# Patient Record
Sex: FEMALE | Race: WHITE | NOT HISPANIC OR LATINO | Employment: FULL TIME | URBAN - METROPOLITAN AREA
[De-identification: names, ages, dates, MRNs, and addresses within clinical notes are randomized per-mention and may not be internally consistent; named-entity substitution may affect disease eponyms.]

---

## 2017-02-15 ENCOUNTER — OFFICE VISIT (OUTPATIENT)
Dept: FAMILY MEDICINE CLINIC | Facility: CLINIC | Age: 57
End: 2017-02-15

## 2017-02-15 VITALS
OXYGEN SATURATION: 97 % | HEIGHT: 63 IN | BODY MASS INDEX: 31.54 KG/M2 | DIASTOLIC BLOOD PRESSURE: 78 MMHG | TEMPERATURE: 97.9 F | SYSTOLIC BLOOD PRESSURE: 126 MMHG | RESPIRATION RATE: 16 BRPM | HEART RATE: 97 BPM | WEIGHT: 178 LBS

## 2017-02-15 DIAGNOSIS — R21 RASH: Primary | ICD-10-CM

## 2017-02-15 PROCEDURE — 99213 OFFICE O/P EST LOW 20 MIN: CPT | Performed by: FAMILY MEDICINE

## 2017-02-15 RX ORDER — FLUCONAZOLE 150 MG/1
TABLET ORAL
COMMUNITY
Start: 2017-01-18 | End: 2020-01-03

## 2017-02-15 RX ORDER — CHLORHEXIDINE GLUCONATE 0.12 MG/ML
RINSE ORAL
COMMUNITY
Start: 2017-01-24 | End: 2019-01-04

## 2017-02-15 RX ORDER — ACYCLOVIR 50 MG/G
CREAM TOPICAL
COMMUNITY
Start: 2017-02-12 | End: 2018-03-23

## 2017-02-15 RX ORDER — VALACYCLOVIR HYDROCHLORIDE 1 G/1
1000 TABLET, FILM COATED ORAL 3 TIMES DAILY
Qty: 21 TABLET | Refills: 0 | Status: SHIPPED | OUTPATIENT
Start: 2017-02-15 | End: 2017-02-22

## 2017-02-15 RX ORDER — LIFITEGRAST 50 MG/ML
SOLUTION/ DROPS OPHTHALMIC
COMMUNITY
Start: 2017-01-24

## 2017-02-15 RX ORDER — FLUOXETINE HYDROCHLORIDE 20 MG/1
CAPSULE ORAL
COMMUNITY
Start: 2017-01-31 | End: 2020-07-07

## 2017-02-15 RX ORDER — ESTRADIOL 0.75 MG/1.25G
GEL, METERED TOPICAL
COMMUNITY
Start: 2017-01-23 | End: 2018-03-23

## 2017-02-15 NOTE — PROGRESS NOTES
Subjective   Arlene Quesada is a 56 y.o. female.     History of Present Illness   Chief Complaint:   Chief Complaint   Patient presents with   • Rash     left thigh       Arlene Quesada 56 y.o. female who presents today for a rash on her upper left thigh that has been present for a week. She stated tit is not painful but does itch. She is concerned it is spreading. She has been taking Valtrex. She already takes one gram a day for Herpes Simplex 1..  she has a history of   Patient Active Problem List   Diagnosis   • Depression   • Gastroesophageal reflux   • Glaucoma   • Hyperlipemia   • Hypertension, essential, benign   • Low back pain   • Nausea   .  Since the last visit, she has overall felt well.  she has been compliant with   Current Outpatient Prescriptions:   •  amLODIPine-benazepril (LOTREL 5-10) 5-10 MG per capsule, Take 1 capsule by mouth daily. For BP, Disp: 30 capsule, Rfl: 5  •  bimatoprost (LUMIGAN) 0.01 % ophthalmic drops, 1 drop nightly. Instill 1 in affected eye once a day (at bedtime), Disp: , Rfl:   •  Calcium Carbonate-Vit D-Min (CALCIUM 1200) 1880-2561 MG-UNIT chewable tablet, Chew., Disp: , Rfl:   •  chlorhexidine (PERIDEX) 0.12 % solution, , Disp: , Rfl:   •  Cholecalciferol (VITAMIN D3) 2000 UNITS tablet, Take by mouth. Take 1 capsule by oral route daily for 90 days, Disp: , Rfl:   •  desvenlafaxine (PRISTIQ) 100 MG 24 hr tablet, Take 1 tablet by mouth daily., Disp: 30 tablet, Rfl: 5  •  esomeprazole (NEXIUM) 20 MG capsule, Take 20 mg by mouth. Take 1 capsule (20 mg) by oral route once daily at least 1 hour before a meal for 4 weeks swallowing whole.  Do not crush or chew granules., Disp: , Rfl:   •  ESTROGEL 0.75 MG/1.25 GM (0.06%) topical gel, , Disp: , Rfl:   •  fluconazole (DIFLUCAN) 150 MG tablet, , Disp: , Rfl:   •  FLUoxetine (PROzac) 20 MG capsule, , Disp: , Rfl:   •  fluticasone (FLONASE) 50 MCG/ACT nasal spray, 2 sprays into each nostril daily. Administer 2 sprays in each nostril for  "each dose., Disp: 1 each, Rfl: 11  •  valACYclovir (VALTREX) 1000 MG tablet, Take 1 tablet by mouth daily. Take 1 tablet by mouth once a day, Disp: 30 tablet, Rfl: 11  •  XIIDRA 5 % solution, , Disp: , Rfl:   •  ZOVIRAX 5 % cream, , Disp: , Rfl:   •  benzonatate (TESSALON) 200 MG capsule, Take 1 capsule by mouth 3 (Three) Times a Day As Needed for cough., Disp: 30 capsule, Rfl: 0  •  levoFLOXacin (LEVAQUIN) 500 MG tablet, Take 1 tablet by mouth Daily. One PO daily for infection, Disp: 14 tablet, Rfl: 1  •  MethylPREDNISolone (MEDROL, CHULA,) 4 MG tablet, follow package directions, Disp: 21 tablet, Rfl: 0  •  terconazole (TERAZOL 3) 0.8 % vaginal cream, Insert 1 applicator into the vagina Every Night. (put on file), Disp: 20 each, Rfl: 5.  she denies medication side effects.    All of the chronic condition(s) listed above are stable w/o issues.    Visit Vitals   • /78   • Pulse 97   • Temp 97.9 °F (36.6 °C) (Oral)   • Resp 16   • Ht 63\" (160 cm)   • Wt 178 lb (80.7 kg)   • SpO2 97%   • BMI 31.53 kg/m2       The following portions of the patient's history were reviewed and updated as appropriate: allergies, current medications, past family history, past medical history, past social history, past surgical history and problem list.    Review of Systems   Constitutional: Negative for activity change, appetite change and unexpected weight change.   Eyes: Negative for visual disturbance.   Respiratory: Negative for chest tightness and shortness of breath.    Cardiovascular: Negative for chest pain and palpitations.   Skin: Positive for rash.   Neurological: Negative for syncope and speech difficulty.   Psychiatric/Behavioral: Negative for confusion and decreased concentration.       Objective   Physical Exam   Constitutional: She appears well-developed and well-nourished.   Skin:   ? Herpes zoster type rash left anterior thigh    4 days old   Taking valtrex for this  Continue same     4 lesions  Left anterior thigh "   Nursing note and vitals reviewed.      Assessment/Plan   Arlene was seen today for rash.    Diagnoses and all orders for this visit:    Rash  Comments:  shingles  left thigh    Other orders  -     valACYclovir (VALTREX) 1000 MG tablet; Take 1 tablet by mouth 3 (Three) Times a Day for 7 days.

## 2017-02-15 NOTE — PATIENT INSTRUCTIONS
Exercise 30 minutes most days of the week  Sleep 6-8 hours each night if possible  Low fat, low cholesterol diet   we discussed prescribed medications and how to take them   make sure you get results of any labs/studies ordered today  Low glycemic index diet  Zyrtec 1 po q day

## 2017-03-02 ENCOUNTER — RESULTS ENCOUNTER (OUTPATIENT)
Dept: FAMILY MEDICINE CLINIC | Facility: CLINIC | Age: 57
End: 2017-03-02

## 2017-03-02 DIAGNOSIS — E78.5 HYPERLIPEMIA: ICD-10-CM

## 2017-03-02 DIAGNOSIS — I10 HYPERTENSION, ESSENTIAL, BENIGN: ICD-10-CM

## 2017-03-02 DIAGNOSIS — F32.A DEPRESSION: ICD-10-CM

## 2017-04-25 RX ORDER — AMLODIPINE BESYLATE AND BENAZEPRIL HYDROCHLORIDE 5; 10 MG/1; MG/1
CAPSULE ORAL
Qty: 30 CAPSULE | Refills: 0 | Status: SHIPPED | OUTPATIENT
Start: 2017-04-25 | End: 2017-05-22 | Stop reason: SDUPTHER

## 2017-05-22 RX ORDER — AMLODIPINE BESYLATE AND BENAZEPRIL HYDROCHLORIDE 5; 10 MG/1; MG/1
CAPSULE ORAL
Qty: 30 CAPSULE | Refills: 0 | Status: SHIPPED | OUTPATIENT
Start: 2017-05-22 | End: 2017-07-05 | Stop reason: SDUPTHER

## 2017-07-05 RX ORDER — AMLODIPINE BESYLATE AND BENAZEPRIL HYDROCHLORIDE 5; 10 MG/1; MG/1
CAPSULE ORAL
Qty: 30 CAPSULE | Refills: 0 | Status: SHIPPED | OUTPATIENT
Start: 2017-07-05 | End: 2017-07-11 | Stop reason: SDUPTHER

## 2017-07-06 RX ORDER — VALACYCLOVIR HYDROCHLORIDE 1 G/1
TABLET, FILM COATED ORAL
Qty: 30 TABLET | Refills: 10 | Status: SHIPPED | OUTPATIENT
Start: 2017-07-06 | End: 2017-07-11 | Stop reason: SDUPTHER

## 2017-07-11 ENCOUNTER — OFFICE VISIT (OUTPATIENT)
Dept: FAMILY MEDICINE CLINIC | Facility: CLINIC | Age: 57
End: 2017-07-11

## 2017-07-11 VITALS
WEIGHT: 177 LBS | RESPIRATION RATE: 16 BRPM | HEIGHT: 63 IN | TEMPERATURE: 98.5 F | SYSTOLIC BLOOD PRESSURE: 110 MMHG | DIASTOLIC BLOOD PRESSURE: 70 MMHG | BODY MASS INDEX: 31.36 KG/M2 | HEART RATE: 92 BPM | OXYGEN SATURATION: 97 %

## 2017-07-11 DIAGNOSIS — B00.9 RECURRENT HSV (HERPES SIMPLEX VIRUS): ICD-10-CM

## 2017-07-11 DIAGNOSIS — J30.2 SEASONAL ALLERGIC RHINITIS, UNSPECIFIED ALLERGIC RHINITIS TRIGGER: ICD-10-CM

## 2017-07-11 DIAGNOSIS — I10 HYPERTENSION, ESSENTIAL, BENIGN: Primary | ICD-10-CM

## 2017-07-11 DIAGNOSIS — E78.2 MIXED HYPERLIPIDEMIA: ICD-10-CM

## 2017-07-11 DIAGNOSIS — K21.9 GASTROESOPHAGEAL REFLUX DISEASE WITHOUT ESOPHAGITIS: ICD-10-CM

## 2017-07-11 PROCEDURE — 99213 OFFICE O/P EST LOW 20 MIN: CPT | Performed by: PHYSICIAN ASSISTANT

## 2017-07-11 RX ORDER — AMLODIPINE BESYLATE AND BENAZEPRIL HYDROCHLORIDE 5; 10 MG/1; MG/1
1 CAPSULE ORAL DAILY
Qty: 30 CAPSULE | Refills: 5 | Status: SHIPPED | OUTPATIENT
Start: 2017-07-11 | End: 2018-01-17 | Stop reason: SDUPTHER

## 2017-07-11 RX ORDER — FLUTICASONE PROPIONATE 50 MCG
2 SPRAY, SUSPENSION (ML) NASAL DAILY
Qty: 1 EACH | Refills: 11 | Status: SHIPPED | OUTPATIENT
Start: 2017-07-11

## 2017-07-11 RX ORDER — VALACYCLOVIR HYDROCHLORIDE 1 G/1
1000 TABLET, FILM COATED ORAL DAILY
Qty: 30 TABLET | Refills: 11 | Status: SHIPPED | OUTPATIENT
Start: 2017-07-11 | End: 2018-08-22 | Stop reason: SDUPTHER

## 2017-07-11 NOTE — PATIENT INSTRUCTIONS
Can try Zantac 150mg twice daily in place of Nexium to see if controls heartburn  I will put Nexium on file  See ortho

## 2017-07-11 NOTE — PROGRESS NOTES
Subjective   Arlene Quesada is a 57 y.o. female.     History of Present Illness   Arlene Quesada 57 y.o. female who presents today for routine follow up check and medication refills.  she has a history of   Patient Active Problem List   Diagnosis   • Depression   • Gastroesophageal reflux   • Glaucoma   • Hyperlipemia   • Hypertension, essential, benign   • Low back pain   • Nausea   • Recurrent HSV (herpes simplex virus)   .  Since the last visit, she has overall felt tired.  She has Hypertenision and is well controlled on medication and GERD and is well controlled on PPI medication.  she has been compliant with current medications have reviewed them.  The patient denies medication side effects.      On allergy meds and working well  I must update all labs  She is on allergy meds and helps  Seeing psych for depression    I will update all labs  Knee pain Right and will refer ortho  On Valtrex for HSV suppression    The following portions of the patient's history were reviewed and updated as appropriate: allergies, current medications, past family history, past medical history, past social history, past surgical history and problem list.    Review of Systems   Constitutional: Negative for activity change, appetite change and unexpected weight change.   HENT: Negative for nosebleeds and trouble swallowing.    Eyes: Negative for pain and visual disturbance.   Respiratory: Negative for chest tightness, shortness of breath and wheezing.    Cardiovascular: Negative for chest pain and palpitations.   Gastrointestinal: Negative for abdominal pain and blood in stool.   Endocrine: Negative.    Genitourinary: Negative for difficulty urinating and hematuria.   Musculoskeletal: Positive for arthralgias. Negative for joint swelling.   Skin: Negative for color change and rash.   Allergic/Immunologic: Negative.    Neurological: Negative for syncope and speech difficulty.   Hematological: Negative for adenopathy.    Psychiatric/Behavioral: Positive for dysphoric mood. Negative for agitation and confusion.   All other systems reviewed and are negative.      Objective   Physical Exam   Constitutional: She is oriented to person, place, and time. She appears well-developed and well-nourished. No distress.   HENT:   Head: Normocephalic and atraumatic.   Eyes: Conjunctivae and EOM are normal. Pupils are equal, round, and reactive to light. Right eye exhibits no discharge. Left eye exhibits no discharge. No scleral icterus.   Neck: Normal range of motion. Neck supple. No tracheal deviation present. No thyromegaly present.   Cardiovascular: Normal rate, regular rhythm, normal heart sounds, intact distal pulses and normal pulses.  Exam reveals no gallop.    No murmur heard.  Pulmonary/Chest: Effort normal and breath sounds normal. No respiratory distress. She has no wheezes. She has no rales.   Musculoskeletal: Normal range of motion.   Neurological: She is alert and oriented to person, place, and time. She exhibits normal muscle tone. Coordination normal.   Skin: Skin is warm. No rash noted. No erythema. No pallor.   Psychiatric: She has a normal mood and affect. Her behavior is normal. Judgment and thought content normal.   Nursing note and vitals reviewed.      Assessment/Plan   Arlene was seen today for hypertension and knee pain.    Diagnoses and all orders for this visit:    Hypertension, essential, benign  -     Comprehensive metabolic panel  -     Lipid panel  -     CBC and Differential  -     TSH  -     T4, Free  -     Vitamin D 25 Hydroxy    Gastroesophageal reflux disease without esophagitis  -     Comprehensive metabolic panel  -     Lipid panel  -     CBC and Differential  -     TSH  -     T4, Free  -     Vitamin D 25 Hydroxy    Mixed hyperlipidemia  -     Comprehensive metabolic panel  -     Lipid panel  -     CBC and Differential  -     TSH  -     T4, Free  -     Vitamin D 25 Hydroxy    Seasonal allergic rhinitis,  unspecified allergic rhinitis trigger  -     Comprehensive metabolic panel  -     Lipid panel  -     CBC and Differential  -     TSH  -     T4, Free  -     Vitamin D 25 Hydroxy    Recurrent HSV (herpes simplex virus)  -     Comprehensive metabolic panel  -     Lipid panel  -     CBC and Differential  -     TSH  -     T4, Free  -     Vitamin D 25 Hydroxy    Other orders  -     valACYclovir (VALTREX) 1000 MG tablet; Take 1 tablet by mouth Daily.  -     terconazole (TERAZOL 3) 0.8 % vaginal cream; Insert 1 applicator into the vagina Every Night. (put on file)  -     fluticasone (FLONASE) 50 MCG/ACT nasal spray; 2 sprays into each nostril Daily. Administer 2 sprays in each nostril for each dose. For allergies  -     amLODIPine-benazepril (LOTREL 5-10) 5-10 MG per capsule; Take 1 capsule by mouth Daily. For BP

## 2018-01-17 ENCOUNTER — OFFICE VISIT (OUTPATIENT)
Dept: FAMILY MEDICINE CLINIC | Facility: CLINIC | Age: 58
End: 2018-01-17

## 2018-01-17 VITALS
WEIGHT: 177 LBS | TEMPERATURE: 98.6 F | SYSTOLIC BLOOD PRESSURE: 120 MMHG | BODY MASS INDEX: 31.36 KG/M2 | DIASTOLIC BLOOD PRESSURE: 70 MMHG | HEART RATE: 88 BPM | RESPIRATION RATE: 16 BRPM | OXYGEN SATURATION: 99 % | HEIGHT: 63 IN

## 2018-01-17 DIAGNOSIS — E78.2 MIXED HYPERLIPIDEMIA: ICD-10-CM

## 2018-01-17 DIAGNOSIS — I10 HYPERTENSION, ESSENTIAL, BENIGN: ICD-10-CM

## 2018-01-17 DIAGNOSIS — J06.9 ACUTE URI: Primary | ICD-10-CM

## 2018-01-17 DIAGNOSIS — Z00.00 LABORATORY EXAMINATION ORDERED AS PART OF A ROUTINE GENERAL MEDICAL EXAMINATION: ICD-10-CM

## 2018-01-17 PROCEDURE — 99214 OFFICE O/P EST MOD 30 MIN: CPT | Performed by: PHYSICIAN ASSISTANT

## 2018-01-17 RX ORDER — AMLODIPINE BESYLATE AND BENAZEPRIL HYDROCHLORIDE 5; 10 MG/1; MG/1
1 CAPSULE ORAL DAILY
Qty: 30 CAPSULE | Refills: 5 | Status: SHIPPED | OUTPATIENT
Start: 2018-01-17 | End: 2018-10-02 | Stop reason: SDUPTHER

## 2018-01-17 RX ORDER — FLUCONAZOLE 150 MG/1
150 TABLET ORAL ONCE
Qty: 1 TABLET | Refills: 2 | Status: SHIPPED | OUTPATIENT
Start: 2018-01-17 | End: 2018-01-17

## 2018-01-17 RX ORDER — LEVOFLOXACIN 500 MG/1
500 TABLET, FILM COATED ORAL DAILY
Qty: 10 TABLET | Refills: 1 | Status: SHIPPED | OUTPATIENT
Start: 2018-01-17 | End: 2018-03-23

## 2018-01-17 NOTE — PATIENT INSTRUCTIONS
Low glycemic index diet  Exercise 30 minutes most days of the week  Make sure you get results on any labs or tests we ordered today  We discussed medications and how to take them as prescribed  Sleep 6-8 hours each night if possible  If you have not signed up for [employer], please activate your code ASAP from your After Visit Summary today    LDL goal <100  LDL goal if heart disease <70  HDL goal >60  Triglyceride goal <150  BP goal =<130/80  Fasting glucose <100    Labs  For throat pain:  Can gargle with 1/2 Maalox and 1/2 Benadryl liquid ---mix, gargle and spit Take Tylenol for fever or aches  Rest as needed  Call not better in 7 days  Increase fluids  Call if worse  Can use generic Afrin nasal spray up to 5 days for nasal congestion  Finish antibiotic course of therapy

## 2018-02-16 LAB
25(OH)D3+25(OH)D2 SERPL-MCNC: 49.7 NG/ML (ref 30–100)
ALBUMIN SERPL-MCNC: 4.3 G/DL (ref 3.5–5.2)
ALBUMIN/GLOB SERPL: 1.3 G/DL
ALP SERPL-CCNC: 90 U/L (ref 39–117)
ALT SERPL-CCNC: 22 U/L (ref 1–33)
AST SERPL-CCNC: 21 U/L (ref 1–32)
BASOPHILS # BLD AUTO: 0.03 10*3/MM3 (ref 0–0.2)
BASOPHILS NFR BLD AUTO: 0.4 % (ref 0–1.5)
BILIRUB SERPL-MCNC: 0.2 MG/DL (ref 0.1–1.2)
BUN SERPL-MCNC: 11 MG/DL (ref 6–20)
BUN/CREAT SERPL: 12.8 (ref 7–25)
CALCIUM SERPL-MCNC: 10 MG/DL (ref 8.6–10.5)
CHLORIDE SERPL-SCNC: 103 MMOL/L (ref 98–107)
CHOLEST SERPL-MCNC: 190 MG/DL (ref 0–200)
CO2 SERPL-SCNC: 27.2 MMOL/L (ref 22–29)
CREAT SERPL-MCNC: 0.86 MG/DL (ref 0.57–1)
EOSINOPHIL # BLD AUTO: 0.23 10*3/MM3 (ref 0–0.7)
EOSINOPHIL NFR BLD AUTO: 2.9 % (ref 0.3–6.2)
ERYTHROCYTE [DISTWIDTH] IN BLOOD BY AUTOMATED COUNT: 14.6 % (ref 11.7–13)
GFR SERPLBLD CREATININE-BSD FMLA CKD-EPI: 68 ML/MIN/1.73
GFR SERPLBLD CREATININE-BSD FMLA CKD-EPI: 83 ML/MIN/1.73
GLOBULIN SER CALC-MCNC: 3.2 GM/DL
GLUCOSE SERPL-MCNC: 104 MG/DL (ref 65–99)
HCT VFR BLD AUTO: 39.5 % (ref 35.6–45.5)
HDLC SERPL-MCNC: 43 MG/DL (ref 40–60)
HGB BLD-MCNC: 12.5 G/DL (ref 11.9–15.5)
IMM GRANULOCYTES # BLD: 0 10*3/MM3 (ref 0–0.03)
IMM GRANULOCYTES NFR BLD: 0 % (ref 0–0.5)
LDLC SERPL CALC-MCNC: 109 MG/DL (ref 0–100)
LYMPHOCYTES # BLD AUTO: 2.74 10*3/MM3 (ref 0.9–4.8)
LYMPHOCYTES NFR BLD AUTO: 35 % (ref 19.6–45.3)
MCH RBC QN AUTO: 29.8 PG (ref 26.9–32)
MCHC RBC AUTO-ENTMCNC: 31.6 G/DL (ref 32.4–36.3)
MCV RBC AUTO: 94.3 FL (ref 80.5–98.2)
MONOCYTES # BLD AUTO: 0.52 10*3/MM3 (ref 0.2–1.2)
MONOCYTES NFR BLD AUTO: 6.6 % (ref 5–12)
NEUTROPHILS # BLD AUTO: 4.31 10*3/MM3 (ref 1.9–8.1)
NEUTROPHILS NFR BLD AUTO: 55.1 % (ref 42.7–76)
PLATELET # BLD AUTO: 490 10*3/MM3 (ref 140–500)
POTASSIUM SERPL-SCNC: 5.2 MMOL/L (ref 3.5–5.2)
PROT SERPL-MCNC: 7.5 G/DL (ref 6–8.5)
RBC # BLD AUTO: 4.19 10*6/MM3 (ref 3.9–5.2)
SODIUM SERPL-SCNC: 143 MMOL/L (ref 136–145)
T4 FREE SERPL-MCNC: 1.01 NG/DL (ref 0.93–1.7)
TRIGL SERPL-MCNC: 191 MG/DL (ref 0–150)
TSH SERPL DL<=0.005 MIU/L-ACNC: 1.78 MIU/ML (ref 0.27–4.2)
VLDLC SERPL CALC-MCNC: 38.2 MG/DL (ref 5–40)
WBC # BLD AUTO: 7.83 10*3/MM3 (ref 4.5–10.7)

## 2018-02-19 ENCOUNTER — TRANSCRIBE ORDERS (OUTPATIENT)
Dept: ADMINISTRATIVE | Facility: HOSPITAL | Age: 58
End: 2018-02-19

## 2018-02-19 DIAGNOSIS — M54.2 CERVICAL PAIN: Primary | ICD-10-CM

## 2018-02-19 LAB
HBA1C MFR BLD: 5.6 % (ref 4.8–5.6)
Lab: NORMAL
WRITTEN AUTHORIZATION: NORMAL

## 2018-02-23 ENCOUNTER — HOSPITAL ENCOUNTER (OUTPATIENT)
Dept: MRI IMAGING | Facility: HOSPITAL | Age: 58
Discharge: HOME | End: 2018-02-23
Attending: SPECIALIST | Admitting: SPECIALIST

## 2018-02-23 DIAGNOSIS — M54.2 CERVICAL PAIN: ICD-10-CM

## 2018-02-23 PROCEDURE — 72141 MRI NECK SPINE W/O DYE: CPT

## 2018-03-23 ENCOUNTER — OFFICE VISIT (OUTPATIENT)
Dept: PAIN MEDICINE | Facility: CLINIC | Age: 58
End: 2018-03-23

## 2018-03-23 VITALS
HEART RATE: 93 BPM | SYSTOLIC BLOOD PRESSURE: 128 MMHG | DIASTOLIC BLOOD PRESSURE: 74 MMHG | RESPIRATION RATE: 18 BRPM | OXYGEN SATURATION: 96 % | TEMPERATURE: 98.7 F | HEIGHT: 63 IN

## 2018-03-23 DIAGNOSIS — M54.2 NECK PAIN: Primary | ICD-10-CM

## 2018-03-23 DIAGNOSIS — M48.02 CERVICAL SPINAL STENOSIS: ICD-10-CM

## 2018-03-23 LAB
POC AMPHETAMINES: NEGATIVE
POC BARBITURATES: NEGATIVE
POC BENZODIAZEPHINES: POSITIVE
POC COCAINE: NEGATIVE
POC METHADONE: NEGATIVE
POC METHAMPHETAMINE SCREEN URINE: NEGATIVE
POC OPIATES: NEGATIVE
POC OXYCODONE: NEGATIVE
POC PHENCYCLIDINE: NEGATIVE
POC PROPOXYPHENE: NEGATIVE
POC THC: NEGATIVE
POC TRICYCLIC ANTIDEPRESSANTS: POSITIVE

## 2018-03-23 PROCEDURE — 99204 OFFICE O/P NEW MOD 45 MIN: CPT | Performed by: PAIN MEDICINE

## 2018-03-23 PROCEDURE — 80305 DRUG TEST PRSMV DIR OPT OBS: CPT | Performed by: PAIN MEDICINE

## 2018-03-23 RX ORDER — LIDOCAINE 50 MG/G
2 PATCH TOPICAL EVERY 24 HOURS
Qty: 30 PATCH | Refills: 3 | Status: SHIPPED | OUTPATIENT
Start: 2018-03-23

## 2018-03-23 RX ORDER — LORAZEPAM 0.5 MG/1
TABLET ORAL
COMMUNITY
Start: 2018-03-06

## 2018-03-23 RX ORDER — NAPROXEN AND ESOMEPRAZOLE MAGNESIUM 500; 20 MG/1; MG/1
TABLET, DELAYED RELEASE ORAL
Refills: 1 | COMMUNITY
Start: 2018-02-19 | End: 2018-07-24 | Stop reason: SDUPTHER

## 2018-03-23 NOTE — PROGRESS NOTES
CHIEF COMPLAINT: Neck Pain    Arlene Quesada is a 57 y.o. female.   He was referred here by Sofie Salter MD. He presents to the office for evaluation and treatment of Neck Pain    HPI  Neck Pain  Started around 2008 or 2009 and has progressively gotten worse. Ms. Quesada states that she has seen orthopedic surgeon Dr. Sofie Salter for neck pain. Recently started on vimovo with good results. Neck pain is currenlty well controlled. History of bilateral carpal tunnel, moderate on right, mild of left. Every 5 months receives bilateral hand injections. When due to injections, has bilateral hand numbness/tingling- currently resolved at this time.     The patient states their pain is a 2 on a scale of 1-10.  The patient describes this pain as episodic sharp, stabbing and burning.  The pain is located in bilateral neck and does radiate posterior head. This painful problem is aggravated by physical activity, work and turning head and is alleviated by pain medication and massage.    Past pain medications: ibuprofen    Current pain medications:   vimovo - helping    Past therapies:  Physical Therapy: yes(helped some)  Chiropractor: yes(didn't help)  Massage Therapy: yes(helped some)  TENS: yes  Neck or back surgery: no  Past pain management: yes([facility] for low back pain in early 2000's)    Previous Injections: low back injections and bilateral hip injections  Effect of Injection (%): didn't help much  Length of Relief:      PEG Assessment   What number best describes your pain on average in the past week? 3  What number best describes how, during the past week, pain has interfered with your enjoyment of life? 1  What number best describes how, during the past week, pain has interfered with your general activity? 1      Current Outpatient Prescriptions:   •  amLODIPine-benazepril (LOTREL 5-10) 5-10 MG per capsule, Take 1 capsule by mouth Daily. For BP, Disp: 30 capsule, Rfl: 5  •  bimatoprost (LUMIGAN) 0.01 %  ophthalmic drops, 1 drop nightly. Instill 1 in affected eye once a day (at bedtime), Disp: , Rfl:   •  Calcium Carbonate-Vit D-Min (CALCIUM 1200) 1851-1284 MG-UNIT chewable tablet, Chew., Disp: , Rfl:   •  chlorhexidine (PERIDEX) 0.12 % solution, , Disp: , Rfl:   •  Cholecalciferol (VITAMIN D3) 2000 UNITS tablet, Take by mouth. Take 1 capsule by oral route daily for 90 days, Disp: , Rfl:   •  desvenlafaxine (PRISTIQ) 100 MG 24 hr tablet, Take 1 tablet by mouth daily., Disp: 30 tablet, Rfl: 5  •  esomeprazole (NEXIUM) 20 MG capsule, Take 20 mg by mouth. Take 1 capsule (20 mg) by oral route once daily at least 1 hour before a meal for 4 weeks swallowing whole.  Do not crush or chew granules., Disp: , Rfl:   •  FLUoxetine (PROzac) 20 MG capsule, , Disp: , Rfl:   •  fluticasone (FLONASE) 50 MCG/ACT nasal spray, 2 sprays into each nostril Daily. Administer 2 sprays in each nostril for each dose. For allergies, Disp: 1 each, Rfl: 11  •  mupirocin (BACTROBAN) 2 % ointment, Apply  topically 3 (Three) Times a Day. To lesion until healed, Disp: 30 g, Rfl: 0  •  terconazole (TERAZOL 3) 0.8 % vaginal cream, Insert 1 applicator into the vagina Every Night. (put on file), Disp: 20 each, Rfl: 5  •  valACYclovir (VALTREX) 1000 MG tablet, Take 1 tablet by mouth Daily., Disp: 30 tablet, Rfl: 11  •  XIIDRA 5 % solution, , Disp: , Rfl:   •  diclofenac (FLECTOR) 1.3 % patch patch, Apply 1 patch topically 2 (Two) Times a Day., Disp: 60 patch, Rfl: 3  •  fluconazole (DIFLUCAN) 150 MG tablet, , Disp: , Rfl:   •  lidocaine (LIDODERM) 5 %, Place 2 patches on the skin Daily. Remove & Discard patch within 12 hours or as directed by MD, Disp: 30 patch, Rfl: 3  •  LORazepam (ATIVAN) 0.5 MG tablet, , Disp: , Rfl:   •  VIMOVO 500-20 MG tablet delayed-release, , Disp: , Rfl: 1    REVIEW OF PERTINENT MEDICAL DATA  Chart reviewed and summarization of all medical records up to new patient visit performed.  Both imaging from 2011 and 2018 reviewed. Up  "to date on wellness exams. Not on narcotics.     IMAGING  Cervical mri - 2/2018    Patient had disc with her today    PFSH:  The following portions of the patient's history were reviewed and updated as appropriate: problem list, past medical history, past surgery history, social history, family history, medications, and allergies    Review of Systems   Constitutional: Negative for fatigue.   HENT: Negative for congestion.    Eyes: Negative for visual disturbance.   Respiratory: Negative for cough, shortness of breath and wheezing.    Cardiovascular: Negative.    Gastrointestinal: Negative for constipation and diarrhea.   Genitourinary: Negative for difficulty urinating.   Musculoskeletal: Positive for neck pain and neck stiffness.   Skin: Negative for rash and wound.   Allergic/Immunologic: Negative for immunocompromised state.   Neurological: Positive for weakness and numbness (fingers on both hands).   Hematological: Does not bruise/bleed easily.   Psychiatric/Behavioral: Positive for sleep disturbance. Negative for suicidal ideas. The patient is nervous/anxious.    All other systems reviewed and are negative.      Vitals:    03/23/18 1504   BP: 128/74   Pulse: 93   Resp: 18   Temp: 98.7 °F (37.1 °C)   SpO2: 96%   Height: 160 cm (63\")   PainSc:   2   PainLoc: Neck       Physical Exam   Constitutional: She appears well-developed and well-nourished. No distress.   HENT:   Head: Normocephalic and atraumatic.   Nose: Nose normal.   Mouth/Throat: Oropharynx is clear and moist.   Eyes: Conjunctivae and EOM are normal.   Neck: Normal range of motion. Neck supple.   Cardiovascular: Normal rate, regular rhythm and normal heart sounds.    Pulmonary/Chest: Effort normal and breath sounds normal. No stridor. No respiratory distress.   Abdominal: Soft. Bowel sounds are normal. She exhibits no distension. There is no tenderness.   Neurological: She is alert. She has normal strength. No cranial nerve deficit or sensory deficit. "   Skin: Skin is warm and dry. No rash noted. She is not diaphoretic.   Psychiatric: She has a normal mood and affect. Her speech is normal and behavior is normal.   Nursing note and vitals reviewed.    Ortho Exam  Neurologic Exam     Mental Status   Speech: speech is normal     Cranial Nerves     CN III, IV, VI   Extraocular motions are normal.     Motor Exam     Strength   Strength 5/5 throughout.       No results found for: POCMETH, POCAMPHET, POCBARBITUR, POCBENZO, POCCOCAINE, POCMETHADO, POCOPIATES, POCOXYCODO, POCPHENCYC, POCPROPOXY, POCTHC, POCTRICYC    Comments: Reviewed POC today      Date of last ZIYAD reviewed : 03/23/18   Comments: Godwin Jacobs was seen today for neck pain.    Diagnoses and all orders for this visit:    Neck pain  -     Case Request  -     lidocaine (LIDODERM) 5 %; Place 2 patches on the skin Daily. Remove & Discard patch within 12 hours or as directed by MD  -     diclofenac (FLECTOR) 1.3 % patch patch; Apply 1 patch topically 2 (Two) Times a Day.    Cervical spinal stenosis  -     Case Request  -     lidocaine (LIDODERM) 5 %; Place 2 patches on the skin Daily. Remove & Discard patch within 12 hours or as directed by MD  -     diclofenac (FLECTOR) 1.3 % patch patch; Apply 1 patch topically 2 (Two) Times a Day.      Requested Prescriptions     Signed Prescriptions Disp Refills   • lidocaine (LIDODERM) 5 % 30 patch 3     Sig: Place 2 patches on the skin Daily. Remove & Discard patch within 12 hours or as directed by MD   • diclofenac (FLECTOR) 1.3 % patch patch 60 patch 3     Sig: Apply 1 patch topically 2 (Two) Times a Day.     - Imaging reviewed with patient.  Has both cervical stenosis and facet disease.   - Discussed with the patient regarding the etiology of their pain. Informed them that they would likely benefit from a C5/C6 cervical epidural steroid injection.  The procedure was described in detail and the risks, benefits and alternatives were discussed with the  patient (including but not limited to: bleeding, infection, nerve damage, worsening of pain, inability to perform injection, paralysis, seizures, and death) who agreed to proceed.   - will perform one injection then reassess. She is unsure if she wants to precede with the injection now that she is feeling better with vomovo. She has been on for several weeks. Unsure if the pain will return when she stops. She will stop medication and see if her pain worsens.   - also has facet disease seen on imaging and has facet loading on exam. Would likely benefit from bilateral facet injections. Will consider after evaluating her benefit from LEONA.    - try both patches lidoderm and diclofenac. Going to try temporarily to see if she can get any relief. Will stop vimovo if staying on diclofenac patch.     Wt Readings from Last 3 Encounters:   01/17/18 80.3 kg (177 lb)   07/11/17 80.3 kg (177 lb)   02/15/17 80.7 kg (178 lb)     There is no height or weight on file to calculate BMI. Patient counseled on the importance of weight loss to help with overall health and pain control. Patient instructed to attempt weight loss.   Plan: Calorie counting reduce portion size and cut out extra servings    Follow-up as needed for pain        Lorraine Zamora MD  Pain Management

## 2018-03-26 ENCOUNTER — PRIOR AUTHORIZATION (OUTPATIENT)
Dept: PAIN MEDICINE | Facility: CLINIC | Age: 58
End: 2018-03-26

## 2018-03-26 NOTE — TELEPHONE ENCOUNTER
Sent PA for Flector Patch to [facility] through [employer] (Key # [id number]) and waiting for resposne

## 2018-03-26 NOTE — TELEPHONE ENCOUNTER
Angy @ [facility] called and states that the Flector Patch has been denied and that she will send us a fax with the reasoning's

## 2018-03-27 ENCOUNTER — TELEPHONE (OUTPATIENT)
Dept: PAIN MEDICINE | Facility: CLINIC | Age: 58
End: 2018-03-27

## 2018-03-28 ENCOUNTER — RESULTS ENCOUNTER (OUTPATIENT)
Dept: PAIN MEDICINE | Facility: CLINIC | Age: 58
End: 2018-03-28

## 2018-03-28 DIAGNOSIS — M54.2 NECK PAIN: ICD-10-CM

## 2018-03-28 DIAGNOSIS — M48.02 CERVICAL SPINAL STENOSIS: ICD-10-CM

## 2018-04-17 ENCOUNTER — TELEPHONE (OUTPATIENT)
Dept: PAIN MEDICINE | Facility: CLINIC | Age: 58
End: 2018-04-17

## 2018-04-18 RX ORDER — NITROFURANTOIN 25; 75 MG/1; MG/1
CAPSULE ORAL
Qty: 14 CAPSULE | Refills: 2 | Status: SHIPPED | OUTPATIENT
Start: 2018-04-18 | End: 2018-04-27

## 2018-04-27 ENCOUNTER — OFFICE VISIT (OUTPATIENT)
Dept: FAMILY MEDICINE CLINIC | Facility: CLINIC | Age: 58
End: 2018-04-27

## 2018-04-27 VITALS
HEIGHT: 63 IN | DIASTOLIC BLOOD PRESSURE: 72 MMHG | TEMPERATURE: 98.1 F | SYSTOLIC BLOOD PRESSURE: 120 MMHG | OXYGEN SATURATION: 98 % | RESPIRATION RATE: 16 BRPM | HEART RATE: 84 BPM | BODY MASS INDEX: 31.01 KG/M2 | WEIGHT: 175 LBS

## 2018-04-27 DIAGNOSIS — I10 HYPERTENSION, ESSENTIAL, BENIGN: ICD-10-CM

## 2018-04-27 DIAGNOSIS — E04.1 RIGHT THYROID NODULE: Primary | ICD-10-CM

## 2018-04-27 PROCEDURE — 99213 OFFICE O/P EST LOW 20 MIN: CPT | Performed by: PHYSICIAN ASSISTANT

## 2018-04-27 NOTE — PROGRESS NOTES
Subjective   Arlene Quesada is a 58 y.o. female.     History of Present Illness   Arlene Quesada 58 y.o. female who presents today for routine follow up check and medication refills.  she has a history of   Patient Active Problem List   Diagnosis   • Depression   • Gastroesophageal reflux   • Glaucoma   • Hyperlipemia   • Hypertension, essential, benign   • Low back pain   • Nausea   • Recurrent HSV (herpes simplex virus)   • Neck pain   • Cervical spinal stenosis   • Right thyroid nodule   .  Since the last visit, she has overall felt fairly well.  She has Hypertenision and is well controlled on medication.  she has been compliant with current medications have reviewed them.  The patient denies medication side effects.  She is current on labs  Had MRI C spine 2-19-18  There is a nodule or mass appreciated involving the right lobe of the  thyroid gland. This measures approximately 2 cm in AP dimension and 17  mm in transverse dimension. This area was obscured by a saturation band  on prior examination. Clinical correlation is recommended----a little lumpy right thyroid lobe but unable to feel 2cm nodule on my exam; refer Dr Schmid for u/s and likely biopsy d/t size    Results for orders placed or performed in visit on 03/23/18   POC Urine Drug Screen, Triage   Result Value Ref Range    Methamphetaine Screen, Urine Negative Negative    POC Amphetamines Negative Negative    Barbiturates Screen Negative Negative    Benzodiazepine Screen Positive Negative    Cocaine Screen Negative Negative    Methadone Screen Negative Negative    Opiate Screen Negative Negative    Oxycodone, Screen Negative Negative    Phencyclidine (PCP) Screen Negative Negative    Propoxyphene Screen Negative Negative    THC, Screen Negative Negative    Tricyclic Antidepressants Screen Positive Negative       She is seeing pain management for her neck pain;    She is seeing psychiatrist ;  She is also taking Magnesium from chiropractor  The following  portions of the patient's history were reviewed and updated as appropriate: allergies, current medications, past family history, past medical history, past social history, past surgical history and problem list.    Review of Systems   Constitutional: Negative for activity change, appetite change and unexpected weight change.   HENT: Negative for nosebleeds and trouble swallowing.    Eyes: Negative for pain and visual disturbance.   Respiratory: Negative for chest tightness, shortness of breath and wheezing.    Cardiovascular: Negative for chest pain and palpitations.   Gastrointestinal: Negative for abdominal pain and blood in stool.   Endocrine: Negative.    Genitourinary: Negative for difficulty urinating and hematuria.   Musculoskeletal: Negative for joint swelling.   Skin: Negative for color change and rash.   Allergic/Immunologic: Negative.    Neurological: Negative for syncope and speech difficulty.   Hematological: Negative for adenopathy.   Psychiatric/Behavioral: Positive for agitation and sleep disturbance. Negative for confusion.   All other systems reviewed and are negative.      Objective   Physical Exam   Constitutional: She is oriented to person, place, and time. She appears well-developed and well-nourished. No distress.   HENT:   Head: Normocephalic and atraumatic.   Eyes: Conjunctivae and EOM are normal. Pupils are equal, round, and reactive to light. Right eye exhibits no discharge. Left eye exhibits no discharge. No scleral icterus.   Neck: Normal range of motion. Neck supple. No tracheal deviation present. No thyromegaly present.   Cardiovascular: Normal rate, regular rhythm, normal heart sounds, intact distal pulses and normal pulses.  Exam reveals no gallop.    No murmur heard.  Pulmonary/Chest: Effort normal and breath sounds normal. No respiratory distress. She has no wheezes. She has no rales.   Musculoskeletal: Normal range of motion.   Neurological: She is alert and oriented to person,  place, and time. She exhibits normal muscle tone. Coordination normal.   Skin: Skin is warm. No rash noted. No erythema. No pallor.   Psychiatric: She has a normal mood and affect. Her behavior is normal. Judgment and thought content normal.   Nursing note and vitals reviewed.      Assessment/Plan   Problems Addressed this Visit        Cardiovascular and Mediastinum    Hypertension, essential, benign       Endocrine    Right thyroid nodule - Primary    Relevant Orders    Ambulatory Referral to ENT (Otolaryngology) (Completed)      Other Visit Diagnoses    None.

## 2018-04-27 NOTE — PATIENT INSTRUCTIONS
"Low glycemic index diet  Exercise 30 minutes most days of the week  Make sure you get results on any labs or tests we ordered today  We discussed medications and how to take them as prescribed  Sleep 6-8 hours each night if possible  If you have not signed up for [employer], please activate your code ASAP from your After Visit Summary today    LDL goal <100  LDL goal if heart disease <70  HDL goal >60  Triglyceride goal <150  BP goal =<130/80  Fasting glucose <100    See ENT    \"[facility]\"  Homocystex  capsules.  I get them online only; not sold in store    Also look up [place]  L methyl folate  "

## 2018-04-27 NOTE — PROGRESS NOTES
Subjective   Arlene Quesada is a 58 y.o. female.     History of Present Illness   Arlene Quesada 58 y.o. female who presents today for routine follow up check and medication refills.  she has a history of   Patient Active Problem List   Diagnosis   • Depression   • Gastroesophageal reflux   • Glaucoma   • Hyperlipemia   • Hypertension, essential, benign   • Low back pain   • Nausea   • Recurrent HSV (herpes simplex virus)   • Neck pain   • Cervical spinal stenosis   • Right thyroid nodule   .  Since the last visit, she has overall felt fairly well.  She has Hypertenision and is well controlled on medication and GERD and is well controlled on PPI medication.  she has been compliant with current medications have reviewed them.  The patient denies medication side effects.  2013 AHA (American Heart Association) Cholesterol Risk Ratio Score Goal is <=7.5% and your score is:  3.1% from Feb    Results for orders placed or performed in visit on 03/23/18   POC Urine Drug Screen, Triage   Result Value Ref Range    Methamphetaine Screen, Urine Negative Negative    POC Amphetamines Negative Negative    Barbiturates Screen Negative Negative    Benzodiazepine Screen Positive Negative    Cocaine Screen Negative Negative    Methadone Screen Negative Negative    Opiate Screen Negative Negative    Oxycodone, Screen Negative Negative    Phencyclidine (PCP) Screen Negative Negative    Propoxyphene Screen Negative Negative    THC, Screen Negative Negative    Tricyclic Antidepressants Screen Positive Negative         The following portions of the patient's history were reviewed and updated as appropriate: allergies, current medications, past family history, past medical history, past social history, past surgical history and problem list.    Review of Systems   Constitutional: Negative for activity change, appetite change and unexpected weight change.   HENT: Negative for nosebleeds and trouble swallowing.    Eyes: Negative for pain and  visual disturbance.   Respiratory: Negative for chest tightness, shortness of breath and wheezing.    Cardiovascular: Negative for chest pain and palpitations.   Gastrointestinal: Negative for abdominal pain and blood in stool.   Endocrine: Negative.    Genitourinary: Negative for difficulty urinating and hematuria.   Musculoskeletal: Positive for neck pain. Negative for joint swelling.   Skin: Negative for color change and rash.   Allergic/Immunologic: Negative.    Neurological: Negative for syncope and speech difficulty.   Hematological: Negative for adenopathy.   Psychiatric/Behavioral: Positive for sleep disturbance. Negative for agitation and confusion.   All other systems reviewed and are negative.      Objective   Physical Exam   Constitutional: She is oriented to person, place, and time. She appears well-developed and well-nourished.  Non-toxic appearance. No distress.   HENT:   Head: Normocephalic and atraumatic. Head is without right periorbital erythema and without left periorbital erythema.   Nose: Nose normal.   Mouth/Throat: Oropharynx is clear and moist.   Eyes: Conjunctivae and EOM are normal. Pupils are equal, round, and reactive to light. Right eye exhibits no discharge. Left eye exhibits no discharge. No scleral icterus.   Neck: Normal range of motion. Neck supple.   Cardiovascular: Normal rate, regular rhythm and normal heart sounds.    No murmur heard.  Pulmonary/Chest: Effort normal.   Abdominal: Soft. There is no tenderness.   Musculoskeletal: Normal range of motion. She exhibits no tenderness or deformity.   Neurological: She is alert and oriented to person, place, and time. She has normal reflexes. She displays no atrophy, no tremor and normal reflexes. No cranial nerve deficit. She exhibits normal muscle tone. Coordination normal.   Reflex Scores:       Tricep reflexes are 2+ on the right side and 2+ on the left side.       Bicep reflexes are 2+ on the right side and 2+ on the left side.        Brachioradialis reflexes are 2+ on the right side and 2+ on the left side.       Patellar reflexes are 2+ on the right side and 2+ on the left side.       Achilles reflexes are 2+ on the right side and 2+ on the left side.  Skin: Skin is warm and dry. No rash noted. She is not diaphoretic. No erythema.   Psychiatric: She has a normal mood and affect. Her behavior is normal. Judgment and thought content normal.   Nursing note and vitals reviewed.      Assessment/Plan   Diagnoses and all orders for this visit:    Right thyroid nodule  -     Ambulatory Referral to ENT (Otolaryngology)    Hypertension, essential, benign

## 2018-05-25 ENCOUNTER — OFFICE VISIT (OUTPATIENT)
Dept: PAIN MEDICINE | Facility: CLINIC | Age: 58
End: 2018-05-25

## 2018-05-25 VITALS
WEIGHT: 177 LBS | BODY MASS INDEX: 31.36 KG/M2 | HEIGHT: 63 IN | DIASTOLIC BLOOD PRESSURE: 88 MMHG | SYSTOLIC BLOOD PRESSURE: 133 MMHG | HEART RATE: 81 BPM | TEMPERATURE: 98.3 F | RESPIRATION RATE: 16 BRPM

## 2018-05-25 DIAGNOSIS — M54.2 NECK PAIN: Primary | ICD-10-CM

## 2018-05-25 DIAGNOSIS — M47.812 CERVICAL SPONDYLOSIS WITHOUT MYELOPATHY: ICD-10-CM

## 2018-05-25 DIAGNOSIS — M48.02 CERVICAL SPINAL STENOSIS: ICD-10-CM

## 2018-05-25 PROCEDURE — 99214 OFFICE O/P EST MOD 30 MIN: CPT | Performed by: PAIN MEDICINE

## 2018-05-25 NOTE — PROGRESS NOTES
CHIEF COMPLAINT: Neck Pain    HPI  Arlene Quesada is a 58 y.o. female.  She is here to follow up for Neck Pain    Arlene Quesada is a 58 y.o. female  who presents to the office for follow-up.   Since last visit their pain has worsened.  Pain is interfering with daily activities. Difficulty working due to sudden onset of sharp pain with turning head, performs a lot of paperwork. Trouble driving, sleeping and working.   The patient states their pain is a 6 on a scale of 1-10.  The patient describes this pain as constant dull and ache with intermittent sharp shooting pains.  The pain is located in midline neck and does radiate bilateral arm to bilateral hands. Has numbness and tingling in bilateral hands. This painful problem is aggravated by physical activity, work and turning head and is alleviated by nothing.    Past pain medications: ibuprofen     Current pain medications:   vimovo - helping     Past therapies:  Physical Therapy: yes(helped some)  Chiropractor: yes(didn't help)  Massage Therapy: yes(helped some)  TENS: yes  Neck or back surgery: no  Past pain management: yes([facility] for low back pain in early 2000's)     Previous Injections: low back injections and bilateral hip injections  Effect of Injection (%): didn't help much  Length of Relief:      PEG Assessment   What number best describes your pain on average in the past week? 6  What number best describes how, during the past week, pain has interfered with your enjoyment of life? 7  What number best describes how, during the past week, pain has interfered with your general activity? 7      Current Outpatient Prescriptions:   •  amLODIPine-benazepril (LOTREL 5-10) 5-10 MG per capsule, Take 1 capsule by mouth Daily. For BP, Disp: 30 capsule, Rfl: 5  •  bimatoprost (LUMIGAN) 0.01 % ophthalmic drops, 1 drop nightly. Instill 1 in affected eye once a day (at bedtime), Disp: , Rfl:   •  Calcium Carbonate-Vit D-Min (CALCIUM 1200) 4874-9441 MG-UNIT  chewable tablet, Chew., Disp: , Rfl:   •  chlorhexidine (PERIDEX) 0.12 % solution, , Disp: , Rfl:   •  Cholecalciferol (VITAMIN D3) 2000 UNITS tablet, Take by mouth. Take 1 capsule by oral route daily for 90 days, Disp: , Rfl:   •  desvenlafaxine (PRISTIQ) 100 MG 24 hr tablet, Take 1 tablet by mouth daily., Disp: 30 tablet, Rfl: 5  •  diclofenac (FLECTOR) 1.3 % patch patch, Apply 1 patch topically 2 (Two) Times a Day., Disp: 60 patch, Rfl: 3  •  esomeprazole (NEXIUM) 20 MG capsule, Take 20 mg by mouth. Take 1 capsule (20 mg) by oral route once daily at least 1 hour before a meal for 4 weeks swallowing whole.  Do not crush or chew granules., Disp: , Rfl:   •  fluconazole (DIFLUCAN) 150 MG tablet, , Disp: , Rfl:   •  FLUoxetine (PROzac) 20 MG capsule, , Disp: , Rfl:   •  fluticasone (FLONASE) 50 MCG/ACT nasal spray, 2 sprays into each nostril Daily. Administer 2 sprays in each nostril for each dose. For allergies, Disp: 1 each, Rfl: 11  •  lidocaine (LIDODERM) 5 %, Place 2 patches on the skin Daily. Remove & Discard patch within 12 hours or as directed by MD, Disp: 30 patch, Rfl: 3  •  LORazepam (ATIVAN) 0.5 MG tablet, , Disp: , Rfl:   •  terconazole (TERAZOL 3) 0.8 % vaginal cream, Insert 1 applicator into the vagina Every Night. (put on file), Disp: 20 each, Rfl: 5  •  valACYclovir (VALTREX) 1000 MG tablet, Take 1 tablet by mouth Daily., Disp: 30 tablet, Rfl: 11  •  VIMOVO 500-20 MG tablet delayed-release, , Disp: , Rfl: 1  •  XIIDRA 5 % solution, , Disp: , Rfl:     IMAGING  Cervical mri - 2/2018      Imaging last reviewed: 05/25/18     PFSH:  The following portions of the patient's history were reviewed and updated as appropriate: problem list, past medical history, past surgery history, social history, family history, medications, and allergies    Review of Systems   Constitutional: Negative for fatigue.   HENT: Negative for congestion.    Eyes: Positive for redness (pt has chronic dry eye). Negative for visual  "disturbance.   Respiratory: Negative for cough, shortness of breath and wheezing.    Cardiovascular: Negative.    Gastrointestinal: Negative for constipation and diarrhea.   Genitourinary: Negative for difficulty urinating.   Musculoskeletal: Positive for neck pain and neck stiffness.   Skin: Negative for rash and wound.   Allergic/Immunologic: Negative for immunocompromised state.   Neurological: Positive for weakness and numbness (fingers on both hands).   Hematological: Does not bruise/bleed easily.   Psychiatric/Behavioral: Positive for sleep disturbance. Negative for suicidal ideas. The patient is nervous/anxious.    All other systems reviewed and are negative.      Vitals:    05/25/18 1019   BP: 133/88   Pulse: 81   Resp: 16   Temp: 98.3 °F (36.8 °C)   Weight: 80.3 kg (177 lb)   Height: 160 cm (62.99\")   PainSc:   6   PainLoc: Neck       Physical Exam   Constitutional: She appears well-developed and well-nourished. No distress.   HENT:   Head: Normocephalic and atraumatic.   Nose: Nose normal.   Mouth/Throat: Oropharynx is clear and moist.   Eyes: Conjunctivae and EOM are normal.   Neck: Normal range of motion. Neck supple.   Pulmonary/Chest: Effort normal. No stridor. No respiratory distress.   Musculoskeletal:        Cervical back: She exhibits tenderness, pain and spasm. She exhibits normal range of motion.   +bilateral cervical facet tenderness  +bilateral cervical facet loading    Neurological: She is alert. She has normal strength. No cranial nerve deficit or sensory deficit.   Skin: Skin is warm and dry. No rash noted. She is not diaphoretic.   Psychiatric: She has a normal mood and affect. Her speech is normal and behavior is normal.   Nursing note and vitals reviewed.    Ortho Exam  Neurologic Exam     Mental Status   Speech: speech is normal     Cranial Nerves     CN III, IV, VI   Extraocular motions are normal.     Motor Exam     Strength   Strength 5/5 throughout.     Sensory Exam   Right arm light " touch: decreased from fingers  Left arm light touch: decreased from fingers      Lab Results   Component Value Date    POCMETH Negative 03/23/2018    POCAMPHET Negative 03/23/2018    POCBARBITUR Negative 03/23/2018    POCBENZO Positive 03/23/2018    POCCOCAINE Negative 03/23/2018    POCMETHADO Negative 03/23/2018    POCOPIATES Negative 03/23/2018    POCOXYCODO Negative 03/23/2018    POCPHENCYC Negative 03/23/2018    POCPROPOXY Negative 03/23/2018    POCTHC Negative 03/23/2018    POCTRICYC Positive 03/23/2018     Last UDS results reviewed: 05/25/18   Last UDS: 3/23/2018  Comments: Consistent     Date of last ZIYAD reviewed : 05/25/18   Comments: Consistent         Arlene was seen today for neck pain.    Diagnoses and all orders for this visit:    Neck pain  -     Case Request    Cervical spinal stenosis  -     Case Request    Cervical spondylosis without myelopathy      Requested Prescriptions      No prescriptions requested or ordered in this encounter       - Imaging reviewed with patient.  Has both cervical stenosis and facet disease seen on imaging and physical exam.   - cervical stenosis is best treated with cervical epidural steroid injections where the facet disease is best treated with medial branch blocks. Given she has both on exam, will likely require both to receive any significant pain relief.   - will start with the cervical epidural steroid injection, followed by the cervical medial branch blocks.   - Discussed with the patient regarding the etiology of their pain. Informed them that they would likely benefit from a C5/C6 cervical epidural steroid injection.  The procedure was described in detail and the risks, benefits and alternatives were discussed with the patient (including but not limited to: bleeding, infection, nerve damage, worsening of pain, inability to perform injection, paralysis, seizures, and death) who agreed to proceed. will perform one LEONA, then precede with a CMBB.   - flector patch  denied.   - continue lidoderm patch  - continue vimovo prn. Goal is to off of this medication once pain is better controlled from injections. Goal is not to be on any medication long term given the long term side effects of NSAIDS. Side effects of NSAIDS explained as including but not limited to upset stomach, stomach ulcers, bleeding, kidney failure, fluid retention or high blood pressure.   - This was a 25 minute face to face visit with 15 minutes spent counseling on imaging, disease, looking at spine model, medication, usage and treatment plan.      Wt Readings from Last 3 Encounters:   05/25/18 80.3 kg (177 lb)   04/27/18 79.4 kg (175 lb)   01/17/18 80.3 kg (177 lb)     Body mass index is 31.36 kg/m². Patient counseled on the importance of weight loss to help with overall health and pain control. Patient instructed to attempt weight loss.   Plan: Calorie counting  reduce portion size    Follow-up as needed for pain     Lorraine Zamora MD  Pain Management

## 2018-06-19 ENCOUNTER — TRANSCRIBE ORDERS (OUTPATIENT)
Dept: ADMINISTRATIVE | Facility: HOSPITAL | Age: 58
End: 2018-06-19

## 2018-06-19 ENCOUNTER — LAB (OUTPATIENT)
Dept: LAB | Facility: HOSPITAL | Age: 58
End: 2018-06-19
Attending: OTOLARYNGOLOGY

## 2018-06-19 DIAGNOSIS — E55.9 VITAMIN D DEFICIENCY: ICD-10-CM

## 2018-06-19 DIAGNOSIS — E83.52 HYPERCALCEMIA: Primary | ICD-10-CM

## 2018-06-19 DIAGNOSIS — E83.52 HYPERCALCEMIA: ICD-10-CM

## 2018-06-19 LAB
25(OH)D3 SERPL-MCNC: 49 NG/ML (ref 30–100)
CA-I BLD-MCNC: 5.2 MG/DL (ref 4.6–5.4)
CA-I SERPL ISE-MCNC: 1.29 MMOL/L (ref 1.15–1.35)
CALCIUM SPEC-SCNC: 9.1 MG/DL (ref 8.6–10.5)
PTH-INTACT SERPL-MCNC: 51.1 PG/ML (ref 15–65)

## 2018-06-19 PROCEDURE — 82306 VITAMIN D 25 HYDROXY: CPT

## 2018-06-19 PROCEDURE — 82330 ASSAY OF CALCIUM: CPT

## 2018-06-19 PROCEDURE — 36415 COLL VENOUS BLD VENIPUNCTURE: CPT

## 2018-06-19 PROCEDURE — 82310 ASSAY OF CALCIUM: CPT

## 2018-06-19 PROCEDURE — 83970 ASSAY OF PARATHORMONE: CPT

## 2018-07-02 ENCOUNTER — OUTSIDE FACILITY SERVICE (OUTPATIENT)
Dept: PAIN MEDICINE | Facility: CLINIC | Age: 58
End: 2018-07-02

## 2018-07-02 ENCOUNTER — DOCUMENTATION (OUTPATIENT)
Dept: PAIN MEDICINE | Facility: CLINIC | Age: 58
End: 2018-07-02

## 2018-07-02 PROCEDURE — 62321 NJX INTERLAMINAR CRV/THRC: CPT | Performed by: PAIN MEDICINE

## 2018-07-02 NOTE — PROGRESS NOTES
Cervical Epidural Steroid Injection @ C6-C7  [facility]    PREOPERATIVE DIAGNOSIS: Cervical Spinal Stenosis and Chronic Neck Pain  POSTOPERATIVE DIAGNOSIS:  Same as preop diagnosis    PROCEDURE:   Cervical Epidural Steroid Injection, Therapeutic Translaminar Injection, with epidurogram, at  C6-C7 level    PRE-PROCEDURE DISCUSSION WITH PATIENT:    Risks and complications were discussed with the patient prior to starting the procedure and informed consent was obtained.  We discussed various topics including but not limited to bleeding, infection, injury, paralysis, nerve injury, dural puncture, coma, death, worsening of clinical picture, lack of pain relief, and postprocedural soreness.    SURGEON:  Lorraine Zamora MD    REASON FOR PROCEDURE:    Diagnostic injection at this level is needed and Stenotic area is noted, and is likely contributing to this chronic &/or recurrent pain.     SEDATION:  Patient declined administration of moderate sedation    ANESTHETIC:  NONE  STEROID:   Dexamethasone 8mg    DESCRIPTON OF PROCEDURE:    After obtaining informed consent, I.V. was started in the preop area.   The patient was taken to the operating room and placed in the prone position.  EKG, blood pressure, and pulse oximeter were monitored throughout, and sedation was provided as needed by the RN under my guidance. All pressure points were well padded.  The cervicothoracic spine area was prepped with Chloraprep and draped in a sterile fashion.  Under fluoroscopic guidance, the aforementioned interlaminar space was identified. Skin and subcutaneous tissues were anesthetized with 1% lidocaine in the middle of the space. A Tuohy needle was introduced through the skin and advanced to this interlaminar space and into the epidural space under fluoroscopic guidance and verified with loss-of-resistance technique to air.  After confirming the position of the needle with the fluoroscope with all the views, and after  aspiration was confirmed negative for blood and CSF, 1.5 mL of Omnipaque was injected.  After seeing appropriate epidurogram with lateral and PA views, a total of 3 cc solution was injected, consisting of 1cc of local anesthetic as above, with normal saline and injectable steroid as above.     ESTIMATED BLOOD LOSS:  <5 mL  SPECIMENS:  None    COMPLICATIONS:     No complications were noted., There was no indication of vascular uptake on live injection of contrast dye. and There was no indication of intrathecal uptake on live injection of contrast dye.    TOLERANCE & DISCHARGE CONDITION:    The patient tolerated the procedure well.  The patient was transported to the recovery area without difficulties.  The patient was discharged to home under the care of family in stable and satisfactory condition.    PLAN OF CARE:  1. The patient was given our standard instruction sheet.  2. The patient will Return to clinic 4 wks.  3. The patient will resume all medications as per the medication reconciliation sheet.

## 2018-07-24 ENCOUNTER — OFFICE VISIT (OUTPATIENT)
Dept: PAIN MEDICINE | Facility: CLINIC | Age: 58
End: 2018-07-24

## 2018-07-24 VITALS
TEMPERATURE: 98 F | OXYGEN SATURATION: 97 % | HEIGHT: 63 IN | DIASTOLIC BLOOD PRESSURE: 82 MMHG | WEIGHT: 180 LBS | BODY MASS INDEX: 31.89 KG/M2 | HEART RATE: 78 BPM | RESPIRATION RATE: 18 BRPM | SYSTOLIC BLOOD PRESSURE: 123 MMHG

## 2018-07-24 DIAGNOSIS — M48.02 CERVICAL SPINAL STENOSIS: ICD-10-CM

## 2018-07-24 DIAGNOSIS — M47.812 CERVICAL SPONDYLOSIS WITHOUT MYELOPATHY: Primary | ICD-10-CM

## 2018-07-24 DIAGNOSIS — M54.2 NECK PAIN: ICD-10-CM

## 2018-07-24 PROCEDURE — 99214 OFFICE O/P EST MOD 30 MIN: CPT | Performed by: NURSE PRACTITIONER

## 2018-07-24 RX ORDER — METAXALONE 800 MG/1
800 TABLET ORAL 2 TIMES DAILY PRN
Qty: 60 TABLET | Refills: 1 | Status: SHIPPED | OUTPATIENT
Start: 2018-07-24

## 2018-07-24 RX ORDER — NAPROXEN AND ESOMEPRAZOLE MAGNESIUM 500; 20 MG/1; MG/1
500 TABLET, DELAYED RELEASE ORAL 2 TIMES DAILY
Qty: 60 TABLET | Refills: 1 | Status: SHIPPED | OUTPATIENT
Start: 2018-07-24 | End: 2018-12-11 | Stop reason: SDUPTHER

## 2018-07-24 RX ORDER — NAPROXEN AND ESOMEPRAZOLE MAGNESIUM 500; 20 MG/1; MG/1
500 TABLET, DELAYED RELEASE ORAL 2 TIMES DAILY
Qty: 60 TABLET | Refills: 1 | Status: SHIPPED | OUTPATIENT
Start: 2018-07-24 | End: 2018-07-24 | Stop reason: SDUPTHER

## 2018-07-24 NOTE — PROGRESS NOTES
"CHIEF COMPLAINT  Neck pain is unchanged since last visit. She states she has not received any relief from the injection.  Initial Evaluation by Anusha OSWALD.  Subjective   Arlene Quesada is a 58 y.o. female  who presents to the office for follow-up of procedure.  She completed a  Cervical Epidural Steroid Injection @ C6-C7  on  7/2/18 performed by Dr. LEI for management of neck pain. Patient reports minimal relief from the procedure.     Patient was initially evaluated by Dr. Lei on 3/23/18.  She had a follow-up with Dr. Lei on 5/25/18.  For chronic neck pain.  Plan was to initiate a cervical epidural steroid injections possibly followed by cervical medial branch blocks.  Had previously been prescribed a Flector patch but this was denied.  Was able to obtain of Lidoderm patch.  Was also to continue Vimovo when necessary.    Complains of pain in her neck. Today her pain is 6/10VAS. Describes the pain as continuous and unchanged. However, she notices her arm pain is not as bad. Her midline neck pain is her primary complaint.  Can have pain in her occipital area as well and states 'it can lock up.\" Continues with Vimovo 1/day. Denies any side effects from this except possibly mild constipation. Does notice relief with this. ADL's by self.    Also has arthritis in hands, tendonitis and CTS. Receives hand injections.     Is having surgery 8-10-18. Is having a partial thyroidectomy due to nodule.     MRI CERVICAL SPINE WITHOUT CONTRAST 2-23-18     HISTORY: Neck pain, right radiculopathy.     TECHNIQUE: A noncontrasted MRI examination of the cervical spine was  performed and compared to the MRI examination of 03/29/2011.     FINDINGS: The alignment of the cervical spine is within normal limits.  There is moderate loss of disc height with moderate endplate  degenerative changes at C5-6. The endplate degenerative changes are new.  Disc desiccation at C6-7 is appreciated similar in appearance as  compared to " previous examination.     Signal intensity within the cervical cord is normal on the sagittal T2  sequence.     C2-3: There is no evidence of disc bulge or herniation. Mild-to-moderate  facet degenerative disease is present on the left.     C3-4: There is a central disc protrusion or broad-based herniation which  is more prominent as compared to previous examination. This abuts the  ventral surface of the cord.     C4-5: There is moderate-to-severe facet degenerative disease on the  left. There is a right paramedian disc osteophyte complex which abuts  the ventral surface of the cord resulting in mild flattening of the  ventral surface of the cord. There is moderate neural foraminal  compromise on the right secondary to uncovertebral degenerative disease  which is more prominent as compared to the previous examination.     C5-6: A broad-based disc osteophyte complex is present resulting in  moderate canal stenosis and mild-to-moderate flattening of the ventral  surface of the cord centrally but more so to the right. The disc  osteophyte complex and degree of canal stenosis and deformity of the  cord is slightly more prominent as compared to 03/29/2011. There is  severe neural foraminal compromise on the right secondary to  uncovertebral degenerative disease, also increased slightly as compared  to previous examination.     C6-7: There is a central disc osteophyte complex resulting in flattening  of the ventral surface of the cord similar in appearance as compared to  the previous examination, slightly more prominent to the left. There is  mild neural foraminal compromise on the right secondary to uncovertebral  degenerative disease.     C7-T1: There is mild neural foraminal compromise on the right secondary  to uncovertebral degenerative disease.     There is a nodule or mass appreciated involving the right lobe of the  thyroid gland. This measures approximately 2 cm in AP dimension and 17  mm in transverse  dimension. This area was obscured by a saturation band  on prior examination. Clinical correlation is recommended.     IMPRESSION:  Multilevel degenerative disease involving the cervical spine  as described in detail above including a central disc protrusion or  broad-based herniation at C3-4 which is larger as compared to  03/29/2011, now abutting the ventral surface of the cord. There is  multilevel spinal stenosis and deformity of the cord slightly more  prominent as compared to prior examination, the most severe of which is  at C5-6 and to a lesser extent C4-5 and C6-7. Multilevel neural  foraminal compromise is appreciated, more prominent as compared to  previous examination. See above. Further evaluation could be performed  with a cervical myelogram as indicated.     This report was finalized on 2/23/2018 4:18 PM by Dr. Lincoln Umana MD.    Neck Pain    This is a chronic problem. The current episode started more than 1 year ago. The problem has been waxing and waning. The pain is present in the midline and occipital region. The quality of the pain is described as aching. The pain is at a severity of 6/10. The pain is moderate. The symptoms are aggravated by position and twisting (ROM). The pain is same all the time. Stiffness is present in the morning. Pertinent negatives include no chest pain, fever, headaches, numbness or weakness.      Past pain medications: ibuprofen  Flector patch--denied by insurance     Current pain medications:   vimovo - helping  Lidocaine patches    Past therapies:  Physical Therapy: yes(helped some)  Chiropractor: yes(didn't help)  Massage Therapy: yes(helped some)  TENS: yes  Neck or back surgery: no  Past pain management: yes([facility] for low back pain in early 2000's)     Previous Injections: low back injections and bilateral hip injections  Effect of Injection (%): didn't help much  Length of Relief:     Previous Injections: LEONA 7-24-18  Effect of Injection (%):  "minimal  Length of Relief: minimal      PEG Assessment   What number best describes your pain on average in the past week?5  What number best describes how, during the past week, pain has interfered with your enjoyment of life?4  What number best describes how, during the past week, pain has interfered with your general activity?  4    The following portions of the patient's history were reviewed and updated as appropriate: allergies, current medications, past family history, past medical history, past social history, past surgical history and problem list.    Review of Systems   Constitutional: Negative for chills and fever.   Respiratory: Negative for shortness of breath.    Cardiovascular: Negative for chest pain.   Gastrointestinal: Negative for constipation, diarrhea, nausea and vomiting.   Genitourinary: Negative for difficulty urinating, dyspareunia and dysuria.   Musculoskeletal: Positive for neck pain.   Neurological: Negative for dizziness, weakness, light-headedness, numbness and headaches.   Psychiatric/Behavioral: Negative for confusion, hallucinations, self-injury, sleep disturbance and suicidal ideas. The patient is not nervous/anxious.        Vitals:    07/24/18 1426   BP: 123/82   Pulse: 78   Resp: 18   Temp: 98 °F (36.7 °C)   SpO2: 97%   Weight: 81.6 kg (180 lb)   Height: 160 cm (62.99\")   PainSc:   5   PainLoc: Neck     Objective   Physical Exam   Constitutional: She is oriented to person, place, and time. Vital signs are normal. She appears well-developed and well-nourished. She is cooperative.   HENT:   Head: Normocephalic and atraumatic.   Nose: Nose normal.   Eyes: Conjunctivae and lids are normal.   Neck: Spinous process tenderness (+ cervical facet tenderness; + loading manuever) and muscular tenderness (mild bilateral trapezius with no distinct trigger point) present. Decreased range of motion present.   Mild bilateral occipital tenderness   Cardiovascular: Normal rate.    Pulmonary/Chest: " Effort normal.   Abdominal: Normal appearance.   Musculoskeletal:        Cervical back: She exhibits decreased range of motion, tenderness, bony tenderness and pain.   Head forward posturing   Neurological: She is alert and oriented to person, place, and time. She has normal strength. No cranial nerve deficit. Gait normal.   Reflex Scores:       Bicep reflexes are 1+ on the right side and 1+ on the left side.       Brachioradialis reflexes are 1+ on the right side and 1+ on the left side.  Skin: Skin is warm, dry and intact.   Psychiatric: She has a normal mood and affect. Her speech is normal and behavior is normal. Judgment and thought content normal. Cognition and memory are normal.   Nursing note and vitals reviewed.      Assessment/Plan   Arlene was seen today for neck pain.    Diagnoses and all orders for this visit:    Cervical spondylosis without myelopathy  -     Case Request    Neck pain    Cervical spinal stenosis    Other orders  -     metaxalone (SKELAXIN) 800 MG tablet; Take 1 tablet by mouth 2 (Two) Times a Day As Needed for Muscle Spasms.  -     Discontinue: VIMOVO 500-20 MG tablet delayed-release; Take 500 mg by mouth 2 (Two) Times a Day.  -     VIMOVO 500-20 MG tablet delayed-release; Take 500 mg by mouth 2 (Two) Times a Day.      --- The urine drug screen confirmation from 3-23-18 has been reviewed and the result is APPROPRIATE based on patient history and ZIYAD report  --- bilateral C4-C7 MBB x2, 2-4 weeks apart. No blood thinners. Reviewed the procedure at length with the patient.  Included in the review was expectations, complications, risk and benefits.The procedure was described in detail and the risks, benefits and alternatives were discussed with the patient (including but not limited to: bleeding, infection, nerve damage, worsening of pain, inability to perform injection, paralysis, seizures, and death) who agreed to proceed.  Discussed the potential for sedation if warranted/wanted.   "Questions were answered and in a way the patient could understand.  Patient verbalized understanding and wishes to proceed.  This intervention will be ordered.  --- Trial of Skelaxin 800 mg BID PRN. Discussed medication with the patient.  Included in this discussion was the potential for side effects and adverse events.  Patient verbalized understanding and wished to proceed.  Prescription will be sent to pharmacy.  --- Vimovo 500/20 mg BI. Discussed medication with the patient.  Included in this discussion was the potential for side effects and adverse events.  Patient verbalized understanding and wished to proceed.  Prescription will be sent to pharmacy.  --- May consider OCNB in future PRN. Await results from cervical MBB.  --- Follow-up after procedure or sooner if needed.    -------  Education about Medial Branch Blockade and RF Therapy:    This medial branch blockade (MBB) suggested is intended for diagnostic purposes, with the intent of offering the patient Radiofrequency thermal rhizotomy (RF) if the MBB is diagnostically effective.  The diagnostic blockade is necessary to determine the likelihood that RF therapy could be efficacious in providing long term relief to the patient.    Medial branches are sensory nerve branches that connect to a facet joint and transmit sensations & pain signals from that joint.  Facet is a term for the type of joints found in the spine.  Medial branches are the nerves that go to a facet, and therefore are also sometimes called \"facet joint nerves\" (FJNs).      In a medial branch blockade procedure, xray fluoroscopy is used to verify the locations of the outside of the joint lines which are being targeted.  Under xray guidance, needles are placed to these areas.  Contrast dye is injected to confirm proper placement, with dye flowing over the joint area, and to ensure that the dye does not flow into unintended areas such as a vein.  When this is confirmed, local anesthetic is " injected to block the medial branch at that joint level.      If MBBs are diagnostically successful in blocking pain, then the patient is most likely a great candidate for Radiofrequency of those facet joint nerves.  In the RF procedure, needles are placed to the joint lines in the same fashion, and after testing, the needle tips are heated to thermally treat the nerves, blocking the nerves by in essence damaging the nerves with the heat treatment.       Medically, a successful RF procedure should provide a patient with 50% pain relief or more for at least 6 months.  Clinical experience suggests that successful patients receive relief more in the range of 12 months on average.  We also discussed that a fortunate minority of patients receive therapeutic success from the MBB, and may not require RF ablation.  If a patient receives more than 8 weeks of relief from MBB, then occasional repeat MBB for therapeutic purposes is a very reasonable alternative therapy.  This course of therapy is consistent with our LCDs according to our CMS  in the area, and therefore other insurance providers should follow accordingly.  We will monitor our patients to screen for these therapeutic responders and will offer RF therapy only when necessary.        We discussed that MBB & RF are not without risks.  Guidelines regarding anticoagulant use & neuraxial procedures will be respected.  Patients that are ill or otherwise may be at risk for sepsis will not have their spines accessed by neuraxial injections of any type.  This patient will not be offered these therapies if there is an increased risk.   We discussed that there is a risk of postprocedural pain and also a risk of worsening of clinical picture with these procedures as with any neuraxial procedure.    -------       ZIYAD REPORT      ZIYAD report has been reviewed and scanned into the patient's chart.    As the clinician, I personally reviewed the ZIYAD from  7-23-18 while the patient was in the office today.        EMR Dragon/Transcription disclaimer:   Much of this encounter note is an electronic transcription/translation of spoken language to printed text. The electronic translation of spoken language may permit erroneous, or at times, nonsensical words or phrases to be inadvertently transcribed; Although I have reviewed the note for such errors, some may still exist.

## 2018-07-31 ENCOUNTER — HOSPITAL ENCOUNTER (OUTPATIENT)
Dept: GENERAL RADIOLOGY | Facility: HOSPITAL | Age: 58
Discharge: HOME | End: 2018-07-31
Attending: OTOLARYNGOLOGY

## 2018-07-31 ENCOUNTER — LAB (OUTPATIENT)
Dept: LAB | Facility: HOSPITAL | Age: 58
End: 2018-07-31
Attending: OTOLARYNGOLOGY

## 2018-07-31 ENCOUNTER — HOSPITAL ENCOUNTER (OUTPATIENT)
Dept: CARDIOLOGY | Facility: HOSPITAL | Age: 58
Discharge: HOME | End: 2018-07-31
Attending: OTOLARYNGOLOGY | Admitting: OTOLARYNGOLOGY

## 2018-07-31 ENCOUNTER — TRANSCRIBE ORDERS (OUTPATIENT)
Dept: ADMINISTRATIVE | Facility: HOSPITAL | Age: 58
End: 2018-07-31

## 2018-07-31 DIAGNOSIS — R49.9 VOICE IMPAIRMENT: ICD-10-CM

## 2018-07-31 DIAGNOSIS — E04.1 NONTOXIC UNINODULAR GOITER: ICD-10-CM

## 2018-07-31 DIAGNOSIS — Z01.818 PRE-OP TESTING: ICD-10-CM

## 2018-07-31 DIAGNOSIS — E04.1 NONTOXIC UNINODULAR GOITER: Primary | ICD-10-CM

## 2018-07-31 LAB
ANION GAP SERPL CALCULATED.3IONS-SCNC: 13 MMOL/L
BASOPHILS # BLD AUTO: 0.02 10*3/MM3 (ref 0–0.2)
BASOPHILS NFR BLD AUTO: 0.2 % (ref 0–1.5)
BUN BLD-MCNC: 16 MG/DL (ref 6–20)
BUN/CREAT SERPL: 19.5 (ref 7–25)
CALCIUM SPEC-SCNC: 9.6 MG/DL (ref 8.6–10.5)
CHLORIDE SERPL-SCNC: 102 MMOL/L (ref 98–107)
CO2 SERPL-SCNC: 26 MMOL/L (ref 22–29)
CREAT BLD-MCNC: 0.82 MG/DL (ref 0.57–1)
DEPRECATED RDW RBC AUTO: 47.3 FL (ref 37–54)
EOSINOPHIL # BLD AUTO: 0.43 10*3/MM3 (ref 0–0.7)
EOSINOPHIL NFR BLD AUTO: 4.1 % (ref 0.3–6.2)
ERYTHROCYTE [DISTWIDTH] IN BLOOD BY AUTOMATED COUNT: 14.1 % (ref 11.7–13)
GFR SERPL CREATININE-BSD FRML MDRD: 72 ML/MIN/1.73
GLUCOSE BLD-MCNC: 82 MG/DL (ref 65–99)
HCT VFR BLD AUTO: 37.4 % (ref 35.6–45.5)
HGB BLD-MCNC: 11.8 G/DL (ref 11.9–15.5)
IMM GRANULOCYTES # BLD: 0.03 10*3/MM3 (ref 0–0.03)
IMM GRANULOCYTES NFR BLD: 0.3 % (ref 0–0.5)
LYMPHOCYTES # BLD AUTO: 2.86 10*3/MM3 (ref 0.9–4.8)
LYMPHOCYTES NFR BLD AUTO: 27.4 % (ref 19.6–45.3)
MCH RBC QN AUTO: 29 PG (ref 26.9–32)
MCHC RBC AUTO-ENTMCNC: 31.6 G/DL (ref 32.4–36.3)
MCV RBC AUTO: 91.9 FL (ref 80.5–98.2)
MONOCYTES # BLD AUTO: 0.6 10*3/MM3 (ref 0.2–1.2)
MONOCYTES NFR BLD AUTO: 5.7 % (ref 5–12)
NEUTROPHILS # BLD AUTO: 6.53 10*3/MM3 (ref 1.9–8.1)
NEUTROPHILS NFR BLD AUTO: 62.6 % (ref 42.7–76)
PLATELET # BLD AUTO: 461 10*3/MM3 (ref 140–500)
PMV BLD AUTO: 10.2 FL (ref 6–12)
POTASSIUM BLD-SCNC: 4.1 MMOL/L (ref 3.5–5.2)
PTH-INTACT SERPL-MCNC: 34.6 PG/ML (ref 15–65)
RBC # BLD AUTO: 4.07 10*6/MM3 (ref 3.9–5.2)
SODIUM BLD-SCNC: 141 MMOL/L (ref 136–145)
T3FREE SERPL-MCNC: 2.9 PG/ML (ref 2–4.4)
T4 SERPL-MCNC: 6.4 MCG/DL (ref 4.5–11.7)
TSH SERPL DL<=0.05 MIU/L-ACNC: 0.96 MIU/ML (ref 0.27–4.2)
WBC NRBC COR # BLD: 10.44 10*3/MM3 (ref 4.5–10.7)

## 2018-07-31 PROCEDURE — 93005 ELECTROCARDIOGRAM TRACING: CPT | Performed by: OTOLARYNGOLOGY

## 2018-07-31 PROCEDURE — 93010 ELECTROCARDIOGRAM REPORT: CPT | Performed by: INTERNAL MEDICINE

## 2018-07-31 PROCEDURE — 84481 FREE ASSAY (FT-3): CPT

## 2018-07-31 PROCEDURE — 83970 ASSAY OF PARATHORMONE: CPT

## 2018-07-31 PROCEDURE — 85025 COMPLETE CBC W/AUTO DIFF WBC: CPT

## 2018-07-31 PROCEDURE — 36415 COLL VENOUS BLD VENIPUNCTURE: CPT

## 2018-07-31 PROCEDURE — 82652 VIT D 1 25-DIHYDROXY: CPT

## 2018-07-31 PROCEDURE — 71046 X-RAY EXAM CHEST 2 VIEWS: CPT

## 2018-07-31 PROCEDURE — 80048 BASIC METABOLIC PNL TOTAL CA: CPT

## 2018-07-31 PROCEDURE — 84436 ASSAY OF TOTAL THYROXINE: CPT

## 2018-07-31 PROCEDURE — 84443 ASSAY THYROID STIM HORMONE: CPT

## 2018-08-02 LAB — 1,25(OH)2D3 SERPL-MCNC: 55 PG/ML (ref 19.9–79.3)

## 2018-08-03 ENCOUNTER — PRIOR AUTHORIZATION (OUTPATIENT)
Dept: PAIN MEDICINE | Facility: CLINIC | Age: 58
End: 2018-08-03

## 2018-08-22 RX ORDER — VALACYCLOVIR HYDROCHLORIDE 1 G/1
TABLET, FILM COATED ORAL
Qty: 30 TABLET | Refills: 10 | Status: SHIPPED | OUTPATIENT
Start: 2018-08-22 | End: 2019-05-21 | Stop reason: SDUPTHER

## 2018-08-31 ENCOUNTER — TRANSCRIBE ORDERS (OUTPATIENT)
Dept: ADMINISTRATIVE | Facility: HOSPITAL | Age: 58
End: 2018-08-31

## 2018-08-31 ENCOUNTER — LAB (OUTPATIENT)
Dept: LAB | Facility: HOSPITAL | Age: 58
End: 2018-08-31
Attending: OTOLARYNGOLOGY

## 2018-08-31 DIAGNOSIS — E04.1 THYROID NODULE: Primary | ICD-10-CM

## 2018-08-31 DIAGNOSIS — E04.1 THYROID NODULE: ICD-10-CM

## 2018-08-31 LAB
T3FREE SERPL-MCNC: 2.83 PG/ML (ref 2–4.4)
T4 FREE SERPL-MCNC: 0.82 NG/DL (ref 0.93–1.7)
TSH SERPL DL<=0.05 MIU/L-ACNC: 5.46 MIU/ML (ref 0.27–4.2)

## 2018-08-31 PROCEDURE — 84439 ASSAY OF FREE THYROXINE: CPT

## 2018-08-31 PROCEDURE — 36415 COLL VENOUS BLD VENIPUNCTURE: CPT

## 2018-08-31 PROCEDURE — 84481 FREE ASSAY (FT-3): CPT

## 2018-08-31 PROCEDURE — 84443 ASSAY THYROID STIM HORMONE: CPT

## 2018-09-19 ENCOUNTER — OUTSIDE FACILITY SERVICE (OUTPATIENT)
Dept: PAIN MEDICINE | Facility: CLINIC | Age: 58
End: 2018-09-19

## 2018-09-19 ENCOUNTER — DOCUMENTATION (OUTPATIENT)
Dept: PAIN MEDICINE | Facility: CLINIC | Age: 58
End: 2018-09-19

## 2018-09-19 PROCEDURE — 64490 INJ PARAVERT F JNT C/T 1 LEV: CPT | Performed by: PAIN MEDICINE

## 2018-09-19 PROCEDURE — 64491 INJ PARAVERT F JNT C/T 2 LEV: CPT | Performed by: PAIN MEDICINE

## 2018-09-19 PROCEDURE — 64492 INJ PARAVERT F JNT C/T 3 LEV: CPT | Performed by: PAIN MEDICINE

## 2018-09-19 NOTE — PROGRESS NOTES
Bilateral C4-7 Cervical Medial Branch Blockade  [facility]      PREOPERATIVE DIAGNOSIS:  Cervical spondylosis without myelopathy   POSTOPERATIVE DIAGNOSIS:  Same as preoperative diagnosis    PROCEDURE:    Cervical Facet Nerve (medial branch) Blocks, with Fluoroscopy:  LEVELS C4, C5, C6, and C7, to block facet joints C4-5 and C5-6 and C6-7 bilaterally  • [id number]  - Bilateral Cervical Facet blocks, 1st level  • [id number]  - Bilateral Cervical Facet blocks, 2nd level  • [id number]  - Bilateral Cervical Facet blocks, 3rd level    PRE-PROCEDURE DISCUSSION WITH PATIENT:    Risks and complications were discussed with the patient prior to starting the procedure and informed consent was obtained.    SURGEON:  Lorraine Zamora MD    REASON FOR PROCEDURE:    The patient complains of pain that seems to have a significant axial component Increased neck pain on range of motion exams Pain on extension of the cervical spine    SEDATION:  Patient declined administration of moderate sedation    ANESTHETIC:   Marcaine 0.25%  STEROID:  Dexamethasone 8mg  TOTAL VOLUME OF SOLUTION:  8 mL    DESCRIPTON OF PROCEDURE:  After obtaining informed consent, the patient was placed in the prone position. IV access was obtained.  EKG, blood pressure, and pulse oximeter were monitored and all sedation was administered by an RN under my direct guidance.  The cervical area was prepped with Chloraprep and draped with sterile barrier. Under fluoroscopic guidance the waists of the C4 through C7 vertebra on each side were identified. Skin and subcutaneous tissue was then anesthetized with 1% lidocaine 1mL at each point. Then spinal needles were introduced under fluoroscopic guidance at the waist of these vertebra on one side. After confirming the position of the needle under fluoroscopic PA and lateral views, and confirming negative aspiration of blood and CSF, 0.25 mL of Omnipaque was injected.  Proper spread and lack of vascular  uptake was demonstrated.  A solution was prepared as above, and 1 mL of that solution was injected very slowly each level.   In similar fashion, this procedure was repeated at the same levels on the contralateral side.  Needles were removed intact from all levels.  Vitals were stable throughout.     ESTIMATED BLOOD LOSS:  minimal  SPECIMENS:  None    COMPLICATIONS:    No complications were noted. and There was no indication of vascular uptake on live injection of contrast dye.    TOLERANCE & DISCHARGE CONDITION:    The patient tolerated the procedure well.  The patient was transported to the recovery area without difficulties.  The patient was discharged to home under the care of family in stable and satisfactory condition.      PLAN OF CARE:  1. The patient was given our standard instruction sheet.  2. We discussed that Cervical Medial Branch Blockade is a diagnostic procedure in consideration for radiofrequency ablation if two diagnostic procedures proved to be positive for significant benefit.  If sustained relief of six to eight weeks is obtained, then an alternative plan could be therapeutic cervical medial branch blocks on an as-needed basis.  3. The patient is asked to keep a pain log hourly for 8 hours postoperatively today.  4. The patient will Return to clinic 4-6 wks.  5. The patient will resume all medications as per the medication reconciliation sheet.

## 2018-10-02 RX ORDER — AMLODIPINE BESYLATE AND BENAZEPRIL HYDROCHLORIDE 5; 10 MG/1; MG/1
CAPSULE ORAL
Qty: 30 CAPSULE | Refills: 0 | Status: SHIPPED | OUTPATIENT
Start: 2018-10-02 | End: 2018-10-31 | Stop reason: SDUPTHER

## 2018-10-23 ENCOUNTER — TRANSCRIBE ORDERS (OUTPATIENT)
Dept: ADMINISTRATIVE | Facility: HOSPITAL | Age: 58
End: 2018-10-23

## 2018-10-23 ENCOUNTER — LAB (OUTPATIENT)
Dept: LAB | Facility: HOSPITAL | Age: 58
End: 2018-10-23

## 2018-10-23 DIAGNOSIS — E89.0 POSTSURGICAL HYPOTHYROIDISM: Primary | ICD-10-CM

## 2018-10-23 DIAGNOSIS — E89.0 POSTSURGICAL HYPOTHYROIDISM: ICD-10-CM

## 2018-10-23 LAB
T3FREE SERPL-MCNC: 2.43 PG/ML (ref 2–4.4)
T4 FREE SERPL-MCNC: 1.03 NG/DL (ref 0.93–1.7)
TSH SERPL DL<=0.05 MIU/L-ACNC: 7.79 MIU/ML (ref 0.27–4.2)

## 2018-10-23 PROCEDURE — 84481 FREE ASSAY (FT-3): CPT

## 2018-10-23 PROCEDURE — 36415 COLL VENOUS BLD VENIPUNCTURE: CPT

## 2018-10-23 PROCEDURE — 84443 ASSAY THYROID STIM HORMONE: CPT

## 2018-10-23 PROCEDURE — 84439 ASSAY OF FREE THYROXINE: CPT

## 2018-10-31 RX ORDER — AMLODIPINE BESYLATE AND BENAZEPRIL HYDROCHLORIDE 5; 10 MG/1; MG/1
CAPSULE ORAL
Qty: 30 CAPSULE | Refills: 0 | Status: SHIPPED | OUTPATIENT
Start: 2018-10-31 | End: 2018-11-06 | Stop reason: SDUPTHER

## 2018-11-06 ENCOUNTER — OFFICE VISIT (OUTPATIENT)
Dept: FAMILY MEDICINE CLINIC | Facility: CLINIC | Age: 58
End: 2018-11-06

## 2018-11-06 VITALS
HEART RATE: 79 BPM | OXYGEN SATURATION: 98 % | WEIGHT: 180 LBS | DIASTOLIC BLOOD PRESSURE: 70 MMHG | SYSTOLIC BLOOD PRESSURE: 110 MMHG | RESPIRATION RATE: 16 BRPM | BODY MASS INDEX: 31.89 KG/M2 | HEIGHT: 63 IN | TEMPERATURE: 98.3 F

## 2018-11-06 DIAGNOSIS — K21.9 GASTROESOPHAGEAL REFLUX DISEASE WITHOUT ESOPHAGITIS: ICD-10-CM

## 2018-11-06 DIAGNOSIS — E78.2 MIXED HYPERLIPIDEMIA: ICD-10-CM

## 2018-11-06 DIAGNOSIS — I10 HYPERTENSION, ESSENTIAL, BENIGN: Primary | ICD-10-CM

## 2018-11-06 DIAGNOSIS — Z00.00 LABORATORY EXAMINATION ORDERED AS PART OF A ROUTINE GENERAL MEDICAL EXAMINATION: ICD-10-CM

## 2018-11-06 DIAGNOSIS — L02.92 BOIL: ICD-10-CM

## 2018-11-06 PROCEDURE — 99214 OFFICE O/P EST MOD 30 MIN: CPT | Performed by: PHYSICIAN ASSISTANT

## 2018-11-06 RX ORDER — AMLODIPINE BESYLATE AND BENAZEPRIL HYDROCHLORIDE 5; 10 MG/1; MG/1
1 CAPSULE ORAL DAILY
Qty: 30 CAPSULE | Refills: 5 | Status: SHIPPED | OUTPATIENT
Start: 2018-11-06 | End: 2019-05-21 | Stop reason: SDUPTHER

## 2018-11-06 RX ORDER — DOXYCYCLINE HYCLATE 100 MG/1
100 CAPSULE ORAL 2 TIMES DAILY
Qty: 28 CAPSULE | Refills: 0 | Status: SHIPPED | OUTPATIENT
Start: 2018-11-06 | End: 2019-01-25

## 2018-11-06 RX ORDER — AMLODIPINE BESYLATE AND BENAZEPRIL HYDROCHLORIDE 5; 10 MG/1; MG/1
1 CAPSULE ORAL DAILY
Qty: 30 CAPSULE | Refills: 5 | Status: SHIPPED | OUTPATIENT
Start: 2018-11-06 | End: 2018-11-06 | Stop reason: SDUPTHER

## 2018-11-06 RX ORDER — LEVOTHYROXINE, LIOTHYRONINE 19; 4.5 UG/1; UG/1
TABLET ORAL
COMMUNITY
Start: 2018-10-26 | End: 2019-01-25

## 2018-11-06 NOTE — PROGRESS NOTES
Subjective   Arlene Quesada is a 58 y.o. female.     History of Present Illness   Arlene Quesada 58 y.o. female who presents today for routine follow up check and medication refills.  she has a history of   Patient Active Problem List   Diagnosis   • Depression   • Gastroesophageal reflux   • Glaucoma   • Hyperlipemia   • Hypertension, essential, benign   • Low back pain   • Nausea   • Recurrent HSV (herpes simplex virus)   • Neck pain   • Cervical spinal stenosis   • Right thyroid nodule   • Cervical spondylosis without myelopathy   .  Since the last visit, she has overall felt tired.  She has Hypertenision and is well controlled on medication, Hyperlipidemia and is well controlled on medication and Hypothyroidism and under the care of Endocrinologist.  she has been compliant with current medications have reviewed them.  The patient denies medication side effects.  Last lipid score Feb 3.1%  She is on suppressive Rx HSV  Results for orders placed or performed in visit on 10/23/18   T3, Free   Result Value Ref Range    T3, Free 2.43 2.00 - 4.40 pg/mL   T4, Free   Result Value Ref Range    Free T4 1.03 0.93 - 1.70 ng/dL   TSH   Result Value Ref Range    TSH 7.790 (H) 0.270 - 4.200 mIU/mL   DR Schmid took right side thyroid out    Sees GYN and had hyst  She sees pain management for neck pain  Sees [facility] psych  Saw ortho    The following portions of the patient's history were reviewed and updated as appropriate: allergies, current medications, past family history, past medical history, past social history, past surgical history and problem list.    Review of Systems   Constitutional: Positive for fatigue. Negative for activity change, appetite change and unexpected weight change.   HENT: Negative for nosebleeds and trouble swallowing.    Eyes: Negative for pain and visual disturbance.   Respiratory: Negative for chest tightness, shortness of breath and wheezing.    Cardiovascular: Negative for chest pain and  palpitations.   Gastrointestinal: Negative for abdominal pain and blood in stool.   Endocrine: Negative.    Genitourinary: Negative for difficulty urinating and hematuria.   Musculoskeletal: Negative for joint swelling.   Skin: Positive for wound. Negative for color change and rash.   Allergic/Immunologic: Negative.    Neurological: Negative for syncope and speech difficulty.   Hematological: Negative for adenopathy.   Psychiatric/Behavioral: Negative for agitation and confusion.   All other systems reviewed and are negative.      Objective   Physical Exam   Constitutional: She is oriented to person, place, and time. She appears well-developed and well-nourished. No distress.   HENT:   Head: Normocephalic and atraumatic.   Eyes: Pupils are equal, round, and reactive to light. Conjunctivae and EOM are normal. Right eye exhibits no discharge. Left eye exhibits no discharge. No scleral icterus.   Neck: Normal range of motion. Neck supple. No tracheal deviation present. No thyromegaly present.   Cardiovascular: Normal rate, regular rhythm, normal heart sounds, intact distal pulses and normal pulses.  Exam reveals no gallop.    No murmur heard.  Pulmonary/Chest: Effort normal and breath sounds normal. No respiratory distress. She has no wheezes. She has no rales.   Musculoskeletal: Normal range of motion.   Neurological: She is alert and oriented to person, place, and time. She exhibits normal muscle tone. Coordination normal.   Skin: Skin is warm. No rash noted. There is erythema. No pallor.   Right thigh boil and open in center --pustular bloody drg  Sore; local redness and edema out 2 cm  Did cx   Psychiatric: She has a normal mood and affect. Her behavior is normal. Judgment and thought content normal.   Nursing note and vitals reviewed.    I did start Doxy  Assessment/Plan   Arlene was seen today for abscess.    Diagnoses and all orders for this visit:    Hypertension, essential, benign    Mixed  hyperlipidemia    Gastroesophageal reflux disease without esophagitis    Laboratory examination ordered as part of a routine general medical examination  -     Comprehensive metabolic panel; Future  -     Lipid panel; Future  -     Vitamin B12; Future  -     Folate; Future  -     Vitamin D 25 Hydroxy; Future  -     CBC & Differential; Future    Boil  -     Culture, Routine - Swab, Leg, Right    Other orders  -     Discontinue: amLODIPine-benazepril (LOTREL 5-10) 5-10 MG per capsule; Take 1 capsule by mouth Daily. For BP  -     amLODIPine-benazepril (LOTREL 5-10) 5-10 MG per capsule; Take 1 capsule by mouth Daily. For BP  -     doxycycline (VIBRAMYCIN) 100 MG capsule; Take 1 capsule by mouth 2 (Two) Times a Day. For infection

## 2018-11-11 LAB
BACTERIA SPEC AEROBE CULT: NORMAL
BACTERIA SPEC CULT: NORMAL

## 2018-12-11 ENCOUNTER — OFFICE VISIT (OUTPATIENT)
Dept: PAIN MEDICINE | Facility: CLINIC | Age: 58
End: 2018-12-11

## 2018-12-11 VITALS
HEIGHT: 63 IN | OXYGEN SATURATION: 98 % | BODY MASS INDEX: 32.14 KG/M2 | WEIGHT: 181.4 LBS | RESPIRATION RATE: 15 BRPM | SYSTOLIC BLOOD PRESSURE: 140 MMHG | DIASTOLIC BLOOD PRESSURE: 80 MMHG | TEMPERATURE: 98.2 F | HEART RATE: 95 BPM

## 2018-12-11 DIAGNOSIS — M54.2 NECK PAIN: Primary | ICD-10-CM

## 2018-12-11 DIAGNOSIS — M47.812 CERVICAL SPONDYLOSIS WITHOUT MYELOPATHY: ICD-10-CM

## 2018-12-11 DIAGNOSIS — M48.02 CERVICAL SPINAL STENOSIS: ICD-10-CM

## 2018-12-11 DIAGNOSIS — M19.90 ARTHRITIS: ICD-10-CM

## 2018-12-11 PROCEDURE — 20553 NJX 1/MLT TRIGGER POINTS 3/>: CPT | Performed by: PAIN MEDICINE

## 2018-12-11 PROCEDURE — 99214 OFFICE O/P EST MOD 30 MIN: CPT | Performed by: PAIN MEDICINE

## 2018-12-11 NOTE — PROGRESS NOTES
CHIEF COMPLAINT: Neck Pain    HPI  Arlene Quesada is a 58 y.o. female.  She is here to follow up for Neck Pain    Arlene Quesada is a 58 y.o. female  who presents to the office for follow-up.  She completed a Bilateral C4-7 Cervical Medial Branch Blockade  on 9-19-18. Patient reports 100% relief from the procedure for 1 day only.     Since last visit their pain has increased. Pt states injection helped only for a day. C/O right arm/shoulder pain, aggravated especially when she raises her right arm. This started 1 week ago she states.Stopped vimovo due to not wanting to mask pain after injection.   Worsening right shoulder pain since stopping vimovo - proximal lateral shoulder worse with lifting up RUE.  At the [facility] in the past for right shoulder pain, has not seen since flare up of pain.    The patient states their pain is a 6 on a scale of 1-10.  The patient describes this pain as constant dull, ache, throbbing and soreness.  The pain is located in bilateral neck and does not radiate. This painful problem is aggravated by physical activity and turning head and is alleviated by past injection and pain medication.        Past pain medications: ibuprofen  Flector patch--denied by insurance  vimovo - some help     Current pain medications:   vimovo - helping  Lidocaine patches - uses sparingly     Past therapies:  Physical Therapy: yes(helped some)  Chiropractor: yes(didn't help)  Massage Therapy: yes(helped some)  TENS: yes  Neck or back surgery: no  Past pain management: yes([facility] for low back pain in early 2000's)     Previous Injections: low back injections and bilateral hip injections  Effect of Injection (%): didn't help much  Length of Relief:     Previous Injection: bilateral C4-C7 CMBB - 9/19/2018  Effect of Injection (%): 100%  Length of Relief: 1 day      Previous Injections: [facility] 7-24-18  Effect of Injection (%): minimal  Length of Relief: minimal    PEG Assessment   What number best  describes your pain on average in the past week? 6-7  What number best describes how, during the past week, pain has interfered with your enjoyment of life? 7  What number best describes how, during the past week, pain has interfered with your general activity? 7      Current Outpatient Medications:   •  amLODIPine-benazepril (LOTREL 5-10) 5-10 MG per capsule, Take 1 capsule by mouth Daily. For BP, Disp: 30 capsule, Rfl: 5  •  bimatoprost (LUMIGAN) 0.01 % ophthalmic drops, 1 drop nightly. Instill 1 in affected eye once a day (at bedtime), Disp: , Rfl:   •  Calcium Carbonate-Vit D-Min (CALCIUM 1200) 5943-0917 MG-UNIT chewable tablet, Chew., Disp: , Rfl:   •  chlorhexidine (PERIDEX) 0.12 % solution, , Disp: , Rfl:   •  Cholecalciferol (VITAMIN D3) 2000 UNITS tablet, Take by mouth. Take 1 capsule by oral route daily for 90 days, Disp: , Rfl:   •  desvenlafaxine (PRISTIQ) 100 MG 24 hr tablet, Take 1 tablet by mouth daily., Disp: 30 tablet, Rfl: 5  •  doxycycline (VIBRAMYCIN) 100 MG capsule, Take 1 capsule by mouth 2 (Two) Times a Day. For infection, Disp: 28 capsule, Rfl: 0  •  esomeprazole (NEXIUM) 20 MG capsule, Take 20 mg by mouth. Take 1 capsule (20 mg) by oral route once daily at least 1 hour before a meal for 4 weeks swallowing whole.  Do not crush or chew granules., Disp: , Rfl:   •  fluconazole (DIFLUCAN) 150 MG tablet, , Disp: , Rfl:   •  FLUoxetine (PROzac) 20 MG capsule, , Disp: , Rfl:   •  fluticasone (FLONASE) 50 MCG/ACT nasal spray, 2 sprays into each nostril Daily. Administer 2 sprays in each nostril for each dose. For allergies, Disp: 1 each, Rfl: 11  •  lidocaine (LIDODERM) 5 %, Place 2 patches on the skin Daily. Remove & Discard patch within 12 hours or as directed by MD, Disp: 30 patch, Rfl: 3  •  LORazepam (ATIVAN) 0.5 MG tablet, , Disp: , Rfl:   •  metaxalone (SKELAXIN) 800 MG tablet, Take 1 tablet by mouth 2 (Two) Times a Day As Needed for Muscle Spasms., Disp: 60 tablet, Rfl: 1  •   "Naproxen-Esomeprazole (VIMOVO) 500-20 MG tablet delayed-release, Take 500 mg by mouth 2 (Two) Times a Day., Disp: 60 tablet, Rfl: 3  •  NP THYROID 30 MG tablet, , Disp: , Rfl:   •  terconazole (TERAZOL 3) 0.8 % vaginal cream, Insert 1 applicator into the vagina Every Night. (put on file), Disp: 20 each, Rfl: 5  •  valACYclovir (VALTREX) 1000 MG tablet, TAKE ONE TABLET BY MOUTH DAILY, Disp: 30 tablet, Rfl: 10  •  XIIDRA 5 % solution, , Disp: , Rfl:   •  diclofenac (FLECTOR) 1.3 % patch patch, Apply 1 patch topically to the appropriate area as directed 2 (Two) Times a Day., Disp: 60 patch, Rfl: 3    IMAGING  Cervical mri - 2/2018      Imaging last reviewed: 12/14/18     PFSH:  The following portions of the patient's history were reviewed and updated as appropriate: problem list, past medical history, past surgery history, social history, family history, medications, and allergies    Review of Systems   Constitutional: Negative for chills and fever.   Respiratory: Negative for shortness of breath.    Cardiovascular: Negative for chest pain.   Gastrointestinal: Negative for constipation, diarrhea, nausea and vomiting.   Genitourinary: Negative for difficulty urinating, dyspareunia and dysuria.   Musculoskeletal: Positive for arthralgias and neck pain.   Neurological: Positive for dizziness (a little bit), light-headedness (a little bit), numbness (in fingers) and headaches (sinus headaches and migraines). Negative for weakness.   Psychiatric/Behavioral: Positive for agitation. Negative for confusion, hallucinations, self-injury, sleep disturbance and suicidal ideas. The patient is nervous/anxious.    All other systems reviewed and are negative.      Vitals:    12/11/18 1512   BP: 140/80   Pulse: 95   Resp: 15   Temp: 98.2 °F (36.8 °C)   SpO2: 98%   Weight: 82.3 kg (181 lb 6.4 oz)   Height: 160 cm (62.99\")   PainSc:   6   PainLoc: Neck       Physical Exam   Constitutional: She is oriented to person, place, and time. " Vital signs are normal. She appears well-developed and well-nourished. She is cooperative.   HENT:   Head: Normocephalic and atraumatic.   Nose: Nose normal.   Eyes: Conjunctivae and lids are normal.   Neck: Spinous process tenderness (+ cervical facet tenderness; + loading manuever) and muscular tenderness (mild bilateral trapezius with no distinct trigger point) present. Decreased range of motion present.   bilateral occipital tenderness   Cardiovascular: Normal rate.   Pulmonary/Chest: Effort normal.   Abdominal: Normal appearance.   Musculoskeletal:        Cervical back: She exhibits decreased range of motion, tenderness, bony tenderness and pain. She exhibits no swelling and no deformity.   Neurological: She is alert and oriented to person, place, and time. She has normal strength. No cranial nerve deficit. Coordination and gait normal.   Skin: Skin is warm, dry and intact.   Psychiatric: Her speech is normal. Judgment and thought content normal. Her mood appears anxious. She is hyperactive. Cognition and memory are normal.   Nursing note and vitals reviewed.    Ortho Exam  Neurologic Exam     Mental Status   Oriented to person, place, and time.   Speech: speech is normal     Motor Exam     Strength   Strength 5/5 throughout.       Lab Results   Component Value Date    POCMETH Negative 03/23/2018    POCAMPHET Negative 03/23/2018    POCBARBITUR Negative 03/23/2018    POCBENZO Positive 03/23/2018    POCCOCAINE Negative 03/23/2018    POCMETHADO Negative 03/23/2018    POCOPIATES Negative 03/23/2018    POCOXYCODO Negative 03/23/2018    POCPHENCYC Negative 03/23/2018    POCPROPOXY Negative 03/23/2018    POCTHC Negative 03/23/2018    POCTRICYC Positive 03/23/2018     Last UDS results reviewed: 12/14/18   Last UDS: 3/2018  Comments: Consistent       Date of last ZIYAD reviewed : 12/14/18   Comments: Godwin Jacobs was seen today for neck pain.    Diagnoses and all orders for this visit:    Neck pain  -      Case Request  -     Ambulatory Referral to Neurosurgery  -     trigger point injection  -     diclofenac (FLECTOR) 1.3 % patch patch; Apply 1 patch topically to the appropriate area as directed 2 (Two) Times a Day.  -     Naproxen-Esomeprazole (VIMOVO) 500-20 MG tablet delayed-release; Take 500 mg by mouth 2 (Two) Times a Day.    Cervical spondylosis without myelopathy  -     Case Request  -     Ambulatory Referral to Neurosurgery  -     trigger point injection  -     diclofenac (FLECTOR) 1.3 % patch patch; Apply 1 patch topically to the appropriate area as directed 2 (Two) Times a Day.  -     Naproxen-Esomeprazole (VIMOVO) 500-20 MG tablet delayed-release; Take 500 mg by mouth 2 (Two) Times a Day.    Cervical spinal stenosis  -     trigger point injection  -     diclofenac (FLECTOR) 1.3 % patch patch; Apply 1 patch topically to the appropriate area as directed 2 (Two) Times a Day.  -     Naproxen-Esomeprazole (VIMOVO) 500-20 MG tablet delayed-release; Take 500 mg by mouth 2 (Two) Times a Day.    Arthritis  -     Case Request  -     trigger point injection  -     diclofenac (FLECTOR) 1.3 % patch patch; Apply 1 patch topically to the appropriate area as directed 2 (Two) Times a Day.  -     Naproxen-Esomeprazole (VIMOVO) 500-20 MG tablet delayed-release; Take 500 mg by mouth 2 (Two) Times a Day.      Requested Prescriptions     Signed Prescriptions Disp Refills   • diclofenac (FLECTOR) 1.3 % patch patch 60 patch 3     Sig: Apply 1 patch topically to the appropriate area as directed 2 (Two) Times a Day.   • Naproxen-Esomeprazole (VIMOVO) 500-20 MG tablet delayed-release 60 tablet 3     Sig: Take 500 mg by mouth 2 (Two) Times a Day.     - increase in right shoulder pain. Likely from stopping vimovo. I recommend touching base with ortho for right shoulder. If they can not help, I will start work up with imaging and possible shoulder injection. I will not start this process as she may be already further along with ortho,  "ex: have tried treatments before?  - I recommend restarting vimovo in the meantime. She is concerned about being on NSAIDS for a long period of time.     - reviewed flector patch denial - at time of prescription there was no diagnosis of arthritic neck pain. Will resend with arthritic diagnosis to see if approved.     - discussed diagnostic positivity of first cervical medial branch block.  Patient received almost complete pain relief for 24 hours with the local anesthetic.  This is diagnostically positive for the source of her pain.  will repeat cervical medial branch block, this will be her second injection.  Patient thinks auth was sent off months ago but never hear back? Will follow up on if approval was send for 2nd injection months ago?? Will go ahead and place order and send off again     - she is asking about fixing her neck and I let her know our measures are to help with her pain but are not \"fixing\" anything. She needs a surgical evaluation to see if there are any surgical options.   - will set up with [facility] for further evaluation.  Place referral today.   - She is very anxious on exam today and has multiple questions about her multiple pains and treatment plan.  All questions were answered.  This was a 25 minute face to face visit with 15 minutes spent counseling on medication, usage and treatment plan.      - Patient has significant myofascial cervical pain on exam today.  Will perform cervical trigger point injection today.  Performed and bilateral occipital nerves, bilateral splenius capitis, bilateral trapezius, bilateral rhomboid.    Bupivacaine 0.5% 8 ml Lot#: [id number] Exp: 8/2021  Depo Medrol 40 mg Lot#: [id number] Exp: 4/2020   Trigger point injection  Date/Time: 12/11/2018 4:26 PM  Performed by: Lorraine Zamora MD  Authorized by: Lorraine Zamora MD   Consent: Verbal consent obtained. Written consent obtained.  Risks and benefits: risks, benefits and alternatives were " "discussed  Consent given by: patient  Patient understanding: patient states understanding of the procedure being performed  Patient consent: the patient's understanding of the procedure matches consent given  Patient identity confirmed: verbally with patient  Time out: Immediately prior to procedure a \"time out\" was called to verify the correct patient, procedure, equipment, support staff and site/side marked as required.  Indications: pain relief  Laterality: right  Patient position: sitting  Needle size: 25 G  Location technique: anatomical landmarks  Local Anesthetic: bupivacaine 0.5% without epinephrine  Anesthetic total: 8 mL  Outcome: pain improved  Patient tolerance: Patient tolerated the procedure well with no immediate complications        - Last UDS performed was reviewed and consistent.      Wt Readings from Last 3 Encounters:   12/11/18 82.3 kg (181 lb 6.4 oz)   11/06/18 81.6 kg (180 lb)   07/24/18 81.6 kg (180 lb)     Body mass index is 32.14 kg/m². Patient counseled on the importance of weight loss to help with overall health and pain control. Patient instructed to attempt weight loss.   Plan: Calorie counting  reduce portion size    Follow-up after injection.     Lorraine Zamora MD  Pain Management          "

## 2018-12-12 ENCOUNTER — PRIOR AUTHORIZATION (OUTPATIENT)
Dept: PAIN MEDICINE | Facility: CLINIC | Age: 58
End: 2018-12-12

## 2018-12-12 NOTE — TELEPHONE ENCOUNTER
Sent PA for Vimovo to [facility] through [employer] (Key # [id number]) and waiting for response.    Sent PA for Flector Patch to [facility] through [employer] (Key # [id number]) and waiting on response

## 2018-12-21 ENCOUNTER — LAB (OUTPATIENT)
Dept: LAB | Facility: HOSPITAL | Age: 58
End: 2018-12-21

## 2018-12-21 ENCOUNTER — APPOINTMENT (OUTPATIENT)
Dept: LAB | Facility: HOSPITAL | Age: 58
End: 2018-12-21

## 2018-12-21 DIAGNOSIS — E89.0 POSTSURGICAL HYPOTHYROIDISM: Primary | ICD-10-CM

## 2018-12-21 LAB
T3FREE SERPL-MCNC: 3.57 PG/ML (ref 2–4.4)
T4 FREE SERPL-MCNC: 0.9 NG/DL (ref 0.93–1.7)
TSH SERPL DL<=0.05 MIU/L-ACNC: 5.78 MIU/ML (ref 0.27–4.2)

## 2018-12-21 PROCEDURE — 84481 FREE ASSAY (FT-3): CPT

## 2018-12-21 PROCEDURE — 84443 ASSAY THYROID STIM HORMONE: CPT

## 2018-12-21 PROCEDURE — 84439 ASSAY OF FREE THYROXINE: CPT

## 2018-12-21 PROCEDURE — 36415 COLL VENOUS BLD VENIPUNCTURE: CPT

## 2019-01-02 ENCOUNTER — TELEPHONE (OUTPATIENT)
Dept: PAIN MEDICINE | Facility: CLINIC | Age: 59
End: 2019-01-02

## 2019-01-04 ENCOUNTER — OFFICE VISIT (OUTPATIENT)
Dept: NEUROSURGERY | Facility: CLINIC | Age: 59
End: 2019-01-04

## 2019-01-04 VITALS
DIASTOLIC BLOOD PRESSURE: 77 MMHG | HEIGHT: 63 IN | BODY MASS INDEX: 32.25 KG/M2 | HEART RATE: 80 BPM | RESPIRATION RATE: 18 BRPM | SYSTOLIC BLOOD PRESSURE: 125 MMHG | WEIGHT: 182 LBS

## 2019-01-04 DIAGNOSIS — M48.02 CERVICAL SPINAL STENOSIS: Primary | ICD-10-CM

## 2019-01-04 PROCEDURE — 99243 OFF/OP CNSLTJ NEW/EST LOW 30: CPT | Performed by: NEUROLOGICAL SURGERY

## 2019-01-04 RX ORDER — NORETHINDRONE ACETATE AND ETHINYL ESTRADIOL 1; 5 MG/1; UG/1
TABLET ORAL DAILY
COMMUNITY

## 2019-01-04 RX ORDER — GLUCOSAMINE/CHONDR SU A SOD 1500-1200
LIQUID (ML) ORAL
COMMUNITY

## 2019-01-04 NOTE — PROGRESS NOTES
Subjective   Patient ID: Arlene Quesada is a 58 y.o. female is being seen for consultation today at the request of Lorraine Zamora, * for neck pain. Patient had MRI cervical 2/23/18 and presents unaccompanied.     History of Present Illness 57 yo lady with years of neck pain.  She also has bilateral carpal tunnel and OA of her thumb joints. She reports no radicular complaints per se.  She reports her hand numbness is mostly aggravated by manual tasking.  Denies nocturnal exacerbation.  She has difficult sleep.  She reports her pain 2-7/10 in a given day.  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  Massage is helpful but selene not last.  Leaning forward does exacerbate.  She is a non smoker (former 18 years ago).  SHe reports TOMASA's with limited efficacy.  She is scheduled for facet joint injections.  She did have some trigger point injections with some efficacy as of late.  She takes vimovo daily.  Her pain is partially relieved by pain management and massage.      The following portions of the patient's history were reviewed and updated as appropriate: allergies, current medications, past family history, past medical history, past social history, past surgical history and problem list.    Review of Systems   Constitutional: Negative for chills and fever.   HENT: Negative for tinnitus and trouble swallowing.    Eyes: Positive for pain (dry eye).   Respiratory: Negative for cough, shortness of breath and wheezing.    Cardiovascular: Negative for chest pain and palpitations.   Gastrointestinal: Negative for abdominal pain, nausea and vomiting.   Genitourinary: Negative for difficulty urinating and enuresis.   Musculoskeletal: Positive for neck pain (RUE).   Skin: Negative for rash.   Neurological: Positive for numbness (RUE). Negative for weakness.   Psychiatric/Behavioral: Positive for sleep disturbance.       Objective   Physical Exam   Constitutional: She is oriented to person, place, and time.    Neurological: She is oriented to person, place, and time.     Neurologic Exam     Mental Status   Oriented to person, place, and time.     Motor Exam   Muscle bulk: normal  Overall muscle tone: normal  Right arm tone: normal    Strength   Right deltoid: 5/5  Left deltoid: 5/5  Right biceps: 5/5  Left biceps: 5/5  Right triceps: 5/5  Left triceps: 5/5  Right wrist extension: 5/5  Left wrist extension: 5/5  Right interossei: 5/5  Left interossei: 5/5  Right iliopsoas: 5/5  Left iliopsoas: 5/5  Right quadriceps: 5/5  Left quadriceps: 5/5  Right hamstrin/5  Left hamstrin/5  Right anterior tibial: 5/5  Left anterior tibial: 5/5  Right gastroc: 5/5  Left gastroc: 5/5    She has some paraspinal spasm    Assessment/Plan   Independent Review of Radiographic Studies:  Advanced ddd with some modic changes.  She has various degrees of subtle foraminal encriachment and mild cord contouring. No cord     Medical Decision Making:  Non myelopathic patient with axial neck pain.  Surgical intervention for this axial has limited efficacy.  We discussed  Appendicular versus axial complaints.  We discussed traction or more potent nsaids might assist.  I do not suggest surgery.  We discussed red flags.      There are no diagnoses linked to this encounter.  No Follow-up on file.

## 2019-01-25 ENCOUNTER — OFFICE VISIT (OUTPATIENT)
Dept: PAIN MEDICINE | Facility: CLINIC | Age: 59
End: 2019-01-25

## 2019-01-25 VITALS
OXYGEN SATURATION: 98 % | SYSTOLIC BLOOD PRESSURE: 116 MMHG | HEART RATE: 74 BPM | RESPIRATION RATE: 18 BRPM | HEIGHT: 63 IN | WEIGHT: 179 LBS | TEMPERATURE: 97.5 F | DIASTOLIC BLOOD PRESSURE: 68 MMHG | BODY MASS INDEX: 31.71 KG/M2

## 2019-01-25 DIAGNOSIS — M79.18 MYOFASCIAL MUSCLE PAIN: ICD-10-CM

## 2019-01-25 DIAGNOSIS — M48.02 CERVICAL SPINAL STENOSIS: ICD-10-CM

## 2019-01-25 DIAGNOSIS — M47.812 CERVICAL SPONDYLOSIS WITHOUT MYELOPATHY: ICD-10-CM

## 2019-01-25 DIAGNOSIS — M54.2 NECK PAIN: Primary | ICD-10-CM

## 2019-01-25 PROCEDURE — 20553 NJX 1/MLT TRIGGER POINTS 3/>: CPT | Performed by: PAIN MEDICINE

## 2019-01-25 PROCEDURE — 99214 OFFICE O/P EST MOD 30 MIN: CPT | Performed by: PAIN MEDICINE

## 2019-01-25 RX ORDER — THYROID,PORK 15 MG
TABLET ORAL
COMMUNITY
Start: 2019-01-22

## 2019-01-25 RX ORDER — LORAZEPAM 1 MG/1
TABLET ORAL
COMMUNITY
Start: 2019-01-16 | End: 2021-01-05

## 2019-01-25 NOTE — PROGRESS NOTES
CHIEF COMPLAINT: Neck Pain    HPI  Arlene Quesada is a 58 y.o. female.  She is here to follow up for Neck Pain    Arlene Quesada is a 58 y.o. female  who presents to the office for follow-up.    Since last visit their pain has remain unchanged. At last visit received TPI into neck with gave significant relief of neck pain for several weeks. Pain gradually returned and now back to baseline.   Since last visit has seen MERARI who stated no surgical options at this time for axial pain with no radicular symptoms. Suggested better posture ans possible traction.     The patient states their pain is a 5 on a scale of 1-10.  The patient describes this pain as constant dull, ache, throbbing and soreness.  The pain is located in bilateral neck and does not radiate. This painful problem is aggravated by physical activity and turning head and is alleviated by past injection and pain medication.    Still waiting on approval for CMBB.     Past pain medications: ibuprofen  Flector patch--denied by insurance  vimovo - some help     Current pain medications:   vimovo - helping  Lidocaine patches - uses sparingly     Past therapies:  Physical Therapy: yes- with traction (helped some)  Chiropractor: yes (didn't help)  Massage Therapy: yes (helped some)  TENS: yes  Neck or back surgery: no  Past pain management: yes ([facility] for low back pain in early 2000's)     Previous Injection: trigger point injections on 12/11/18  Effect of Injection (%): about 40%- several weeks    Previous Injections: low back injections and bilateral hip injections  Effect of Injection (%): didn't help much     Previous Injection: bilateral C4-C7 CMBB - 9/19/2018  Effect of Injection (%): 100%  Length of Relief: 1 day      Previous Injections: LEONA 7-24-18  Effect of Injection (%): minimal  Length of Relief: minimal    PEG Assessment   What number best describes your pain on average in the past week? 6  What number best describes how, during the past  week, pain has interfered with your enjoyment of life? 6  What number best describes how, during the past week, pain has interfered with your general activity? 6      Current Outpatient Medications:   •  amLODIPine-benazepril (LOTREL 5-10) 5-10 MG per capsule, Take 1 capsule by mouth Daily. For BP, Disp: 30 capsule, Rfl: 5  •  bimatoprost (LUMIGAN) 0.01 % ophthalmic drops, 1 drop nightly. Instill 1 in affected eye once a day (at bedtime), Disp: , Rfl:   •  Calcium Carbonate-Vit D-Min (CALCIUM 1200) 4618-6873 MG-UNIT chewable tablet, Chew., Disp: , Rfl:   •  Cholecalciferol (VITAMIN D3) 2000 UNITS tablet, Take by mouth. Take 1 capsule by oral route daily for 90 days, Disp: , Rfl:   •  desvenlafaxine (PRISTIQ) 100 MG 24 hr tablet, Take 1 tablet by mouth daily., Disp: 30 tablet, Rfl: 5  •  esomeprazole (NEXIUM) 20 MG capsule, Take 20 mg by mouth. Take 1 capsule (20 mg) by oral route once daily at least 1 hour before a meal for 4 weeks swallowing whole.  Do not crush or chew granules., Disp: , Rfl:   •  FLUoxetine (PROzac) 20 MG capsule, , Disp: , Rfl:   •  fluticasone (FLONASE) 50 MCG/ACT nasal spray, 2 sprays into each nostril Daily. Administer 2 sprays in each nostril for each dose. For allergies, Disp: 1 each, Rfl: 11  •  Glucosamine HCl-MSM (GLUCOSAMINE-MSM) 1500-500 MG/30ML liquid, Take  by mouth., Disp: , Rfl:   •  lidocaine (LIDODERM) 5 %, Place 2 patches on the skin Daily. Remove & Discard patch within 12 hours or as directed by MD, Disp: 30 patch, Rfl: 3  •  LORazepam (ATIVAN) 0.5 MG tablet, , Disp: , Rfl:   •  metaxalone (SKELAXIN) 800 MG tablet, Take 1 tablet by mouth 2 (Two) Times a Day As Needed for Muscle Spasms., Disp: 60 tablet, Rfl: 1  •  norethindrone-ethinyl estradiol (FEMHRT 1/5) 1-5 MG-MCG tablet, Take  by mouth Daily., Disp: , Rfl:   •  terconazole (TERAZOL 3) 0.8 % vaginal cream, Insert 1 applicator into the vagina Every Night. (put on file), Disp: 20 each, Rfl: 5  •  Testosterone Propionate  "(FIRST-TESTOSTERONE) 2 % ointment, Place  on the skin as directed by provider., Disp: , Rfl:   •  valACYclovir (VALTREX) 1000 MG tablet, TAKE ONE TABLET BY MOUTH DAILY, Disp: 30 tablet, Rfl: 10  •  XIIDRA 5 % solution, , Disp: , Rfl:   •  ARMOUR THYROID 15 MG tablet, , Disp: , Rfl:   •  fluconazole (DIFLUCAN) 150 MG tablet, , Disp: , Rfl:   •  LORazepam (ATIVAN) 1 MG tablet, , Disp: , Rfl:     IMAGING  Cervical mri - 2/2018       Imaging last reviewed: 01/25/19     PFSH:  The following portions of the patient's history were reviewed and updated as appropriate: problem list, past medical history, past surgery history, social history, family history, medications, and allergies    Review of Systems   Constitutional: Negative for fatigue.   HENT: Negative for congestion.    Eyes: Negative for visual disturbance.   Respiratory: Negative for cough, shortness of breath and wheezing.    Cardiovascular: Negative.    Gastrointestinal: Negative for constipation and diarrhea.   Genitourinary: Negative for difficulty urinating.   Musculoskeletal: Positive for neck pain.   Skin: Negative for rash and wound.   Allergic/Immunologic: Negative for immunocompromised state.   Neurological: Positive for weakness and numbness (fingers on right hand).   Hematological: Does not bruise/bleed easily.   Psychiatric/Behavioral: Negative for sleep disturbance and suicidal ideas. The patient is not nervous/anxious.    All other systems reviewed and are negative.      Vitals:    01/25/19 1531   BP: 116/68   Pulse: 74   Resp: 18   Temp: 97.5 °F (36.4 °C)   SpO2: 98%   Weight: 81.2 kg (179 lb)   Height: 160 cm (63\")   PainSc:   5   PainLoc: Neck       Physical Exam   Constitutional: She is oriented to person, place, and time. Vital signs are normal. She appears well-developed and well-nourished. She is cooperative.   HENT:   Head: Normocephalic and atraumatic.   Nose: Nose normal.   Eyes: Conjunctivae and lids are normal.   Neck: Spinous process " tenderness (+ cervical facet tenderness; + loading manuever) and muscular tenderness present. Decreased range of motion present.   Right sided occipital tenderness   Cardiovascular: Normal rate.   Pulmonary/Chest: Effort normal.   Abdominal: Normal appearance.   Musculoskeletal:        Cervical back: She exhibits decreased range of motion, tenderness, bony tenderness and pain. She exhibits no swelling and no deformity.   Neurological: She is alert and oriented to person, place, and time. She has normal strength. No cranial nerve deficit. Coordination and gait normal.   Skin: Skin is warm, dry and intact.   Psychiatric: Her speech is normal. Judgment and thought content normal. Her mood appears anxious. She is hyperactive. Cognition and memory are normal.   Nursing note and vitals reviewed.    Ortho Exam  Neurologic Exam     Mental Status   Oriented to person, place, and time.   Speech: speech is normal     Motor Exam     Strength   Strength 5/5 throughout.       Lab Results   Component Value Date    POCMETH Negative 03/23/2018    POCAMPHET Negative 03/23/2018    POCBARBITUR Negative 03/23/2018    POCBENZO Positive 03/23/2018    POCCOCAINE Negative 03/23/2018    POCMETHADO Negative 03/23/2018    POCOPIATES Negative 03/23/2018    POCOXYCODO Negative 03/23/2018    POCPHENCYC Negative 03/23/2018    POCPROPOXY Negative 03/23/2018    POCTHC Negative 03/23/2018    POCTRICYC Positive 03/23/2018     Last UDS results reviewed: 01/25/19   Last UDS: 3/2018  Comments: Consistent     Date of last ZIYAD reviewed : 01/25/19   Comments: Godwin Umanai was seen today for neck pain.    Diagnoses and all orders for this visit:    Neck pain    Cervical spondylosis without myelopathy    Cervical spinal stenosis    Other orders  -     trigger point      Requested Prescriptions      No prescriptions requested or ordered in this encounter     - Imaging reviewed with patient.  Discussed her pain is multifactorial.     - good  "relief with vimovo in past - denied by insurance.  paying out of pocket.   - denied flector patches.   - on lidocaine patches - helping.      - discussed diagnostic positivity of first cervical medial branch block.  Patient received almost complete pain relief for 24 hours with the local anesthetic.  This is diagnostically positive for the source of her pain. Order placed last visit for 2nd injection. Having insurance problems, new card on file and will run with approp card today.     - surgical evaluation reviewed - no surgical options at this time.     - She is very anxious on exam today and has multiple questions about her multiple pains and treatment plan.  All questions were answered.  This was a 25 minute face to face visit with 15 minutes spent counseling on medication, usage and treatment plan.       - Patient has significant myofascial cervical pain on exam today.  Will perform cervical trigger point injection today.  Performed and bilateral occipital nerves, bilateral splenius capitis, bilateral trapezius, bilateral rhomboid.  - will repeat today as we wait for CMBB to be approved. Discussed after CMBB she will need steroid break for several months. Luckily this will be her 2nd CMBB injection and the RFA will not be a steroid injection.     Trigger point  Date/Time: 1/25/2019 4:13 PM  Performed by: Lorraine Zamora MD  Authorized by: Lorraine Zamora MD   Consent: Verbal consent obtained. Written consent obtained.  Risks and benefits: risks, benefits and alternatives were discussed  Consent given by: patient  Patient understanding: patient states understanding of the procedure being performed  Patient consent: the patient's understanding of the procedure matches consent given  Patient identity confirmed: verbally with patient  Time out: Immediately prior to procedure a \"time out\" was called to verify the correct patient, procedure, equipment, support staff and site/side marked as " required.  Indications: pain relief  Patient position: sitting  Needle size: 25 G  Location technique: anatomical landmarks  Outcome: pain improved  Patient tolerance: Patient tolerated the procedure well with no immediate complications      Lidocaine 1% Lot#: [id number] Exp: 12/1/2019  Depo Medrol 40 mg Lot#: [id number] Exp: 5/2020     Wt Readings from Last 3 Encounters:   01/25/19 81.2 kg (179 lb)   01/04/19 82.6 kg (182 lb)   12/11/18 82.3 kg (181 lb 6.4 oz)     Body mass index is 31.71 kg/m². Patient counseled on the importance of weight loss to help with overall health and pain control. Patient instructed to attempt weight loss.   Plan: Calorie counting  reduce screen time, reduce portion size and cut out extra servings    Follow-up after CMBB    Lorraine Zamora MD  Pain Management

## 2019-02-01 ENCOUNTER — RESULTS ENCOUNTER (OUTPATIENT)
Dept: FAMILY MEDICINE CLINIC | Facility: CLINIC | Age: 59
End: 2019-02-01

## 2019-02-01 DIAGNOSIS — Z00.00 LABORATORY EXAMINATION ORDERED AS PART OF A ROUTINE GENERAL MEDICAL EXAMINATION: ICD-10-CM

## 2019-02-02 RX ORDER — LEVOFLOXACIN 500 MG/1
TABLET, FILM COATED ORAL
Qty: 10 TABLET | Refills: 0 | Status: SHIPPED | OUTPATIENT
Start: 2019-02-02 | End: 2019-05-21

## 2019-02-11 ENCOUNTER — TRANSCRIBE ORDERS (OUTPATIENT)
Dept: ADMINISTRATIVE | Facility: HOSPITAL | Age: 59
End: 2019-02-11

## 2019-02-11 ENCOUNTER — LAB (OUTPATIENT)
Dept: LAB | Facility: HOSPITAL | Age: 59
End: 2019-02-11

## 2019-02-11 DIAGNOSIS — E04.1 THYROID NODULE: Primary | ICD-10-CM

## 2019-02-11 DIAGNOSIS — E04.1 THYROID NODULE: ICD-10-CM

## 2019-02-11 LAB
T4 FREE SERPL-MCNC: 0.78 NG/DL (ref 0.93–1.7)
TSH SERPL DL<=0.05 MIU/L-ACNC: 3 MIU/ML (ref 0.27–4.2)

## 2019-02-11 PROCEDURE — 84481 FREE ASSAY (FT-3): CPT

## 2019-02-11 PROCEDURE — 84443 ASSAY THYROID STIM HORMONE: CPT

## 2019-02-11 PROCEDURE — 84439 ASSAY OF FREE THYROXINE: CPT

## 2019-02-11 PROCEDURE — 36415 COLL VENOUS BLD VENIPUNCTURE: CPT

## 2019-02-12 LAB — T3FREE SERPL-MCNC: 2.83 PG/ML (ref 2–4.4)

## 2019-03-02 ENCOUNTER — APPOINTMENT (OUTPATIENT)
Dept: LAB | Facility: HOSPITAL | Age: 59
End: 2019-03-02

## 2019-03-02 LAB
25(OH)D3 SERPL-MCNC: 48.9 NG/ML (ref 30–100)
ALBUMIN SERPL-MCNC: 4.1 G/DL (ref 3.5–5.2)
ALBUMIN/GLOB SERPL: 1.3 G/DL
ALP SERPL-CCNC: 95 U/L (ref 39–117)
ALT SERPL W P-5'-P-CCNC: 39 U/L (ref 1–33)
ANION GAP SERPL CALCULATED.3IONS-SCNC: 10 MMOL/L
AST SERPL-CCNC: 27 U/L (ref 1–32)
BASOPHILS # BLD AUTO: 0.02 10*3/MM3 (ref 0–0.2)
BASOPHILS NFR BLD AUTO: 0.3 % (ref 0–1.5)
BILIRUB SERPL-MCNC: <0.2 MG/DL (ref 0.1–1.2)
BUN BLD-MCNC: 13 MG/DL (ref 6–20)
BUN/CREAT SERPL: 14.8 (ref 7–25)
CALCIUM SPEC-SCNC: 8.9 MG/DL (ref 8.6–10.5)
CHLORIDE SERPL-SCNC: 105 MMOL/L (ref 98–107)
CHOLEST SERPL-MCNC: 178 MG/DL (ref 0–200)
CO2 SERPL-SCNC: 26 MMOL/L (ref 22–29)
CREAT BLD-MCNC: 0.88 MG/DL (ref 0.57–1)
DEPRECATED RDW RBC AUTO: 49.3 FL (ref 37–54)
EOSINOPHIL # BLD AUTO: 0.37 10*3/MM3 (ref 0–0.4)
EOSINOPHIL NFR BLD AUTO: 5 % (ref 0.3–6.2)
ERYTHROCYTE [DISTWIDTH] IN BLOOD BY AUTOMATED COUNT: 14.2 % (ref 12.3–15.4)
FOLATE SERPL-MCNC: 13.7 NG/ML (ref 4.78–24.2)
GFR SERPL CREATININE-BSD FRML MDRD: 66 ML/MIN/1.73
GLOBULIN UR ELPH-MCNC: 3.1 GM/DL
GLUCOSE BLD-MCNC: 105 MG/DL (ref 65–99)
HCT VFR BLD AUTO: 37.9 % (ref 34–46.6)
HDLC SERPL-MCNC: 37 MG/DL (ref 40–60)
HGB BLD-MCNC: 11.5 G/DL (ref 12–15.9)
IMM GRANULOCYTES # BLD AUTO: 0.02 10*3/MM3 (ref 0–0.05)
IMM GRANULOCYTES NFR BLD AUTO: 0.3 % (ref 0–0.5)
LDLC SERPL CALC-MCNC: 115 MG/DL (ref 0–100)
LDLC/HDLC SERPL: 3.12 {RATIO}
LYMPHOCYTES # BLD AUTO: 2.06 10*3/MM3 (ref 0.7–3.1)
LYMPHOCYTES NFR BLD AUTO: 27.6 % (ref 19.6–45.3)
MCH RBC QN AUTO: 28.8 PG (ref 26.6–33)
MCHC RBC AUTO-ENTMCNC: 30.3 G/DL (ref 31.5–35.7)
MCV RBC AUTO: 95 FL (ref 79–97)
MONOCYTES # BLD AUTO: 0.65 10*3/MM3 (ref 0.1–0.9)
MONOCYTES NFR BLD AUTO: 8.7 % (ref 5–12)
NEUTROPHILS # BLD AUTO: 4.35 10*3/MM3 (ref 1.4–7)
NEUTROPHILS NFR BLD AUTO: 58.1 % (ref 42.7–76)
NRBC BLD AUTO-RTO: 0 /100 WBC (ref 0–0)
PLATELET # BLD AUTO: 453 10*3/MM3 (ref 140–450)
PMV BLD AUTO: 9.7 FL (ref 6–12)
POTASSIUM BLD-SCNC: 4.6 MMOL/L (ref 3.5–5.2)
PROT SERPL-MCNC: 7.2 G/DL (ref 6–8.5)
RBC # BLD AUTO: 3.99 10*6/MM3 (ref 3.77–5.28)
SODIUM BLD-SCNC: 141 MMOL/L (ref 136–145)
TRIGL SERPL-MCNC: 128 MG/DL (ref 0–150)
VIT B12 BLD-MCNC: 790 PG/ML (ref 211–946)
VLDLC SERPL-MCNC: 25.6 MG/DL (ref 5–40)
WBC NRBC COR # BLD: 7.47 10*3/MM3 (ref 3.4–10.8)

## 2019-03-02 PROCEDURE — 82306 VITAMIN D 25 HYDROXY: CPT | Performed by: PHYSICIAN ASSISTANT

## 2019-03-02 PROCEDURE — 80061 LIPID PANEL: CPT | Performed by: PHYSICIAN ASSISTANT

## 2019-03-02 PROCEDURE — 80053 COMPREHEN METABOLIC PANEL: CPT | Performed by: PHYSICIAN ASSISTANT

## 2019-03-02 PROCEDURE — 82746 ASSAY OF FOLIC ACID SERUM: CPT | Performed by: PHYSICIAN ASSISTANT

## 2019-03-02 PROCEDURE — 85025 COMPLETE CBC W/AUTO DIFF WBC: CPT | Performed by: PHYSICIAN ASSISTANT

## 2019-03-02 PROCEDURE — 36415 COLL VENOUS BLD VENIPUNCTURE: CPT | Performed by: PHYSICIAN ASSISTANT

## 2019-03-02 PROCEDURE — 82607 VITAMIN B-12: CPT | Performed by: PHYSICIAN ASSISTANT

## 2019-03-13 DIAGNOSIS — D64.9 LOW HEMOGLOBIN: Primary | ICD-10-CM

## 2019-03-26 ENCOUNTER — LAB (OUTPATIENT)
Dept: LAB | Facility: HOSPITAL | Age: 59
End: 2019-03-26

## 2019-03-26 ENCOUNTER — TRANSCRIBE ORDERS (OUTPATIENT)
Dept: ADMINISTRATIVE | Facility: HOSPITAL | Age: 59
End: 2019-03-26

## 2019-03-26 DIAGNOSIS — E89.0 POSTSURGICAL HYPOTHYROIDISM: Primary | ICD-10-CM

## 2019-03-26 DIAGNOSIS — E89.0 POSTSURGICAL HYPOTHYROIDISM: ICD-10-CM

## 2019-03-26 LAB
T3FREE SERPL-MCNC: 2.81 PG/ML (ref 2–4.4)
T4 FREE SERPL-MCNC: 0.81 NG/DL (ref 0.93–1.7)
TSH SERPL DL<=0.05 MIU/L-ACNC: 3.67 MIU/ML (ref 0.27–4.2)

## 2019-03-26 PROCEDURE — 84443 ASSAY THYROID STIM HORMONE: CPT

## 2019-03-26 PROCEDURE — 84481 FREE ASSAY (FT-3): CPT

## 2019-03-26 PROCEDURE — 36415 COLL VENOUS BLD VENIPUNCTURE: CPT

## 2019-03-26 PROCEDURE — 84439 ASSAY OF FREE THYROXINE: CPT

## 2019-03-26 PROCEDURE — 86376 MICROSOMAL ANTIBODY EACH: CPT

## 2019-03-28 LAB — THYROPEROXIDASE AB SERPL-ACNC: 10 IU/ML (ref 0–34)

## 2019-04-30 ENCOUNTER — LAB (OUTPATIENT)
Dept: LAB | Facility: HOSPITAL | Age: 59
End: 2019-04-30

## 2019-04-30 ENCOUNTER — TRANSCRIBE ORDERS (OUTPATIENT)
Dept: ADMINISTRATIVE | Facility: HOSPITAL | Age: 59
End: 2019-04-30

## 2019-04-30 DIAGNOSIS — E89.0 POSTSURGICAL HYPOTHYROIDISM: ICD-10-CM

## 2019-04-30 DIAGNOSIS — E04.1 THYROID NODULE: ICD-10-CM

## 2019-04-30 DIAGNOSIS — E89.0 POSTSURGICAL HYPOTHYROIDISM: Primary | ICD-10-CM

## 2019-04-30 LAB
FERRITIN SERPL-MCNC: 20.6 NG/ML (ref 13–150)
IRON 24H UR-MRATE: 63 MCG/DL (ref 37–145)
MAGNESIUM SERPL-MCNC: 2.2 MG/DL (ref 1.6–2.6)
T3FREE SERPL-MCNC: 2.54 PG/ML (ref 2–4.4)
T4 FREE SERPL-MCNC: 0.68 NG/DL (ref 0.93–1.7)
TSH SERPL DL<=0.05 MIU/L-ACNC: 2.32 MIU/ML (ref 0.27–4.2)

## 2019-04-30 PROCEDURE — 82728 ASSAY OF FERRITIN: CPT

## 2019-04-30 PROCEDURE — 83540 ASSAY OF IRON: CPT

## 2019-04-30 PROCEDURE — 84443 ASSAY THYROID STIM HORMONE: CPT

## 2019-04-30 PROCEDURE — 84630 ASSAY OF ZINC: CPT

## 2019-04-30 PROCEDURE — 83735 ASSAY OF MAGNESIUM: CPT

## 2019-04-30 PROCEDURE — 36415 COLL VENOUS BLD VENIPUNCTURE: CPT

## 2019-04-30 PROCEDURE — 84481 FREE ASSAY (FT-3): CPT

## 2019-04-30 PROCEDURE — 84439 ASSAY OF FREE THYROXINE: CPT

## 2019-04-30 PROCEDURE — 82530 CORTISOL FREE: CPT

## 2019-04-30 PROCEDURE — 84255 ASSAY OF SELENIUM: CPT

## 2019-05-01 LAB — SELENIUM SERPL-MCNC: 131 UG/L (ref 91–198)

## 2019-05-02 LAB — ZINC SERPL-MCNC: 77 UG/DL (ref 56–134)

## 2019-05-04 LAB — CORTIS F SERPL-MCNC: 0.17 UG/DL

## 2019-05-21 ENCOUNTER — OFFICE VISIT (OUTPATIENT)
Dept: FAMILY MEDICINE CLINIC | Facility: CLINIC | Age: 59
End: 2019-05-21

## 2019-05-21 VITALS
RESPIRATION RATE: 16 BRPM | SYSTOLIC BLOOD PRESSURE: 110 MMHG | DIASTOLIC BLOOD PRESSURE: 78 MMHG | HEIGHT: 63 IN | OXYGEN SATURATION: 98 % | HEART RATE: 72 BPM | BODY MASS INDEX: 31.54 KG/M2 | WEIGHT: 178 LBS | TEMPERATURE: 98.1 F

## 2019-05-21 DIAGNOSIS — D64.9 LOW HEMOGLOBIN: ICD-10-CM

## 2019-05-21 DIAGNOSIS — B00.9 RECURRENT HSV (HERPES SIMPLEX VIRUS): ICD-10-CM

## 2019-05-21 DIAGNOSIS — E78.2 MIXED HYPERLIPIDEMIA: ICD-10-CM

## 2019-05-21 DIAGNOSIS — K21.9 GASTROESOPHAGEAL REFLUX DISEASE WITHOUT ESOPHAGITIS: ICD-10-CM

## 2019-05-21 DIAGNOSIS — I10 HYPERTENSION, ESSENTIAL, BENIGN: Primary | ICD-10-CM

## 2019-05-21 PROCEDURE — 99213 OFFICE O/P EST LOW 20 MIN: CPT | Performed by: PHYSICIAN ASSISTANT

## 2019-05-21 RX ORDER — AMLODIPINE BESYLATE AND BENAZEPRIL HYDROCHLORIDE 5; 10 MG/1; MG/1
1 CAPSULE ORAL DAILY
Qty: 30 CAPSULE | Refills: 5 | Status: SHIPPED | OUTPATIENT
Start: 2019-05-21 | End: 2019-12-17 | Stop reason: SDUPTHER

## 2019-05-21 RX ORDER — VALACYCLOVIR HYDROCHLORIDE 1 G/1
1000 TABLET, FILM COATED ORAL DAILY
Qty: 30 TABLET | Refills: 11 | Status: SHIPPED | OUTPATIENT
Start: 2019-05-21 | End: 2020-07-07 | Stop reason: SDUPTHER

## 2019-05-21 NOTE — PROGRESS NOTES
Subjective   Arlene Quesada is a 59 y.o. female.     History of Present Illness   Arlene Quesada 59 y.o. female who presents today for routine follow up check and medication refills.  she has a history of   Patient Active Problem List   Diagnosis   • Depression   • Gastroesophageal reflux   • Glaucoma   • Hyperlipemia   • Hypertension, essential, benign   • Low back pain   • Nausea   • Recurrent HSV (herpes simplex virus)   • Neck pain   • Cervical spinal stenosis   • Right thyroid nodule   • Cervical spondylosis without myelopathy   • Arthritis   .  Since the last visit, she has overall felt tired.  She has Hypertenision and is well controlled on medication, GERD and is well controlled on PPI medication, Hyperlipidemia and working on this with diet and exercise, Hypothyroidism and under the care of Endocrinologist, Seasonal allergies and is doing well on their medication PRN and Vitamin D deficiency and well controlled on medication and labs at goal >30.  she has been compliant with current medications have reviewed them.  The patient denies medication side effects.    Results for orders placed or performed in visit on 04/30/19   T3, Free   Result Value Ref Range    T3, Free 2.54 2.00 - 4.40 pg/mL   T4, Free   Result Value Ref Range    Free T4 0.68 (L) 0.93 - 1.70 ng/dL   TSH   Result Value Ref Range    TSH 2.320 0.270 - 4.200 mIU/mL   Iron   Result Value Ref Range    Iron 63 37 - 145 mcg/dL   Magnesium   Result Value Ref Range    Magnesium 2.2 1.6 - 2.6 mg/dL   Ferritin   Result Value Ref Range    Ferritin 20.60 13.00 - 150.00 ng/mL   Zinc   Result Value Ref Range    Zinc 77 56 - 134 ug/dL   Selenium Serum   Result Value Ref Range    Selenium 131 91 - 198 ug/L   Cortisol, Free   Result Value Ref Range    Cortisol, Free Dialysis, LCMS 0.165 ug/dL   weight is down and working on it      Last chol score was 3.3%    mIU/mL   DR Schmid took right side thyroid out     Sees GYN and had hyst  She sees pain management for  neck pain  Sees [facility] for psych  Saw ortho  She is on suppressive Rx HSV  Last lipid score Feb 3.1%  I see iron level was in range    Lab Results   Component Value Date    FERRITIN 20.60 04/30/2019     Iron was in range; hgb is stable  On MVI  I need f/u on her iron in 8 weeks  DR Schmid does thyroid Rx  Recent depression and will see psych tomorrow  The following portions of the patient's history were reviewed and updated as appropriate: allergies, current medications, past family history, past medical history, past social history, past surgical history and problem list.    Review of Systems   Constitutional: Positive for fatigue. Negative for activity change, appetite change and unexpected weight change.   HENT: Negative for nosebleeds and trouble swallowing.    Eyes: Negative for pain and visual disturbance.   Respiratory: Negative for chest tightness, shortness of breath and wheezing.    Cardiovascular: Negative for chest pain and palpitations.   Gastrointestinal: Negative for abdominal pain and blood in stool.   Endocrine: Negative.    Genitourinary: Negative for difficulty urinating and hematuria.   Musculoskeletal: Positive for neck pain. Negative for joint swelling.   Skin: Negative for color change and rash.   Allergic/Immunologic: Negative.    Neurological: Negative for syncope and speech difficulty.   Hematological: Negative for adenopathy.   Psychiatric/Behavioral: Positive for dysphoric mood. Negative for agitation and confusion.   All other systems reviewed and are negative.      Objective   Physical Exam   Constitutional: She is oriented to person, place, and time. She appears well-developed and well-nourished. No distress.   HENT:   Head: Normocephalic and atraumatic.   Eyes: Conjunctivae and EOM are normal. Pupils are equal, round, and reactive to light. Right eye exhibits no discharge. Left eye exhibits no discharge. No scleral icterus.   Neck: Normal range of motion. Neck supple. No tracheal  deviation present. No thyromegaly present.   Cardiovascular: Normal rate, regular rhythm, normal heart sounds, intact distal pulses and normal pulses. Exam reveals no gallop.   No murmur heard.  Pulmonary/Chest: Effort normal and breath sounds normal. No respiratory distress. She has no wheezes. She has no rales.   Musculoskeletal: Normal range of motion.   Neurological: She is alert and oriented to person, place, and time. She exhibits normal muscle tone. Coordination normal.   Skin: Skin is warm. No rash noted. No erythema. No pallor.   Psychiatric: She has a normal mood and affect. Her behavior is normal. Judgment and thought content normal.   Nursing note and vitals reviewed.      Assessment/Plan   Arlene was seen today for hypertension and med management.    Diagnoses and all orders for this visit:    Hypertension, essential, benign    Low hemoglobin  -     CBC & Differential  -     Iron Profile  -     Ferritin    Gastroesophageal reflux disease without esophagitis    Mixed hyperlipidemia    Recurrent HSV (herpes simplex virus)    Other orders  -     valACYclovir (VALTREX) 1000 MG tablet; Take 1 tablet by mouth Daily.  -     amLODIPine-benazepril (LOTREL 5-10) 5-10 MG per capsule; Take 1 capsule by mouth Daily. For BP  -     terconazole (TERAZOL 3) 0.8 % vaginal cream; Insert 1 applicator into the vagina Every Night. (put on file)      Sees psych  I do want CBC with iron and ferritin in 8 weeks--just following this for now; sees Dr Haro for GI already  No periods  See pain management  Has neurosurgeon appt  Sees DR Graves for glaucoma    In summary, Arlene Quesada, was seen today.  she was seen for  Hypertenision and is well controlled on medication, GERD and is well controlled on PPI medication, Hyperlipidemia and working on this with diet and exercise, Hypothyroidism and under the care of Endocrinologist, Seasonal allergies and is doing well on their medication PRN and Vitamin D deficiency and well controlled  on medication and labs at goal >30,

## 2019-05-21 NOTE — PATIENT INSTRUCTIONS
Low glycemic index diet  Exercise 30 minutes most days of the week  Make sure you get results on any labs or tests we ordered today  We discussed medications and how to take them as prescribed  Sleep 6-8 hours each night if possible  If you have not signed up for [employer], please activate your code ASAP from your After Visit Summary today    LDL goal <100  LDL goal if heart disease <70  HDL goal >60  Triglyceride goal <150  BP goal =<130/80  Fasting glucose <100

## 2019-05-24 ENCOUNTER — TELEPHONE (OUTPATIENT)
Dept: NEUROSURGERY | Facility: CLINIC | Age: 59
End: 2019-05-24

## 2019-05-24 NOTE — PROGRESS NOTES
Subjective   Patient ID: Arlene Quesada is a 59 y.o. female is being seen for consultation today at the request of Sofie Salter MD for neck and back pain. She has numbness and tingling in bilateral hands. She has had physical therapy, TOAMSA and facet injections.     History of Present Illness     Patient has been having pain in her neck with radiation into both of her arms and numbness and tingling in both of her hands.  She also has pain in her lower back.  She has no radiation into her legs however.  She also has pain in between her shoulder blades.  She has been treated with physical therapy epidural blocks and facet injections but nothing is helped long-term.  She has no difficulty with bowel and bladder control or other associated symptoms.  Activity makes the pain worse.    The following portions of the patient's history were reviewed and updated as appropriate: allergies, current medications, past family history, past medical history, past social history, past surgical history and problem list.    Review of Systems   Constitutional: Positive for activity change.   HENT: Positive for sinus pressure.    Respiratory: Negative for chest tightness and shortness of breath.    Cardiovascular: Negative for chest pain.   Musculoskeletal: Positive for back pain, myalgias, neck pain and neck stiffness.   Neurological: Positive for numbness.        Positive for tingling   All other systems reviewed and are negative.      Objective   Physical Exam   Constitutional: She is oriented to person, place, and time. She appears well-developed and well-nourished.   HENT:   Head: Normocephalic and atraumatic.   Eyes: Conjunctivae and EOM are normal. Pupils are equal, round, and reactive to light.   Fundoscopic exam:       The right eye shows no papilledema. The right eye shows venous pulsations.        The left eye shows no papilledema. The left eye shows venous pulsations.   Neck: Carotid bruit is not present.   Neurological:  She is oriented to person, place, and time. She has a normal Finger-Nose-Finger Test and a normal Heel to Shin Test. Gait normal.   Reflex Scores:       Tricep reflexes are 2+ on the right side and 2+ on the left side.       Bicep reflexes are 2+ on the right side and 2+ on the left side.       Brachioradialis reflexes are 2+ on the right side and 2+ on the left side.       Patellar reflexes are 2+ on the right side and 2+ on the left side.       Achilles reflexes are 2+ on the right side and 2+ on the left side.  Psychiatric: Her speech is normal.     Neurologic Exam     Mental Status   Oriented to person, place, and time.   Registration of memory: Good recent and remote memory.   Attention: normal. Concentration: normal.   Speech: speech is normal   Level of consciousness: alert  Knowledge: consistent with education.     Cranial Nerves     CN II   Visual fields full to confrontation.   Visual acuity: normal    CN III, IV, VI   Pupils are equal, round, and reactive to light.  Extraocular motions are normal.     CN V   Facial sensation intact.   Right corneal reflex: normal  Left corneal reflex: normal    CN VII   Facial expression full, symmetric.   Right facial weakness: none  Left facial weakness: none    CN VIII   Hearing: intact    CN IX, X   Palate: symmetric    CN XI   Right sternocleidomastoid strength: normal  Left sternocleidomastoid strength: normal    CN XII   Tongue: not atrophic  Tongue deviation: none    Motor Exam   Muscle bulk: normal  Right arm tone: normal  Left arm tone: normal  Right leg tone: normal  Left leg tone: normal    Strength   Strength 5/5 except as noted.     Sensory Exam   Light touch normal.     Gait, Coordination, and Reflexes     Gait  Gait: normal    Coordination   Finger to nose coordination: normal  Heel to shin coordination: normal    Reflexes   Right brachioradialis: 2+  Left brachioradialis: 2+  Right biceps: 2+  Left biceps: 2+  Right triceps: 2+  Left triceps: 2+  Right  patellar: 2+  Left patellar: 2+  Right achilles: 2+  Left achilles: 2+  Right : 2+  Left : 2+      Assessment/Plan   Independent Review of Radiographic Studies:      I reviewed an MRI of her cervical spine done in February 2018.  This shows a widely patent canal and foramina at C2-3.  C3-4 shows some midline disc bulging with some compression of the thecal sac.  C4-5 also shows some midline disc bulging.  C4-5 shows some central stenosis due to disc bulging and C6-7 shows some left-sided foraminal stenosis.  C7-T1 looks okay.    I also reviewed an MRI of her lumbar spine from March 2011.  This shows some disc bulging at L4-5 but no evidence of any significant pressure on the neural elements at any level.    Medical Decision Making:      I told the patient about the imaging.  I told her that we could certainly go ahead with a total spine myelogram.  I told the patient what a myelogram involves.  I explained that there is a 50% chance of developing a bad headache and nausea as a result of the test.  I explained that there is also a very small chance of infection, seizures, and bleeding.  I explained how we would treat a post myelogram headache including bedrest, caffeinated fluids, steroids, and blood patch.  The patient does ask to proceed.    Arlene was seen today for neck pain.    Diagnoses and all orders for this visit:    Cervical spondylosis without myelopathy  -     Obtain Informed Consent; Standing  -     IR Myelogram 2 or More Areas; Future  -     CT Cervical Spine With Contrast; Future  -     XR Spine Cervical Complete With Flex Ext; Future  -     No Lab Testing Needed; Standing  -     dexamethasone (DECADRON) 4 MG tablet; Take 2 tablets by mouth Take As Directed. Take both tablets by mouth 2 hours before myelogram    Chronic bilateral low back pain without sciatica  -     Obtain Informed Consent; Standing  -     IR Myelogram 2 or More Areas; Future  -     CT Thoracic Spine With Contrast; Future  -      CT Lumbar Spine With Contrast; Future  -     XR Spine Lumbar Complete With Flex & Ext; Future  -     No Lab Testing Needed; Standing  -     dexamethasone (DECADRON) 4 MG tablet; Take 2 tablets by mouth Take As Directed. Take both tablets by mouth 2 hours before myelogram      Return for After radiology test.

## 2019-05-24 NOTE — TELEPHONE ENCOUNTER
Called the patient to check the status of their new patient packet, as we have not received it and we need it back 3 business days prior to their appointment or their appointment will be canceled. Had to leave a voicemail to call the office. LVM we need her packet back by 4:00 pm today or her appointment will be cancelled.

## 2019-05-28 ENCOUNTER — OFFICE VISIT (OUTPATIENT)
Dept: NEUROSURGERY | Facility: CLINIC | Age: 59
End: 2019-05-28

## 2019-05-28 VITALS
WEIGHT: 171 LBS | SYSTOLIC BLOOD PRESSURE: 102 MMHG | DIASTOLIC BLOOD PRESSURE: 68 MMHG | BODY MASS INDEX: 30.3 KG/M2 | HEART RATE: 77 BPM | HEIGHT: 63 IN

## 2019-05-28 DIAGNOSIS — M47.812 CERVICAL SPONDYLOSIS WITHOUT MYELOPATHY: Primary | ICD-10-CM

## 2019-05-28 DIAGNOSIS — M54.50 CHRONIC BILATERAL LOW BACK PAIN WITHOUT SCIATICA: ICD-10-CM

## 2019-05-28 DIAGNOSIS — G89.29 CHRONIC BILATERAL LOW BACK PAIN WITHOUT SCIATICA: ICD-10-CM

## 2019-05-28 PROCEDURE — 99244 OFF/OP CNSLTJ NEW/EST MOD 40: CPT | Performed by: NEUROLOGICAL SURGERY

## 2019-05-28 RX ORDER — DEXAMETHASONE 4 MG/1
8 TABLET ORAL TAKE AS DIRECTED
Qty: 2 TABLET | Refills: 0 | Status: SHIPPED | OUTPATIENT
Start: 2019-05-28 | End: 2021-01-05 | Stop reason: SDDI

## 2019-06-04 ENCOUNTER — TRANSCRIBE ORDERS (OUTPATIENT)
Dept: LAB | Facility: HOSPITAL | Age: 59
End: 2019-06-04

## 2019-06-04 ENCOUNTER — LAB (OUTPATIENT)
Dept: LAB | Facility: HOSPITAL | Age: 59
End: 2019-06-04

## 2019-06-04 DIAGNOSIS — E89.0 POSTSURGICAL HYPOTHYROIDISM: Primary | ICD-10-CM

## 2019-06-04 DIAGNOSIS — E89.0 POSTSURGICAL HYPOTHYROIDISM: ICD-10-CM

## 2019-06-04 DIAGNOSIS — E04.1 THYROID NODULE: ICD-10-CM

## 2019-06-04 LAB
T3FREE SERPL-MCNC: 2.96 PG/ML (ref 2–4.4)
T4 FREE SERPL-MCNC: 0.75 NG/DL (ref 0.93–1.7)
TSH SERPL DL<=0.05 MIU/L-ACNC: 2.17 MIU/ML (ref 0.27–4.2)

## 2019-06-04 PROCEDURE — 84439 ASSAY OF FREE THYROXINE: CPT

## 2019-06-04 PROCEDURE — 36415 COLL VENOUS BLD VENIPUNCTURE: CPT

## 2019-06-04 PROCEDURE — 84481 FREE ASSAY (FT-3): CPT

## 2019-06-04 PROCEDURE — 84443 ASSAY THYROID STIM HORMONE: CPT

## 2019-06-21 ENCOUNTER — TELEPHONE (OUTPATIENT)
Dept: NEUROSURGERY | Facility: CLINIC | Age: 59
End: 2019-06-21

## 2019-07-19 ENCOUNTER — TELEPHONE (OUTPATIENT)
Dept: NEUROSURGERY | Facility: CLINIC | Age: 59
End: 2019-07-19

## 2019-07-19 NOTE — TELEPHONE ENCOUNTER
MCKAYLA to call the office she was last seen 5/28/2019 and Dr. Dominguez ordered a Myelogram. We have been trying to reach her to see if she still wants to have it. If so she will need an appointment as it has been over 30 days since she was last seen. She must be seen within 30 days of the procedure.

## 2019-08-27 ENCOUNTER — TELEPHONE (OUTPATIENT)
Dept: NEUROSURGERY | Facility: CLINIC | Age: 59
End: 2019-08-27

## 2019-08-27 ENCOUNTER — OFFICE VISIT (OUTPATIENT)
Dept: NEUROSURGERY | Facility: CLINIC | Age: 59
End: 2019-08-27

## 2019-08-27 VITALS — HEART RATE: 75 BPM | DIASTOLIC BLOOD PRESSURE: 79 MMHG | SYSTOLIC BLOOD PRESSURE: 125 MMHG

## 2019-08-27 DIAGNOSIS — G89.29 CHRONIC BILATERAL LOW BACK PAIN WITHOUT SCIATICA: ICD-10-CM

## 2019-08-27 DIAGNOSIS — M48.02 CERVICAL SPINAL STENOSIS: Primary | ICD-10-CM

## 2019-08-27 DIAGNOSIS — M54.50 CHRONIC BILATERAL LOW BACK PAIN WITHOUT SCIATICA: ICD-10-CM

## 2019-08-27 PROCEDURE — 99213 OFFICE O/P EST LOW 20 MIN: CPT | Performed by: NEUROLOGICAL SURGERY

## 2019-08-27 NOTE — PROGRESS NOTES
Subjective   Patient ID: Arlene Quesada is a 59 y.o. female is here today for follow-up. She was last seen 5/28/2019 for neck and back pain with numbness & tingling in bilateral upper extremity's.     History of Present Illness    This patient returns today.  She continues with pain in her neck with radiation into both of her arms as well as numbness and tingling in her hands.  She also has pain in her lower back and some pain between her shoulder blades.  She has been treated with multiple injections over time.  The pain is no worse than when she was here in May but no better either.    The following portions of the patient's history were reviewed and updated as appropriate: allergies, current medications, past family history, past medical history, past social history, past surgical history and problem list.    Review of Systems   Respiratory: Negative for chest tightness and shortness of breath.    Cardiovascular: Negative for chest pain.   Musculoskeletal: Positive for back pain, neck pain and neck stiffness.   Neurological: Positive for numbness.        Positive for tingling   All other systems reviewed and are negative.      Objective   Physical Exam   Constitutional: She is oriented to person, place, and time. She appears well-developed and well-nourished.   Eyes: EOM are normal. Pupils are equal, round, and reactive to light.   Neurological: She is oriented to person, place, and time. She has a normal Finger-Nose-Finger Test and a normal Heel to Shin Test. Gait normal.   Reflex Scores:       Tricep reflexes are 2+ on the right side and 2+ on the left side.       Bicep reflexes are 2+ on the right side and 2+ on the left side.       Brachioradialis reflexes are 2+ on the right side and 2+ on the left side.       Patellar reflexes are 2+ on the right side and 2+ on the left side.       Achilles reflexes are 2+ on the right side and 2+ on the left side.  Psychiatric: Her speech is normal.     Neurologic Exam      Mental Status   Oriented to person, place, and time.   Registration of memory: Good recent and remote memory.   Attention: normal. Concentration: normal.   Speech: speech is normal   Level of consciousness: alert  Knowledge: consistent with education.     Cranial Nerves     CN II   Visual fields full to confrontation.   Visual acuity: normal    CN III, IV, VI   Pupils are equal, round, and reactive to light.  Extraocular motions are normal.     CN V   Facial sensation intact.   Right corneal reflex: normal  Left corneal reflex: normal    CN VII   Facial expression full, symmetric.   Right facial weakness: none  Left facial weakness: none    CN VIII   Hearing: intact    CN IX, X   Palate: symmetric    CN XI   Right sternocleidomastoid strength: normal  Left sternocleidomastoid strength: normal    CN XII   Tongue: not atrophic  Tongue deviation: none    Motor Exam   Muscle bulk: normal  Right arm tone: normal  Left arm tone: normal  Right leg tone: normal  Left leg tone: normal    Strength   Strength 5/5 except as noted.     Sensory Exam   Light touch normal.     Gait, Coordination, and Reflexes     Gait  Gait: normal    Coordination   Finger to nose coordination: normal  Heel to shin coordination: normal    Reflexes   Right brachioradialis: 2+  Left brachioradialis: 2+  Right biceps: 2+  Left biceps: 2+  Right triceps: 2+  Left triceps: 2+  Right patellar: 2+  Left patellar: 2+  Right achilles: 2+  Left achilles: 2+  Right : 2+  Left : 2+      Assessment/Plan   Independent Review of Radiographic Studies:      I reviewed MRIs of her cervical and lumbar spine which were done in the past.  There is some central stenosis at C4-5 in the cervical spine and some left-sided foraminal stenosis at C6-7.  There is a little disc bulging at L4-5 in the lower back but nothing severe.    Medical Decision Making:      I told the patient that we should go ahead with the total spine myelogram we had talked about in May.  I  told the patient what a myelogram involves.  I explained that there is a 50% chance of developing a bad headache and nausea as a result of the test.  I explained that there is also a very small chance of infection, seizures, and bleeding.  I explained how we would treat a post myelogram headache including bedrest, caffeinated fluids, steroids, and blood patch.  The patient does ask to proceed.    Arlene was seen today for neck pain and back pain.    Diagnoses and all orders for this visit:    Cervical spinal stenosis    Chronic bilateral low back pain without sciatica      Return for After radiology test.

## 2019-09-24 ENCOUNTER — TRANSCRIBE ORDERS (OUTPATIENT)
Dept: ADMINISTRATIVE | Facility: HOSPITAL | Age: 59
End: 2019-09-24

## 2019-09-24 ENCOUNTER — LAB (OUTPATIENT)
Dept: LAB | Facility: HOSPITAL | Age: 59
End: 2019-09-24

## 2019-09-24 DIAGNOSIS — E03.9 HYPOTHYROIDISM, ADULT: Primary | ICD-10-CM

## 2019-09-24 DIAGNOSIS — E03.9 HYPOTHYROIDISM, ADULT: ICD-10-CM

## 2019-09-24 LAB
T3FREE SERPL-MCNC: 2.85 PG/ML (ref 2–4.4)
T4 FREE SERPL-MCNC: 0.71 NG/DL (ref 0.93–1.7)
TSH SERPL DL<=0.05 MIU/L-ACNC: 1.43 UIU/ML (ref 0.27–4.2)

## 2019-09-24 PROCEDURE — 84443 ASSAY THYROID STIM HORMONE: CPT

## 2019-09-24 PROCEDURE — 36415 COLL VENOUS BLD VENIPUNCTURE: CPT

## 2019-09-24 PROCEDURE — 84481 FREE ASSAY (FT-3): CPT

## 2019-09-24 PROCEDURE — 84439 ASSAY OF FREE THYROXINE: CPT | Performed by: OTOLARYNGOLOGY

## 2019-12-17 RX ORDER — AMLODIPINE BESYLATE AND BENAZEPRIL HYDROCHLORIDE 5; 10 MG/1; MG/1
CAPSULE ORAL
Qty: 30 CAPSULE | Refills: 0 | Status: SHIPPED | OUTPATIENT
Start: 2019-12-17 | End: 2020-01-03 | Stop reason: SDUPTHER

## 2020-01-03 ENCOUNTER — OFFICE VISIT (OUTPATIENT)
Dept: FAMILY MEDICINE CLINIC | Facility: CLINIC | Age: 60
End: 2020-01-03

## 2020-01-03 VITALS
TEMPERATURE: 98.2 F | OXYGEN SATURATION: 98 % | RESPIRATION RATE: 16 BRPM | DIASTOLIC BLOOD PRESSURE: 72 MMHG | SYSTOLIC BLOOD PRESSURE: 110 MMHG | WEIGHT: 164 LBS | HEART RATE: 78 BPM | BODY MASS INDEX: 29.06 KG/M2 | HEIGHT: 63 IN

## 2020-01-03 DIAGNOSIS — E55.9 VITAMIN D DEFICIENCY: ICD-10-CM

## 2020-01-03 DIAGNOSIS — J30.9 CHRONIC ALLERGIC RHINITIS: ICD-10-CM

## 2020-01-03 DIAGNOSIS — R73.01 IMPAIRED FASTING GLUCOSE: ICD-10-CM

## 2020-01-03 DIAGNOSIS — I10 HYPERTENSION, ESSENTIAL, BENIGN: Primary | ICD-10-CM

## 2020-01-03 DIAGNOSIS — J06.9 ACUTE URI: ICD-10-CM

## 2020-01-03 DIAGNOSIS — E78.2 MIXED HYPERLIPIDEMIA: ICD-10-CM

## 2020-01-03 DIAGNOSIS — I10 ESSENTIAL HYPERTENSION: ICD-10-CM

## 2020-01-03 DIAGNOSIS — K21.9 GASTROESOPHAGEAL REFLUX DISEASE WITHOUT ESOPHAGITIS: ICD-10-CM

## 2020-01-03 PROBLEM — R94.31 ABNORMAL ECG: Status: ACTIVE | Noted: 2020-01-03

## 2020-01-03 PROBLEM — F41.9 ANXIETY DISORDER: Status: ACTIVE | Noted: 2020-01-03

## 2020-01-03 PROCEDURE — 99214 OFFICE O/P EST MOD 30 MIN: CPT | Performed by: PHYSICIAN ASSISTANT

## 2020-01-03 RX ORDER — LEVOFLOXACIN 500 MG/1
TABLET, FILM COATED ORAL
Qty: 10 TABLET | Refills: 0 | Status: SHIPPED | OUTPATIENT
Start: 2020-01-03 | End: 2021-01-05 | Stop reason: SDUPTHER

## 2020-01-03 RX ORDER — FLUOXETINE HYDROCHLORIDE 40 MG/1
CAPSULE ORAL 2 TIMES DAILY
COMMUNITY
Start: 2020-01-02 | End: 2020-07-07 | Stop reason: SDUPTHER

## 2020-01-03 RX ORDER — FLUCONAZOLE 150 MG/1
150 TABLET ORAL ONCE
Qty: 1 TABLET | Refills: 5 | Status: SHIPPED | OUTPATIENT
Start: 2020-01-03 | End: 2020-01-03

## 2020-01-03 RX ORDER — NITROFURANTOIN 25; 75 MG/1; MG/1
100 CAPSULE ORAL DAILY
Qty: 30 CAPSULE | Refills: 5 | Status: SHIPPED | OUTPATIENT
Start: 2020-01-03 | End: 2020-07-07 | Stop reason: SDUPTHER

## 2020-01-03 RX ORDER — AMLODIPINE BESYLATE AND BENAZEPRIL HYDROCHLORIDE 5; 10 MG/1; MG/1
1 CAPSULE ORAL DAILY
Qty: 30 CAPSULE | Refills: 5 | Status: SHIPPED | OUTPATIENT
Start: 2020-01-03 | End: 2020-07-07 | Stop reason: SDUPTHER

## 2020-01-03 RX ORDER — MONTELUKAST SODIUM 10 MG/1
10 TABLET ORAL NIGHTLY
Qty: 30 TABLET | Refills: 11 | Status: SHIPPED | OUTPATIENT
Start: 2020-01-03 | End: 2020-07-07 | Stop reason: SDUPTHER

## 2020-01-03 NOTE — PROGRESS NOTES
"Subjective   Arlene Quesada is a 59 y.o. female.     History of Present Illness    Since the last visit, she has overall felt well until recent illness.  She has Essential Hypertension and well controlled on current medication, GERD controlled on PPI Rx, Hyperlipidemia and working on this with diet and exercise, Hypothyroidism and remains under the care of their Endocrinologist, Seasonal allergies and doing well on their medication  and Vitamin D deficiency and will update labs for continued management.  she has been compliant with current medications have reviewed them.  The patient denies medication side effects.  Will refill medications. /72 (BP Location: Left arm, Patient Position: Sitting, Cuff Size: Large Adult)   Pulse 78   Temp 98.2 °F (36.8 °C) (Oral)   Resp 16   Ht 160 cm (63\")   Wt 74.4 kg (164 lb)   LMP  (LMP Unknown)   SpO2 98%   BMI 29.05 kg/m²     Results for orders placed or performed in visit on 09/24/19   TSH   Result Value Ref Range    TSH 1.430 0.270 - 4.200 uIU/mL   T3, Free   Result Value Ref Range    T3, Free 2.85 2.00 - 4.40 pg/mL   DR Schmid took right side thyroid out--gets her labs   She is seeing DR Cook for psych; on Prozac  She is seeing Dr. her Plascencia for GYN and is on compounded hormone replacement therapy  I can see she went to urgent care on Tuesday and they gave her a Medrol Dosepak for URI.  She still congested with ear pressure sinus pressure sore throat congestion cough.  I am going to put Levaquin on file for if she gets a sinus infection.  She has 2 more days of the Medrol Dosepak tonight I do want to caution her with using a quinolone with that.  I am going to refill her Macrobid that she does prophylactically for prevention of UTI with coitus  She is on Valtrex as needed fever blisters and works well  Last cholesterol score was 3.3%.  I also want a follow-up her ALT on last labs was 39.  We will add Singulair for allergies and see if helps  She has failed " H2 blockers  The following portions of the patient's history were reviewed and updated as appropriate: allergies, current medications, past family history, past medical history, past social history, past surgical history and problem list.    Review of Systems   Constitutional: Positive for fatigue. Negative for activity change and unexpected weight change.   HENT: Positive for congestion, postnasal drip, sinus pressure and sore throat. Negative for ear pain.    Eyes: Negative for pain and discharge.   Respiratory: Positive for cough. Negative for chest tightness, shortness of breath and wheezing.    Cardiovascular: Negative for chest pain and palpitations.   Gastrointestinal: Negative for abdominal pain, diarrhea and vomiting.   Endocrine: Negative.    Genitourinary: Negative.    Musculoskeletal: Negative for joint swelling.   Skin: Negative for color change, rash and wound.   Allergic/Immunologic: Negative.    Neurological: Negative for seizures and syncope.   Psychiatric/Behavioral: Negative.        Objective   Physical Exam   Constitutional: She is oriented to person, place, and time. She appears well-developed and well-nourished.  Non-toxic appearance. No distress.   HENT:   Head: Normocephalic and atraumatic. Hair is normal.   Right Ear: External ear normal. No drainage, swelling or tenderness. Tympanic membrane is retracted.   Left Ear: External ear normal. No drainage, swelling or tenderness. Tympanic membrane is retracted.   Nose: Mucosal edema present. No epistaxis.   Mouth/Throat: Uvula is midline and mucous membranes are normal. No oral lesions. No uvula swelling. Posterior oropharyngeal erythema present. No oropharyngeal exudate.   Eyes: Pupils are equal, round, and reactive to light. Conjunctivae and EOM are normal. Right eye exhibits no discharge. Left eye exhibits no discharge. No scleral icterus.   Neck: Normal range of motion. Neck supple.   Cardiovascular: Normal rate, regular rhythm and normal  heart sounds. Exam reveals no gallop.   No murmur heard.  Pulmonary/Chest: Breath sounds normal. No stridor. No respiratory distress. She has no wheezes. She has no rales. She exhibits no tenderness.   Abdominal: Soft. There is no tenderness.   Lymphadenopathy:     She has cervical adenopathy.   Neurological: She is alert and oriented to person, place, and time. She exhibits normal muscle tone.   Skin: Skin is warm and dry. No rash noted. She is not diaphoretic.   Psychiatric: She has a normal mood and affect. Her behavior is normal. Judgment and thought content normal.   Nursing note and vitals reviewed.      Assessment/Plan   Problems Addressed this Visit        Cardiovascular and Mediastinum    Hyperlipemia    Relevant Orders    Comprehensive metabolic panel    Lipid panel    Hemoglobin A1c    CBC & Differential    Vitamin B12    Folate    Vitamin D 25 Hydroxy    Hypertension, essential, benign - Primary    Relevant Medications    amLODIPine-benazepril (LOTREL 5-10) 5-10 MG per capsule    Other Relevant Orders    Comprehensive metabolic panel    Lipid panel    Hemoglobin A1c    CBC & Differential    Vitamin B12    Folate    Vitamin D 25 Hydroxy    BP (high blood pressure)    Relevant Medications    amLODIPine-benazepril (LOTREL 5-10) 5-10 MG per capsule    Other Relevant Orders    Comprehensive metabolic panel    Lipid panel    Hemoglobin A1c    CBC & Differential    Vitamin B12    Folate    Vitamin D 25 Hydroxy       Digestive    Gastroesophageal reflux    Relevant Orders    Comprehensive metabolic panel    Lipid panel    Hemoglobin A1c    CBC & Differential    Vitamin B12    Folate    Vitamin D 25 Hydroxy      Other Visit Diagnoses     Acute URI        Relevant Medications    nitrofurantoin, macrocrystal-monohydrate, (MACROBID) 100 MG capsule    levoFLOXacin (LEVAQUIN) 500 MG tablet    Other Relevant Orders    Comprehensive metabolic panel    Lipid panel    Hemoglobin A1c    CBC & Differential    Vitamin B12     Folate    Vitamin D 25 Hydroxy    Vitamin D deficiency        Relevant Orders    Comprehensive metabolic panel    Lipid panel    Hemoglobin A1c    CBC & Differential    Vitamin B12    Folate    Vitamin D 25 Hydroxy    Impaired fasting glucose        Relevant Orders    Comprehensive metabolic panel    Lipid panel    Hemoglobin A1c    CBC & Differential    Vitamin B12    Folate    Vitamin D 25 Hydroxy    Chronic allergic rhinitis          I did put her treatment for yeast infection on file  Dr. Schmid does her thyroid  Putting Levaquin on file and watching for sinus infection  Macrobid for prophylactic use  Plan, Arlene Quesada, was seen today.  she was seen for HTN and continue medication, Imparied fasting glucose and plan follow up labs, diet, and exercise, GERD and will continue on PPI medication, Hyperlipidemia and will work on this with diet and exercise, Hypothyroidism and under the care of Endocrinologist, Seasonal allergies and is doing well on their medication PRN and Vitamin D deficiency and will update labs .

## 2020-01-03 NOTE — PATIENT INSTRUCTIONS
"Hypertension  Hypertension, commonly called high blood pressure, is when the force of blood pumping through the arteries is too strong. The arteries are the blood vessels that carry blood from the heart throughout the body. Hypertension forces the heart to work harder to pump blood and may cause arteries to become narrow or stiff. Having untreated or uncontrolled hypertension can cause heart attacks, strokes, kidney disease, and other problems.  A blood pressure reading consists of a higher number over a lower number. Ideally, your blood pressure should be below 120/80. The first (\"top\") number is called the systolic pressure. It is a measure of the pressure in your arteries as your heart beats. The second (\"bottom\") number is called the diastolic pressure. It is a measure of the pressure in your arteries as the heart relaxes.  What are the causes?  The cause of this condition is not known.  What increases the risk?  Some risk factors for high blood pressure are under your control. Others are not.  Factors you can change  · Smoking.  · Having type 2 diabetes mellitus, high cholesterol, or both.  · Not getting enough exercise or physical activity.  · Being overweight.  · Having too much fat, sugar, calories, or salt (sodium) in your diet.  · Drinking too much alcohol.  Factors that are difficult or impossible to change  · Having chronic kidney disease.  · Having a family history of high blood pressure.  · Age. Risk increases with age.  · Race. You may be at higher risk if you are -American.  · Gender. Men are at higher risk than women before age 45. After age 65, women are at higher risk than men.  · Having obstructive sleep apnea.  · Stress.  What are the signs or symptoms?  Extremely high blood pressure (hypertensive crisis) may cause:  · Headache.  · Anxiety.  · Shortness of breath.  · Nosebleed.  · Nausea and vomiting.  · Severe chest pain.  · Jerky movements you cannot control (seizures).  How is this " diagnosed?  This condition is diagnosed by measuring your blood pressure while you are seated, with your arm resting on a surface. The cuff of the blood pressure monitor will be placed directly against the skin of your upper arm at the level of your heart. It should be measured at least twice using the same arm. Certain conditions can cause a difference in blood pressure between your right and left arms.  Certain factors can cause blood pressure readings to be lower or higher than normal (elevated) for a short period of time:  · When your blood pressure is higher when you are in a health care provider's office than when you are at home, this is called white coat hypertension. Most people with this condition do not need medicines.  · When your blood pressure is higher at home than when you are in a health care provider's office, this is called masked hypertension. Most people with this condition may need medicines to control blood pressure.  If you have a high blood pressure reading during one visit or you have normal blood pressure with other risk factors:  · You may be asked to return on a different day to have your blood pressure checked again.  · You may be asked to monitor your blood pressure at home for 1 week or longer.  If you are diagnosed with hypertension, you may have other blood or imaging tests to help your health care provider understand your overall risk for other conditions.  How is this treated?  This condition is treated by making healthy lifestyle changes, such as eating healthy foods, exercising more, and reducing your alcohol intake. Your health care provider may prescribe medicine if lifestyle changes are not enough to get your blood pressure under control, and if:  · Your systolic blood pressure is above 130.  · Your diastolic blood pressure is above 80.  Your personal target blood pressure may vary depending on your medical conditions, your age, and other factors.  Follow these instructions  at home:  Eating and drinking    · Eat a diet that is high in fiber and potassium, and low in sodium, added sugar, and fat. An example eating plan is called the DASH (Dietary Approaches to Stop Hypertension) diet. To eat this way:  ? Eat plenty of fresh fruits and vegetables. Try to fill half of your plate at each meal with fruits and vegetables.  ? Eat whole grains, such as whole wheat pasta, brown rice, or whole grain bread. Fill about one quarter of your plate with whole grains.  ? Eat or drink low-fat dairy products, such as skim milk or low-fat yogurt.  ? Avoid fatty cuts of meat, processed or cured meats, and poultry with skin. Fill about one quarter of your plate with lean proteins, such as fish, chicken without skin, beans, eggs, and tofu.  ? Avoid premade and processed foods. These tend to be higher in sodium, added sugar, and fat.  · Reduce your daily sodium intake. Most people with hypertension should eat less than 1,500 mg of sodium a day.  · Limit alcohol intake to no more than 1 drink a day for nonpregnant women and 2 drinks a day for men. One drink equals 12 oz of beer, 5 oz of wine, or 1½ oz of hard liquor.  Lifestyle    · Work with your health care provider to maintain a healthy body weight or to lose weight. Ask what an ideal weight is for you.  · Get at least 30 minutes of exercise that causes your heart to beat faster (aerobic exercise) most days of the week. Activities may include walking, swimming, or biking.  · Include exercise to strengthen your muscles (resistance exercise), such as pilates or lifting weights, as part of your weekly exercise routine. Try to do these types of exercises for 30 minutes at least 3 days a week.  · Do not use any products that contain nicotine or tobacco, such as cigarettes and e-cigarettes. If you need help quitting, ask your health care provider.  · Monitor your blood pressure at home as told by your health care provider.  · Keep all follow-up visits as told by  your health care provider. This is important.  Medicines  · Take over-the-counter and prescription medicines only as told by your health care provider. Follow directions carefully. Blood pressure medicines must be taken as prescribed.  · Do not skip doses of blood pressure medicine. Doing this puts you at risk for problems and can make the medicine less effective.  · Ask your health care provider about side effects or reactions to medicines that you should watch for.  Contact a health care provider if:  · You think you are having a reaction to a medicine you are taking.  · You have headaches that keep coming back (recurring).  · You feel dizzy.  · You have swelling in your ankles.  · You have trouble with your vision.  Get help right away if:  · You develop a severe headache or confusion.  · You have unusual weakness or numbness.  · You feel faint.  · You have severe pain in your chest or abdomen.  · You vomit repeatedly.  · You have trouble breathing.  Summary  · Hypertension is when the force of blood pumping through your arteries is too strong. If this condition is not controlled, it may put you at risk for serious complications.  · Your personal target blood pressure may vary depending on your medical conditions, your age, and other factors. For most people, a normal blood pressure is less than 120/80.  · Hypertension is treated with lifestyle changes, medicines, or a combination of both. Lifestyle changes include weight loss, eating a healthy, low-sodium diet, exercising more, and limiting alcohol.  This information is not intended to replace advice given to you by your health care provider. Make sure you discuss any questions you have with your health care provider.  Document Released: 12/18/2006 Document Revised: 11/15/2017 Document Reviewed: 11/15/2017  [employer] Interactive Patient Education © 2019 [employer]      Upper Respiratory Infection, Adult  An upper respiratory infection (URI) is a common viral  infection of the nose, throat, and upper air passages that lead to the lungs. The most common type of URI is the common cold. URIs usually get better on their own, without medical treatment.  What are the causes?  A URI is caused by a virus. You may catch a virus by:  · Breathing in droplets from an infected person's cough or sneeze.  · Touching something that has been exposed to the virus (contaminated) and then touching your mouth, nose, or eyes.  What increases the risk?  You are more likely to get a URI if:  · You are very young or very old.  · It is eve or winter.  · You have close contact with others, such as at a , school, or health care facility.  · You smoke.  · You have long-term (chronic) heart or lung disease.  · You have a weakened disease-fighting (immune) system.  · You have nasal allergies or asthma.  · You are experiencing a lot of stress.  · You work in an area that has poor air circulation.  · You have poor nutrition.  What are the signs or symptoms?  A URI usually involves some of the following symptoms:  · Runny or stuffy (congested) nose.  · Sneezing.  · Cough.  · Sore throat.  · Headache.  · Fatigue.  · Fever.  · Loss of appetite.  · Pain in your forehead, behind your eyes, and over your cheekbones (sinus pain).  · Muscle aches.  · Redness or irritation of the eyes.  · Pressure in the ears or face.  How is this diagnosed?  This condition may be diagnosed based on your medical history and symptoms, and a physical exam. Your health care provider may use a cotton swab to take a mucus sample from your nose (nasal swab). This sample can be tested to determine what virus is causing the illness.  How is this treated?  URIs usually get better on their own within 7-10 days. You can take steps at home to relieve your symptoms. Medicines cannot cure URIs, but your health care provider may recommend certain medicines to help relieve symptoms, such as:  · Over-the-counter cold medicines.  · Cough  suppressants. Coughing is a type of defense against infection that helps to clear the respiratory system, so take these medicines only as recommended by your health care provider.  · Fever-reducing medicines.  Follow these instructions at home:  Activity  · Rest as needed.  · If you have a fever, stay home from work or school until your fever is gone or until your health care provider says you are no longer contagious. Your health care provider may have you wear a face mask to prevent your infection from spreading.  Relieving symptoms  · Gargle with a salt-water mixture 3-4 times a day or as needed. To make a salt-water mixture, completely dissolve ½-1 tsp of salt in 1 cup of warm water.  · Use a cool-mist humidifier to add moisture to the air. This can help you breathe more easily.  Eating and drinking    · Drink enough fluid to keep your urine pale yellow.  · Eat soups and other clear broths.  General instructions    · Take over-the-counter and prescription medicines only as told by your health care provider. These include cold medicines, fever reducers, and cough suppressants.  · Do not use any products that contain nicotine or tobacco, such as cigarettes and e-cigarettes. If you need help quitting, ask your health care provider.  · Stay away from secondhand smoke.  · Stay up to date on all immunizations, including the yearly (annual) flu vaccine.  · Keep all follow-up visits as told by your health care provider. This is important.  How to prevent the spread of infection to others    · URIs can be passed from person to person (are contagious). To prevent the infection from spreading:  ? Wash your hands often with soap and water. If soap and water are not available, use hand .  ? Avoid touching your mouth, face, eyes, or nose.  ? Cough or sneeze into a tissue or your sleeve or elbow instead of into your hand or into the air.  Contact a health care provider if:  · You are getting worse instead of  better.  · You have a fever or chills.  · Your mucus is brown or red.  · You have yellow or brown discharge coming from your nose.  · You have pain in your face, especially when you bend forward.  · You have swollen neck glands.  · You have pain while swallowing.  · You have white areas in the back of your throat.  Get help right away if:  · You have shortness of breath that gets worse.  · You have severe or persistent:  ? Headache.  ? Ear pain.  ? Sinus pain.  ? Chest pain.  · You have chronic lung disease along with any of the following:  ? Wheezing.  ? Prolonged cough.  ? Coughing up blood.  ? A change in your usual mucus.  · You have a stiff neck.  · You have changes in your:  ? Vision.  ? Hearing.  ? Thinking.  ? Mood.  Summary  · An upper respiratory infection (URI) is a common infection of the nose, throat, and upper air passages that lead to the lungs.  · A URI is caused by a virus.  · URIs usually get better on their own within 7-10 days.  · Medicines cannot cure URIs, but your health care provider may recommend certain medicines to help relieve symptoms.  This information is not intended to replace advice given to you by your health care provider. Make sure you discuss any questions you have with your health care provider.  Document Released: 06/13/2002 Document Revised: 08/03/2018 Document Reviewed: 08/03/2018  [employer] Interactive Patient Education © 2019 [employer]

## 2020-06-30 ENCOUNTER — LAB (OUTPATIENT)
Dept: LAB | Facility: HOSPITAL | Age: 60
End: 2020-06-30

## 2020-06-30 ENCOUNTER — TRANSCRIBE ORDERS (OUTPATIENT)
Dept: ADMINISTRATIVE | Facility: HOSPITAL | Age: 60
End: 2020-06-30

## 2020-06-30 DIAGNOSIS — E89.0 POSTSURGICAL HYPOTHYROIDISM: ICD-10-CM

## 2020-06-30 DIAGNOSIS — E89.0 POSTSURGICAL HYPOTHYROIDISM: Primary | ICD-10-CM

## 2020-06-30 LAB
25(OH)D3 SERPL-MCNC: 75.6 NG/ML (ref 30–100)
ALBUMIN SERPL-MCNC: 4.5 G/DL (ref 3.5–5.2)
ALBUMIN/GLOB SERPL: 1.4 G/DL
ALP SERPL-CCNC: 79 U/L (ref 39–117)
ALT SERPL W P-5'-P-CCNC: 23 U/L (ref 1–33)
ANION GAP SERPL CALCULATED.3IONS-SCNC: 8.2 MMOL/L (ref 5–15)
AST SERPL-CCNC: 21 U/L (ref 1–32)
BASOPHILS # BLD AUTO: 0.04 10*3/MM3 (ref 0–0.2)
BASOPHILS NFR BLD AUTO: 0.4 % (ref 0–1.5)
BILIRUB SERPL-MCNC: 0.3 MG/DL (ref 0.2–1.2)
BUN SERPL-MCNC: 16 MG/DL (ref 6–20)
BUN/CREAT SERPL: 18.4 (ref 7–25)
CALCIUM SPEC-SCNC: 9.5 MG/DL (ref 8.6–10.5)
CHLORIDE SERPL-SCNC: 97 MMOL/L (ref 98–107)
CHOLEST SERPL-MCNC: 198 MG/DL (ref 0–200)
CO2 SERPL-SCNC: 26.8 MMOL/L (ref 22–29)
CREAT SERPL-MCNC: 0.87 MG/DL (ref 0.57–1)
DEPRECATED RDW RBC AUTO: 41.9 FL (ref 37–54)
EOSINOPHIL # BLD AUTO: 0.32 10*3/MM3 (ref 0–0.4)
EOSINOPHIL NFR BLD AUTO: 3.3 % (ref 0.3–6.2)
ERYTHROCYTE [DISTWIDTH] IN BLOOD BY AUTOMATED COUNT: 13 % (ref 12.3–15.4)
FOLATE SERPL-MCNC: 19.1 NG/ML (ref 4.78–24.2)
GFR SERPL CREATININE-BSD FRML MDRD: 67 ML/MIN/1.73
GLOBULIN UR ELPH-MCNC: 3.3 GM/DL
GLUCOSE SERPL-MCNC: 91 MG/DL (ref 65–99)
HBA1C MFR BLD: 5.5 % (ref 4.8–5.6)
HCT VFR BLD AUTO: 38.4 % (ref 34–46.6)
HDLC SERPL-MCNC: 49 MG/DL (ref 40–60)
HGB BLD-MCNC: 12.9 G/DL (ref 12–15.9)
IMM GRANULOCYTES # BLD AUTO: 0.06 10*3/MM3 (ref 0–0.05)
IMM GRANULOCYTES NFR BLD AUTO: 0.6 % (ref 0–0.5)
LDLC SERPL CALC-MCNC: 114 MG/DL (ref 0–100)
LDLC/HDLC SERPL: 2.32 {RATIO}
LYMPHOCYTES # BLD AUTO: 2.55 10*3/MM3 (ref 0.7–3.1)
LYMPHOCYTES NFR BLD AUTO: 26.5 % (ref 19.6–45.3)
MCH RBC QN AUTO: 29.9 PG (ref 26.6–33)
MCHC RBC AUTO-ENTMCNC: 33.6 G/DL (ref 31.5–35.7)
MCV RBC AUTO: 89.1 FL (ref 79–97)
MONOCYTES # BLD AUTO: 0.55 10*3/MM3 (ref 0.1–0.9)
MONOCYTES NFR BLD AUTO: 5.7 % (ref 5–12)
NEUTROPHILS NFR BLD AUTO: 6.12 10*3/MM3 (ref 1.7–7)
NEUTROPHILS NFR BLD AUTO: 63.5 % (ref 42.7–76)
NRBC BLD AUTO-RTO: 0 /100 WBC (ref 0–0.2)
PLATELET # BLD AUTO: 421 10*3/MM3 (ref 140–450)
PMV BLD AUTO: 9.5 FL (ref 6–12)
POTASSIUM SERPL-SCNC: 3.6 MMOL/L (ref 3.5–5.2)
PROT SERPL-MCNC: 7.8 G/DL (ref 6–8.5)
RBC # BLD AUTO: 4.31 10*6/MM3 (ref 3.77–5.28)
SODIUM SERPL-SCNC: 132 MMOL/L (ref 136–145)
T3FREE SERPL-MCNC: 2.74 PG/ML (ref 2–4.4)
T4 FREE SERPL-MCNC: 0.74 NG/DL (ref 0.93–1.7)
TRIGL SERPL-MCNC: 176 MG/DL (ref 0–150)
TSH SERPL DL<=0.05 MIU/L-ACNC: 3.9 UIU/ML (ref 0.27–4.2)
VIT B12 BLD-MCNC: 951 PG/ML (ref 211–946)
VLDLC SERPL-MCNC: 35.2 MG/DL (ref 5–40)
WBC # BLD AUTO: 9.64 10*3/MM3 (ref 3.4–10.8)

## 2020-06-30 PROCEDURE — 80053 COMPREHEN METABOLIC PANEL: CPT | Performed by: PHYSICIAN ASSISTANT

## 2020-06-30 PROCEDURE — 80061 LIPID PANEL: CPT | Performed by: PHYSICIAN ASSISTANT

## 2020-06-30 PROCEDURE — 84481 FREE ASSAY (FT-3): CPT

## 2020-06-30 PROCEDURE — 82607 VITAMIN B-12: CPT | Performed by: PHYSICIAN ASSISTANT

## 2020-06-30 PROCEDURE — 85025 COMPLETE CBC W/AUTO DIFF WBC: CPT | Performed by: PHYSICIAN ASSISTANT

## 2020-06-30 PROCEDURE — 83036 HEMOGLOBIN GLYCOSYLATED A1C: CPT | Performed by: PHYSICIAN ASSISTANT

## 2020-06-30 PROCEDURE — 36415 COLL VENOUS BLD VENIPUNCTURE: CPT | Performed by: PHYSICIAN ASSISTANT

## 2020-06-30 PROCEDURE — 84439 ASSAY OF FREE THYROXINE: CPT | Performed by: PHYSICIAN ASSISTANT

## 2020-06-30 PROCEDURE — 82746 ASSAY OF FOLIC ACID SERUM: CPT | Performed by: PHYSICIAN ASSISTANT

## 2020-06-30 PROCEDURE — 82306 VITAMIN D 25 HYDROXY: CPT | Performed by: PHYSICIAN ASSISTANT

## 2020-06-30 PROCEDURE — 84443 ASSAY THYROID STIM HORMONE: CPT

## 2020-07-07 ENCOUNTER — OFFICE VISIT (OUTPATIENT)
Dept: FAMILY MEDICINE CLINIC | Facility: CLINIC | Age: 60
End: 2020-07-07

## 2020-07-07 VITALS
HEART RATE: 78 BPM | SYSTOLIC BLOOD PRESSURE: 122 MMHG | BODY MASS INDEX: 31.18 KG/M2 | TEMPERATURE: 98 F | DIASTOLIC BLOOD PRESSURE: 78 MMHG | WEIGHT: 176 LBS | RESPIRATION RATE: 16 BRPM | HEIGHT: 63 IN | OXYGEN SATURATION: 98 %

## 2020-07-07 DIAGNOSIS — F41.1 GENERALIZED ANXIETY DISORDER: ICD-10-CM

## 2020-07-07 DIAGNOSIS — R73.01 IMPAIRED FASTING GLUCOSE: ICD-10-CM

## 2020-07-07 DIAGNOSIS — E78.2 MIXED HYPERLIPIDEMIA: ICD-10-CM

## 2020-07-07 DIAGNOSIS — I10 ESSENTIAL HYPERTENSION: Primary | ICD-10-CM

## 2020-07-07 DIAGNOSIS — K21.9 GASTROESOPHAGEAL REFLUX DISEASE WITHOUT ESOPHAGITIS: ICD-10-CM

## 2020-07-07 DIAGNOSIS — E55.9 VITAMIN D DEFICIENCY: ICD-10-CM

## 2020-07-07 DIAGNOSIS — I10 HYPERTENSION, ESSENTIAL, BENIGN: ICD-10-CM

## 2020-07-07 PROCEDURE — 99214 OFFICE O/P EST MOD 30 MIN: CPT | Performed by: PHYSICIAN ASSISTANT

## 2020-07-07 RX ORDER — VALACYCLOVIR HYDROCHLORIDE 1 G/1
1000 TABLET, FILM COATED ORAL DAILY
Qty: 30 TABLET | Refills: 11 | Status: SHIPPED | OUTPATIENT
Start: 2020-07-07 | End: 2021-01-05

## 2020-07-07 RX ORDER — NITROFURANTOIN 25; 75 MG/1; MG/1
100 CAPSULE ORAL DAILY
Qty: 30 CAPSULE | Refills: 5 | Status: SHIPPED | OUTPATIENT
Start: 2020-07-07 | End: 2021-01-05 | Stop reason: SDUPTHER

## 2020-07-07 RX ORDER — FLUOXETINE HYDROCHLORIDE 40 MG/1
80 CAPSULE ORAL DAILY
Qty: 60 CAPSULE | Refills: 11 | Status: SHIPPED | OUTPATIENT
Start: 2020-07-07 | End: 2021-01-05 | Stop reason: SDUPTHER

## 2020-07-07 RX ORDER — AMLODIPINE BESYLATE AND BENAZEPRIL HYDROCHLORIDE 5; 10 MG/1; MG/1
1 CAPSULE ORAL DAILY
Qty: 30 CAPSULE | Refills: 5 | Status: SHIPPED | OUTPATIENT
Start: 2020-07-07 | End: 2021-01-05 | Stop reason: SDUPTHER

## 2020-07-07 RX ORDER — BUPROPION HYDROCHLORIDE 300 MG/1
300 TABLET ORAL EVERY MORNING
Qty: 30 TABLET | Refills: 11 | Status: SHIPPED | OUTPATIENT
Start: 2020-07-07 | End: 2021-01-05 | Stop reason: SDUPTHER

## 2020-07-07 RX ORDER — BUPROPION HYDROCHLORIDE 300 MG/1
TABLET ORAL
COMMUNITY
Start: 2020-06-15 | End: 2020-07-07 | Stop reason: SDUPTHER

## 2020-07-07 RX ORDER — MONTELUKAST SODIUM 10 MG/1
10 TABLET ORAL NIGHTLY
Qty: 30 TABLET | Refills: 11 | Status: SHIPPED | OUTPATIENT
Start: 2020-07-07 | End: 2021-01-05 | Stop reason: SDUPTHER

## 2020-07-07 NOTE — PROGRESS NOTES
"Subjective   Arlene Quesada is a 60 y.o. female.     History of Present Illness    Since the last visit, she has overall felt well.  She has Essential Hypertension and well controlled on current medication, Impaired fasting glucose and will monitor labs to watch for DMII, Hyperlipidemia and working on this with diet and exercise, Hypothyroidism and remains under the care of their Endocrinologist, Seasonal allergies and doing well on their medication  and Vitamin D deficiency and labs are at goal >30 ng/mL.  she has been compliant with current medications have reviewed them.  The patient denies medication side effects.  Will refill medications. /78 (BP Location: Right arm, Patient Position: Sitting, Cuff Size: Large Adult)   Pulse 78   Temp 98 °F (36.7 °C) (Skin)   Resp 16   Ht 160 cm (63\")   Wt 79.8 kg (176 lb)   LMP  (LMP Unknown)   SpO2 98%   BMI 31.18 kg/m²   2013 AHA (American Heart Association) Cholesterol Risk Ratio Score Goal is <=7.5% and your score is:  3.9%  Results for orders placed or performed in visit on 06/30/20   T3, Free   Result Value Ref Range    T3, Free 2.74 2.00 - 4.40 pg/mL   TSH   Result Value Ref Range    TSH 3.900 0.270 - 4.200 uIU/mL     Arlene Quesada female 60 y.o., /78 (BP Location: Right arm, Patient Position: Sitting, Cuff Size: Large Adult)   Pulse 78   Temp 98 °F (36.7 °C) (Skin)   Resp 16   Ht 160 cm (63\")   Wt 79.8 kg (176 lb)   LMP  (LMP Unknown)   SpO2 98%   BMI 31.18 kg/m²   who presents today for follow up of Depression and Anxiety.  She reports finally has the best combo ever to control anxiety and depression. Tried several meds and psychiatrist started this and works. Psych just moved out of state. I will cont. Her Rx if stay controlled. Anxiety not worse either on the Wellbutrin. . Onset of symptoms was approximately several years ago.  She denies current suicidal and homicidal ideation. Risk factors are family history of anxiety and or depression " and lifestyle of multiple roles.  Previous treatment includes current Rx.  She complains of the following medication side effects: none.  The patient has previously been in counseling..    Right side thyroid removed--Dr Schmid; he does thyroid Rx  Refill Valtrex for HSV; working  Macrobid is PRN  The following portions of the patient's history were reviewed and updated as appropriate: allergies, current medications, past family history, past medical history, past social history, past surgical history and problem list.    Review of Systems   Constitutional: Negative for activity change, appetite change and unexpected weight change.   HENT: Negative for nosebleeds and trouble swallowing.    Eyes: Negative for pain and visual disturbance.   Respiratory: Negative for chest tightness, shortness of breath and wheezing.    Cardiovascular: Negative for chest pain and palpitations.   Gastrointestinal: Negative for abdominal pain and blood in stool.   Endocrine: Negative.    Genitourinary: Negative for difficulty urinating and hematuria.   Musculoskeletal: Negative for joint swelling.   Skin: Negative for color change and rash.   Allergic/Immunologic: Negative.    Neurological: Negative for syncope and speech difficulty.   Hematological: Negative for adenopathy.   Psychiatric/Behavioral: Negative for agitation and confusion.   All other systems reviewed and are negative.      Objective   Physical Exam   Constitutional: She is oriented to person, place, and time. She appears well-developed and well-nourished. No distress.   HENT:   Head: Normocephalic and atraumatic.   Eyes: Pupils are equal, round, and reactive to light. Conjunctivae and EOM are normal. Right eye exhibits no discharge. Left eye exhibits no discharge. No scleral icterus.   Neck: Normal range of motion. Neck supple. No tracheal deviation present. No thyromegaly present.   Cardiovascular: Normal rate, regular rhythm, normal heart sounds, intact distal pulses  and normal pulses. Exam reveals no gallop.   No murmur heard.  Pulmonary/Chest: Effort normal and breath sounds normal. No respiratory distress. She has no wheezes. She has no rales.   Musculoskeletal: Normal range of motion.   Neurological: She is alert and oriented to person, place, and time. She exhibits normal muscle tone. Coordination normal.   Skin: Skin is warm. No rash noted. No erythema. No pallor.   Psychiatric: She has a normal mood and affect. Her behavior is normal. Judgment and thought content normal.   Nursing note and vitals reviewed.      Assessment/Plan   Arlene was seen today for hypertension.    Diagnoses and all orders for this visit:    Essential hypertension    Hypertension, essential, benign    Generalized anxiety disorder    Gastroesophageal reflux disease without esophagitis    Mixed hyperlipidemia    Vitamin D deficiency    Impaired fasting glucose    Other orders  -     amLODIPine-benazepril (LOTREL 5-10) 5-10 MG per capsule; Take 1 capsule by mouth Daily. For BP  -     buPROPion XL (WELLBUTRIN XL) 300 MG 24 hr tablet; Take 1 tablet by mouth Every Morning. For mood  -     valACYclovir (VALTREX) 1000 MG tablet; Take 1 tablet by mouth Daily.  -     nitrofurantoin, macrocrystal-monohydrate, (MACROBID) 100 MG capsule; Take 1 capsule by mouth Daily. For prophylaxis PRN  -     FLUoxetine (PROzac) 40 MG capsule; Take 2 capsules by mouth Daily. For stress  -     montelukast (SINGULAIR) 10 MG tablet; Take 1 tablet by mouth Every Night. For allergies    Plan, Arlene Quesada, was seen today.  she was seen for HTN and continue medication, Imparied fasting glucose and plan follow up labs, diet, and exercise, GERD and will continue on PPI medication, Hyperlipidemia and will work on this with diet and exercise, Hypothyroidism and under the care of Endocrinologist, Seasonal allergies and is doing well on their medication PRN and Vitamin D deficiency and supplemented.  Sees GYN  Sees ENT--thyroid  Watching  sodium

## 2020-10-24 ENCOUNTER — TELEPHONE (OUTPATIENT)
Dept: FAMILY MEDICINE CLINIC | Facility: CLINIC | Age: 60
End: 2020-10-24

## 2020-10-24 NOTE — TELEPHONE ENCOUNTER
On call. Spoke with patient. She is worried she has COVID. She is complaining of 99.1F, myalgias, feeling hot and nausea without vomiting. She denies any history of COPD.     She is not in any acute distress. She is speaking in full sentences. Recommended she present to  for COVID test

## 2020-11-03 ENCOUNTER — LAB (OUTPATIENT)
Dept: LAB | Facility: HOSPITAL | Age: 60
End: 2020-11-03

## 2020-11-03 ENCOUNTER — TRANSCRIBE ORDERS (OUTPATIENT)
Dept: ADMINISTRATIVE | Facility: HOSPITAL | Age: 60
End: 2020-11-03

## 2020-11-03 DIAGNOSIS — E89.0 POSTSURGICAL HYPOTHYROIDISM: Primary | ICD-10-CM

## 2020-11-03 DIAGNOSIS — E89.0 POSTSURGICAL HYPOTHYROIDISM: ICD-10-CM

## 2020-11-03 LAB
T3FREE SERPL-MCNC: 2.92 PG/ML (ref 2–4.4)
T4 FREE SERPL-MCNC: 0.83 NG/DL (ref 0.93–1.7)
TSH SERPL DL<=0.05 MIU/L-ACNC: 4.12 UIU/ML (ref 0.27–4.2)

## 2020-11-03 PROCEDURE — 84439 ASSAY OF FREE THYROXINE: CPT

## 2020-11-03 PROCEDURE — 84481 FREE ASSAY (FT-3): CPT

## 2020-11-03 PROCEDURE — 36415 COLL VENOUS BLD VENIPUNCTURE: CPT

## 2020-11-03 PROCEDURE — 84443 ASSAY THYROID STIM HORMONE: CPT

## 2021-01-05 ENCOUNTER — OFFICE VISIT (OUTPATIENT)
Dept: FAMILY MEDICINE CLINIC | Facility: CLINIC | Age: 61
End: 2021-01-05

## 2021-01-05 VITALS
TEMPERATURE: 97.5 F | OXYGEN SATURATION: 100 % | DIASTOLIC BLOOD PRESSURE: 60 MMHG | BODY MASS INDEX: 30.48 KG/M2 | HEART RATE: 70 BPM | WEIGHT: 172 LBS | HEIGHT: 63 IN | SYSTOLIC BLOOD PRESSURE: 118 MMHG | RESPIRATION RATE: 16 BRPM

## 2021-01-05 DIAGNOSIS — F41.1 GENERALIZED ANXIETY DISORDER: Chronic | ICD-10-CM

## 2021-01-05 DIAGNOSIS — E78.2 MIXED HYPERLIPIDEMIA: Chronic | ICD-10-CM

## 2021-01-05 DIAGNOSIS — K21.9 GASTROESOPHAGEAL REFLUX DISEASE WITHOUT ESOPHAGITIS: Chronic | ICD-10-CM

## 2021-01-05 DIAGNOSIS — R73.01 IMPAIRED FASTING GLUCOSE: Chronic | ICD-10-CM

## 2021-01-05 DIAGNOSIS — I10 HYPERTENSION, ESSENTIAL, BENIGN: Primary | Chronic | ICD-10-CM

## 2021-01-05 DIAGNOSIS — B00.1 FEVER BLISTER: Chronic | ICD-10-CM

## 2021-01-05 DIAGNOSIS — F33.42 RECURRENT MAJOR DEPRESSIVE DISORDER, IN FULL REMISSION (HCC): Chronic | ICD-10-CM

## 2021-01-05 PROCEDURE — 99214 OFFICE O/P EST MOD 30 MIN: CPT | Performed by: PHYSICIAN ASSISTANT

## 2021-01-05 RX ORDER — LEVOTHYROXINE, LIOTHYRONINE 38; 9 UG/1; UG/1
90 TABLET ORAL
COMMUNITY
Start: 2020-12-22

## 2021-01-05 RX ORDER — MONTELUKAST SODIUM 10 MG/1
10 TABLET ORAL NIGHTLY
Qty: 30 TABLET | Refills: 11 | Status: SHIPPED | OUTPATIENT
Start: 2021-01-05 | End: 2021-12-14 | Stop reason: SDUPTHER

## 2021-01-05 RX ORDER — FLUCONAZOLE 150 MG/1
150 TABLET ORAL ONCE
COMMUNITY
Start: 2020-12-29 | End: 2021-01-05 | Stop reason: SDUPTHER

## 2021-01-05 RX ORDER — LEVOFLOXACIN 500 MG/1
TABLET, FILM COATED ORAL
Qty: 10 TABLET | Refills: 0 | Status: SHIPPED | OUTPATIENT
Start: 2021-01-05 | End: 2021-01-20 | Stop reason: SDUPTHER

## 2021-01-05 RX ORDER — VALACYCLOVIR HYDROCHLORIDE 1 G/1
TABLET, FILM COATED ORAL
Qty: 20 TABLET | Refills: 5 | Status: SHIPPED | OUTPATIENT
Start: 2021-01-05 | End: 2022-01-30

## 2021-01-05 RX ORDER — AMLODIPINE BESYLATE AND BENAZEPRIL HYDROCHLORIDE 5; 10 MG/1; MG/1
1 CAPSULE ORAL DAILY
Qty: 30 CAPSULE | Refills: 5 | Status: SHIPPED | OUTPATIENT
Start: 2021-01-05 | End: 2021-01-20 | Stop reason: SDUPTHER

## 2021-01-05 RX ORDER — NITROFURANTOIN 25; 75 MG/1; MG/1
100 CAPSULE ORAL DAILY
Qty: 30 CAPSULE | Refills: 5 | Status: SHIPPED | OUTPATIENT
Start: 2021-01-05 | End: 2022-03-11 | Stop reason: SDUPTHER

## 2021-01-05 RX ORDER — FLUCONAZOLE 150 MG/1
150 TABLET ORAL ONCE
Qty: 1 TABLET | Refills: 2 | Status: SHIPPED | OUTPATIENT
Start: 2021-01-05 | End: 2021-12-28 | Stop reason: SDUPTHER

## 2021-01-05 RX ORDER — BUPROPION HYDROCHLORIDE 300 MG/1
300 TABLET ORAL EVERY MORNING
Qty: 30 TABLET | Refills: 11 | Status: SHIPPED | OUTPATIENT
Start: 2021-01-05 | End: 2021-12-14 | Stop reason: SDUPTHER

## 2021-01-05 RX ORDER — FLUOXETINE HYDROCHLORIDE 40 MG/1
80 CAPSULE ORAL DAILY
Qty: 60 CAPSULE | Refills: 11 | Status: SHIPPED | OUTPATIENT
Start: 2021-01-05 | End: 2021-12-14 | Stop reason: SDUPTHER

## 2021-01-05 RX ORDER — TIMOLOL MALEATE 5 MG/ML
SOLUTION/ DROPS OPHTHALMIC
COMMUNITY
Start: 2020-11-23

## 2021-01-05 NOTE — PROGRESS NOTES
"Subjective   Arlene Quesada is a 60 y.o. female.     History of Present Illness    Since the last visit, she has overall felt fairly well.  She has Essential Hypertension and well controlled on current medication, Impaired fasting glucose and will monitor labs to watch for DMII, GERD controlled on PPI Rx, Hyperlipidemia and working on this with diet and exercise, Hypothyroidism and remains under the care of their Endocrinologist, Seasonal allergies and doing well on their medication  and Vitamin D deficiency and labs are at goal >30 ng/mL.  she has been compliant with current medications have reviewed them.  The patient denies medication side effects.  Will refill medications. /60 (BP Location: Left arm, Patient Position: Sitting, Cuff Size: Adult)   Pulse 70   Temp 97.5 °F (36.4 °C)   Resp 16   Ht 160 cm (62.99\")   Wt 78 kg (172 lb)   LMP  (LMP Unknown)   SpO2 100%   BMI 30.48 kg/m²   Some trouble finding words. Dad had dementia;  I want her to have neuropsych eval;  Talked about brain MRI; she wants to wait on this for now  Tested for sleep apnea 8 mos ago  Has order for mammo  Results for orders placed or performed in visit on 11/03/20   TSH    Specimen: Blood   Result Value Ref Range    TSH 4.120 0.270 - 4.200 uIU/mL   T3, Free    Specimen: Blood   Result Value Ref Range    T3, Free 2.92 2.00 - 4.40 pg/mL   T4, Free    Specimen: Blood   Result Value Ref Range    Free T4 0.83 (L) 0.93 - 1.70 ng/dL     Sees DR Schmid for thyroid---right lobectomy   Nostril sore--needs to restart Bactroban  Arlene Quesada female 60 y.o., /60 (BP Location: Left arm, Patient Position: Sitting, Cuff Size: Adult)   Pulse 70   Temp 97.5 °F (36.4 °C)   Resp 16   Ht 160 cm (62.99\")   Wt 78 kg (172 lb)   LMP  (LMP Unknown)   SpO2 100%   BMI 30.48 kg/m²   who presents today for follow up of Depression and Anxiety.  She reports medication is working well, patient desires to continue on Rx, and needs refill. Onset " of symptoms was approximately several years ago.  She denies current suicidal and homicidal ideation. Risk factors are family history of anxiety and or depression and lifestyle of multiple roles.  Previous treatment includes current Rx.  She complains of the following medication side effects: none.  The patient has previously been in counseling..  This is her best combo; works      Restarted exercise--cross fit  The following portions of the patient's history were reviewed and updated as appropriate: allergies, current medications, past family history, past medical history, past social history, past surgical history and problem list.    Review of Systems   Constitutional: Negative for activity change, appetite change, fatigue and unexpected weight change.   HENT: Negative for nosebleeds and trouble swallowing.    Eyes: Negative for pain and visual disturbance.   Respiratory: Negative for chest tightness, shortness of breath and wheezing.    Cardiovascular: Negative for chest pain and palpitations.   Gastrointestinal: Negative for abdominal pain and blood in stool.   Endocrine: Negative.    Genitourinary: Negative for difficulty urinating and hematuria.   Musculoskeletal: Negative for joint swelling.   Skin: Negative for color change and rash.   Allergic/Immunologic: Negative.    Neurological: Negative for syncope and speech difficulty.   Hematological: Negative for adenopathy.   Psychiatric/Behavioral: Negative for agitation, confusion and dysphoric mood. The patient is not nervous/anxious.    All other systems reviewed and are negative.      Objective   Physical Exam  Constitutional:       General: She is not in acute distress.     Appearance: She is well-developed. She is not ill-appearing, toxic-appearing or diaphoretic.   HENT:      Head: Normocephalic and atraumatic.      Right Ear: External ear normal.      Left Ear: External ear normal.   Eyes:      General: No scleral icterus.     Conjunctiva/sclera:  Conjunctivae normal.   Neck:      Musculoskeletal: Normal range of motion.      Vascular: No carotid bruit.   Pulmonary:      Effort: Pulmonary effort is normal. No respiratory distress.      Breath sounds: No rales.   Musculoskeletal: Normal range of motion.      Right lower leg: No edema.      Left lower leg: No edema.   Skin:     General: Skin is dry.      Coloration: Skin is not jaundiced.   Neurological:      General: No focal deficit present.      Mental Status: She is alert and oriented to person, place, and time. Mental status is at baseline.      Coordination: Coordination normal.   Psychiatric:         Mood and Affect: Mood normal.         Behavior: Behavior normal.         Thought Content: Thought content normal.         Judgment: Judgment normal.         Assessment/Plan   Diagnoses and all orders for this visit:    1. Hypertension, essential, benign (Primary)  -     Comprehensive metabolic panel; Future  -     Lipid panel; Future  -     Vitamin B12; Future  -     Folate; Future  -     CBC & Differential; Future  -     Hemoglobin A1c; Future    2. Mixed hyperlipidemia  -     Comprehensive metabolic panel; Future  -     Lipid panel; Future  -     Vitamin B12; Future  -     Folate; Future  -     CBC & Differential; Future  -     Hemoglobin A1c; Future    3. Generalized anxiety disorder  -     Comprehensive metabolic panel; Future  -     Lipid panel; Future  -     Vitamin B12; Future  -     Folate; Future  -     CBC & Differential; Future  -     Hemoglobin A1c; Future    4. Recurrent major depressive disorder, in full remission (CMS/HCC)  -     Comprehensive metabolic panel; Future  -     Lipid panel; Future  -     Vitamin B12; Future  -     Folate; Future  -     CBC & Differential; Future  -     Hemoglobin A1c; Future    5. Impaired fasting glucose  -     Comprehensive metabolic panel; Future  -     Lipid panel; Future  -     Vitamin B12; Future  -     Folate; Future  -     CBC & Differential; Future  -      Hemoglobin A1c; Future    6. Gastroesophageal reflux disease without esophagitis  -     Comprehensive metabolic panel; Future  -     Lipid panel; Future  -     Vitamin B12; Future  -     Folate; Future  -     CBC & Differential; Future  -     Hemoglobin A1c; Future    7. Fever blister    Other orders  -     mupirocin (Bactroban) 2 % ointment; Apply  topically to the appropriate area as directed 3 (Three) Times a Day. To wound area until healed  Dispense: 30 each; Refill: 1  -     levoFLOXacin (LEVAQUIN) 500 MG tablet; One PO daily for infection  Dispense: 10 tablet; Refill: 0  -     nitrofurantoin, macrocrystal-monohydrate, (MACROBID) 100 MG capsule; Take 1 capsule by mouth Daily. For prophylaxis PRN  Dispense: 30 capsule; Refill: 5  -     terconazole (TERAZOL 3) 0.8 % vaginal cream; Insert 1 applicator into the vagina Every Night. (put on file)  Dispense: 20 each; Refill: 5  -     fluconazole (DIFLUCAN) 150 MG tablet; Take 1 tablet by mouth 1 (One) Time for 1 dose.  Dispense: 1 tablet; Refill: 2  -     montelukast (SINGULAIR) 10 MG tablet; Take 1 tablet by mouth Every Night. For allergies  Dispense: 30 tablet; Refill: 11  -     amLODIPine-benazepril (LOTREL 5-10) 5-10 MG per capsule; Take 1 capsule by mouth Daily. For BP  Dispense: 30 capsule; Refill: 5  -     buPROPion XL (WELLBUTRIN XL) 300 MG 24 hr tablet; Take 1 tablet by mouth Every Morning. For mood  Dispense: 30 tablet; Refill: 11  -     FLUoxetine (PROzac) 40 MG capsule; Take 2 capsules by mouth Daily. For stress  Dispense: 60 capsule; Refill: 11  -     valACYclovir (Valtrex) 1000 MG tablet; Two PO at onset fever blister and repeat this dose X1 in 12 hours  Dispense: 20 tablet; Refill: 5        Emely, Arlene Quesada, was seen today.  she was seen for HTN and continue medication, Imparied fasting glucose and plan follow up labs, diet, and exercise, Hyperlipidemia and will work on this with diet and exercise, Hypothyroidism and under the care of  Endocrinologist, Seasonal allergies and is doing well on their medication PRN and Vitamin D deficiency and supplemented.  Lab June  Thyroid--ENT  Cont Prozac and Wellbutrin---is working for anxiety and depression  Sees GYN  Nostril sore--needs to restart Bactroban

## 2021-01-20 RX ORDER — LEVOFLOXACIN 500 MG/1
TABLET, FILM COATED ORAL
Qty: 10 TABLET | Refills: 0 | Status: SHIPPED | OUTPATIENT
Start: 2021-01-20 | End: 2021-12-14

## 2021-01-20 RX ORDER — AMLODIPINE BESYLATE AND BENAZEPRIL HYDROCHLORIDE 5; 10 MG/1; MG/1
1 CAPSULE ORAL DAILY
Qty: 30 CAPSULE | Refills: 5 | Status: SHIPPED | OUTPATIENT
Start: 2021-01-20 | End: 2021-06-08 | Stop reason: SDUPTHER

## 2021-02-02 ENCOUNTER — LAB (OUTPATIENT)
Dept: LAB | Facility: HOSPITAL | Age: 61
End: 2021-02-02

## 2021-02-02 ENCOUNTER — TRANSCRIBE ORDERS (OUTPATIENT)
Dept: ADMINISTRATIVE | Facility: HOSPITAL | Age: 61
End: 2021-02-02

## 2021-02-02 DIAGNOSIS — E89.0 POSTSURGICAL HYPOTHYROIDISM: ICD-10-CM

## 2021-02-02 DIAGNOSIS — E89.0 POSTSURGICAL HYPOTHYROIDISM: Primary | ICD-10-CM

## 2021-02-02 LAB
T3FREE SERPL-MCNC: 2.94 PG/ML (ref 2–4.4)
T4 FREE SERPL-MCNC: 0.87 NG/DL (ref 0.93–1.7)
TSH SERPL DL<=0.05 MIU/L-ACNC: 2.5 UIU/ML (ref 0.27–4.2)

## 2021-02-02 PROCEDURE — 36415 COLL VENOUS BLD VENIPUNCTURE: CPT

## 2021-02-02 PROCEDURE — 84481 FREE ASSAY (FT-3): CPT

## 2021-02-02 PROCEDURE — 84443 ASSAY THYROID STIM HORMONE: CPT

## 2021-02-02 PROCEDURE — 84439 ASSAY OF FREE THYROXINE: CPT

## 2021-06-08 ENCOUNTER — OFFICE VISIT (OUTPATIENT)
Dept: FAMILY MEDICINE CLINIC | Facility: CLINIC | Age: 61
End: 2021-06-08

## 2021-06-08 VITALS
TEMPERATURE: 97.8 F | DIASTOLIC BLOOD PRESSURE: 58 MMHG | BODY MASS INDEX: 30.12 KG/M2 | OXYGEN SATURATION: 95 % | HEART RATE: 72 BPM | HEIGHT: 63 IN | WEIGHT: 170 LBS | RESPIRATION RATE: 16 BRPM | SYSTOLIC BLOOD PRESSURE: 114 MMHG

## 2021-06-08 DIAGNOSIS — R73.01 IMPAIRED FASTING GLUCOSE: ICD-10-CM

## 2021-06-08 DIAGNOSIS — E78.2 MIXED HYPERLIPIDEMIA: ICD-10-CM

## 2021-06-08 DIAGNOSIS — I10 HYPERTENSION, ESSENTIAL, BENIGN: Primary | ICD-10-CM

## 2021-06-08 DIAGNOSIS — F41.1 GENERALIZED ANXIETY DISORDER: ICD-10-CM

## 2021-06-08 DIAGNOSIS — K21.9 GASTROESOPHAGEAL REFLUX DISEASE WITHOUT ESOPHAGITIS: ICD-10-CM

## 2021-06-08 DIAGNOSIS — F33.42 RECURRENT MAJOR DEPRESSIVE DISORDER, IN FULL REMISSION (HCC): ICD-10-CM

## 2021-06-08 DIAGNOSIS — R41.3 MEMORY LOSS, SHORT TERM: ICD-10-CM

## 2021-06-08 PROCEDURE — 99214 OFFICE O/P EST MOD 30 MIN: CPT | Performed by: PHYSICIAN ASSISTANT

## 2021-06-08 RX ORDER — AMLODIPINE BESYLATE AND BENAZEPRIL HYDROCHLORIDE 5; 10 MG/1; MG/1
1 CAPSULE ORAL DAILY
Qty: 30 CAPSULE | Refills: 5 | Status: SHIPPED | OUTPATIENT
Start: 2021-06-08 | End: 2021-12-14 | Stop reason: SDUPTHER

## 2021-06-08 RX ORDER — TOBRAMYCIN AND DEXAMETHASONE 3; 1 MG/ML; MG/ML
SUSPENSION/ DROPS OPHTHALMIC
COMMUNITY
Start: 2021-04-15

## 2021-06-08 NOTE — PROGRESS NOTES
"Subjective   Arlene Quesada is a 61 y.o. female.     History of Present Illness    Since the last visit, she has overall felt fairly well.  She has Essential Hypertension and well controlled on current medication, Impaired fasting glucose and will monitor labs to watch for DMII, Hyperlipidemia and working on this with diet and exercise, Hypothyroidism and remains under the care of their Endocrinologist, Vitamin D deficiency and will update labs for continued management and GERD--on PPI.  she has been compliant with current medications have reviewed them.  The patient denies medication side effects.  Will refill medications. /58   Pulse 72   Temp 97.8 °F (36.6 °C) (Tympanic)   Resp 16   Ht 160 cm (63\")   Wt 77.1 kg (170 lb)   LMP  (LMP Unknown)   SpO2 95%   BMI 30.11 kg/m²     Results for orders placed or performed in visit on 02/02/21   TSH    Specimen: Blood   Result Value Ref Range    TSH 2.500 0.270 - 4.200 uIU/mL   T3, Free    Specimen: Blood   Result Value Ref Range    T3, Free 2.94 2.00 - 4.40 pg/mL   T4, Free    Specimen: Blood   Result Value Ref Range    Free T4 0.87 (L) 0.93 - 1.70 ng/dL       Dr Schmid --thyroid;--right side thyroidectomy  She is still concern STM loss and talked about this last time--still want neuropsych eval.  Will at least get CT head. Word finding C/o  Recent testing sleep apnea---barley has it; no Rx for now  Has fam hx---parents---grandparents  She has GERD on Nexium---refer to GI---Dr Haro  Has PRN Macrobid for UTI--works  Doing great on Wellbutrin XL and Prozac for anxiety/depression  The following portions of the patient's history were reviewed and updated as appropriate: allergies, current medications, past family history, past medical history, past social history, past surgical history and problem list.    Review of Systems   Constitutional: Negative for activity change, appetite change and unexpected weight change.   HENT: Negative for nosebleeds and trouble " swallowing.    Eyes: Negative for pain and visual disturbance.   Respiratory: Negative for chest tightness, shortness of breath and wheezing.    Cardiovascular: Negative for chest pain and palpitations.   Gastrointestinal: Negative for abdominal pain and blood in stool.   Endocrine: Negative.    Genitourinary: Negative for difficulty urinating and hematuria.   Musculoskeletal: Negative for joint swelling.   Skin: Negative for color change and rash.   Allergic/Immunologic: Negative.    Neurological: Negative for syncope and speech difficulty.   Hematological: Negative for adenopathy.   Psychiatric/Behavioral: Negative for agitation, confusion and dysphoric mood. The patient is not nervous/anxious.    All other systems reviewed and are negative.      Objective   Physical Exam  Vitals and nursing note reviewed.   Constitutional:       General: She is not in acute distress.     Appearance: She is well-developed. She is not ill-appearing, toxic-appearing or diaphoretic.   HENT:      Head: Normocephalic and atraumatic.      Right Ear: External ear normal.      Left Ear: External ear normal.      Nose: Nose normal.      Mouth/Throat:      Pharynx: Oropharynx is clear.   Eyes:      General: No scleral icterus.     Conjunctiva/sclera: Conjunctivae normal.      Pupils: Pupils are equal, round, and reactive to light.   Neck:      Thyroid: No thyromegaly.      Vascular: No carotid bruit.   Cardiovascular:      Rate and Rhythm: Normal rate and regular rhythm.      Pulses: Normal pulses.      Heart sounds: Normal heart sounds. No murmur heard.     Pulmonary:      Effort: Pulmonary effort is normal. No respiratory distress.      Breath sounds: Normal breath sounds.   Musculoskeletal:         General: No deformity. Normal range of motion.      Cervical back: Normal range of motion and neck supple.   Skin:     General: Skin is warm and dry.      Findings: No rash.   Neurological:      General: No focal deficit present.      Mental  Status: She is alert and oriented to person, place, and time. Mental status is at baseline.      Coordination: Coordination normal.   Psychiatric:         Mood and Affect: Mood normal.         Behavior: Behavior normal.         Thought Content: Thought content normal.         Judgment: Judgment normal.         Assessment/Plan   Diagnoses and all orders for this visit:    1. Hypertension, essential, benign (Primary)  -     Ambulatory Referral to Psychology  -     Ambulatory Referral to Gastroenterology  -     CT Head Without Contrast; Future  -     Comprehensive metabolic panel  -     Lipid panel  -     Hemoglobin A1c  -     Vitamin D 25 Hydroxy  -     Vitamin B12  -     Folate  -     Magnesium  -     CBC & Differential    2. Gastroesophageal reflux disease without esophagitis  -     Ambulatory Referral to Psychology  -     Ambulatory Referral to Gastroenterology  -     CT Head Without Contrast; Future  -     Comprehensive metabolic panel  -     Lipid panel  -     Hemoglobin A1c  -     Vitamin D 25 Hydroxy  -     Vitamin B12  -     Folate  -     Magnesium  -     CBC & Differential    3. Generalized anxiety disorder  -     Ambulatory Referral to Psychology  -     Ambulatory Referral to Gastroenterology  -     CT Head Without Contrast; Future  -     Comprehensive metabolic panel  -     Lipid panel  -     Hemoglobin A1c  -     Vitamin D 25 Hydroxy  -     Vitamin B12  -     Folate  -     Magnesium  -     CBC & Differential    4. Mixed hyperlipidemia  -     Ambulatory Referral to Psychology  -     Ambulatory Referral to Gastroenterology  -     CT Head Without Contrast; Future  -     Comprehensive metabolic panel  -     Lipid panel  -     Hemoglobin A1c  -     Vitamin D 25 Hydroxy  -     Vitamin B12  -     Folate  -     Magnesium  -     CBC & Differential    5. Recurrent major depressive disorder, in full remission (CMS/[facility])  -     Ambulatory Referral to Psychology  -     Ambulatory Referral to Gastroenterology  -     CT  Head Without Contrast; Future  -     Comprehensive metabolic panel  -     Lipid panel  -     Hemoglobin A1c  -     Vitamin D 25 Hydroxy  -     Vitamin B12  -     Folate  -     Magnesium  -     CBC & Differential    6. Impaired fasting glucose  -     Ambulatory Referral to Psychology  -     Ambulatory Referral to Gastroenterology  -     CT Head Without Contrast; Future  -     Comprehensive metabolic panel  -     Lipid panel  -     Hemoglobin A1c  -     Vitamin D 25 Hydroxy  -     Vitamin B12  -     Folate  -     Magnesium  -     CBC & Differential    7. Memory loss, short term  -     Ambulatory Referral to Psychology  -     Ambulatory Referral to Gastroenterology  -     CT Head Without Contrast; Future  -     Comprehensive metabolic panel  -     Lipid panel  -     Hemoglobin A1c  -     Vitamin D 25 Hydroxy  -     Vitamin B12  -     Folate  -     Magnesium  -     CBC & Differential    Other orders  -     amLODIPine-benazepril (LOTREL 5-10) 5-10 MG per capsule; Take 1 capsule by mouth Daily. For BP  Dispense: 30 capsule; Refill: 5      Consider raising dose Nexium----has break through GERD  Plan, Arlene Quesada, was seen today.  she was seen for HTN and continue medication, Imparied fasting glucose and plan follow up labs, diet, and exercise, GERD and will continue on PPI medication, Hyperlipidemia and will work on this with diet and exercise, Seasonal allergies and is doing well on their medication PRN and Vitamin D deficiency and will update labs . Thyroid per ENT  Refer neuroposych eval STM loss and family hx dementia  Cont Prozac and Wellbutrin---works for anxiety and depression  Ok Macrobid PRN UTI    Valtrex PRN fever blisters works  Mg d/t PPI use

## 2021-06-09 LAB
25(OH)D3+25(OH)D2 SERPL-MCNC: 80.1 NG/ML (ref 30–100)
ALBUMIN SERPL-MCNC: 4.7 G/DL (ref 3.5–5.2)
ALBUMIN/GLOB SERPL: 1.8 G/DL
ALP SERPL-CCNC: 110 U/L (ref 39–117)
ALT SERPL-CCNC: 20 U/L (ref 1–33)
AST SERPL-CCNC: 17 U/L (ref 1–32)
BASOPHILS # BLD AUTO: 0.04 10*3/MM3 (ref 0–0.2)
BASOPHILS NFR BLD AUTO: 0.4 % (ref 0–1.5)
BILIRUB SERPL-MCNC: <0.2 MG/DL (ref 0–1.2)
BUN SERPL-MCNC: 12 MG/DL (ref 8–23)
BUN/CREAT SERPL: 16.2 (ref 7–25)
CALCIUM SERPL-MCNC: 9.4 MG/DL (ref 8.6–10.5)
CHLORIDE SERPL-SCNC: 105 MMOL/L (ref 98–107)
CHOLEST SERPL-MCNC: 197 MG/DL (ref 0–200)
CO2 SERPL-SCNC: 26.7 MMOL/L (ref 22–29)
CREAT SERPL-MCNC: 0.74 MG/DL (ref 0.57–1)
EOSINOPHIL # BLD AUTO: 0.26 10*3/MM3 (ref 0–0.4)
EOSINOPHIL NFR BLD AUTO: 2.6 % (ref 0.3–6.2)
ERYTHROCYTE [DISTWIDTH] IN BLOOD BY AUTOMATED COUNT: 13.1 % (ref 12.3–15.4)
FOLATE SERPL-MCNC: 13.9 NG/ML (ref 4.78–24.2)
GLOBULIN SER CALC-MCNC: 2.6 GM/DL
GLUCOSE SERPL-MCNC: 103 MG/DL (ref 65–99)
HBA1C MFR BLD: 5.5 % (ref 4.8–5.6)
HCT VFR BLD AUTO: 38.8 % (ref 34–46.6)
HDLC SERPL-MCNC: 51 MG/DL (ref 40–60)
HGB BLD-MCNC: 12.6 G/DL (ref 12–15.9)
IMM GRANULOCYTES # BLD AUTO: 0.03 10*3/MM3 (ref 0–0.05)
IMM GRANULOCYTES NFR BLD AUTO: 0.3 % (ref 0–0.5)
LDLC SERPL CALC-MCNC: 109 MG/DL (ref 0–100)
LYMPHOCYTES # BLD AUTO: 2.43 10*3/MM3 (ref 0.7–3.1)
LYMPHOCYTES NFR BLD AUTO: 24.4 % (ref 19.6–45.3)
MAGNESIUM SERPL-MCNC: 2.1 MG/DL (ref 1.6–2.4)
MCH RBC QN AUTO: 29.7 PG (ref 26.6–33)
MCHC RBC AUTO-ENTMCNC: 32.5 G/DL (ref 31.5–35.7)
MCV RBC AUTO: 91.5 FL (ref 79–97)
MONOCYTES # BLD AUTO: 0.62 10*3/MM3 (ref 0.1–0.9)
MONOCYTES NFR BLD AUTO: 6.2 % (ref 5–12)
NEUTROPHILS # BLD AUTO: 6.58 10*3/MM3 (ref 1.7–7)
NEUTROPHILS NFR BLD AUTO: 66.1 % (ref 42.7–76)
NRBC BLD AUTO-RTO: 0 /100 WBC (ref 0–0.2)
PLATELET # BLD AUTO: 488 10*3/MM3 (ref 140–450)
POTASSIUM SERPL-SCNC: 3.9 MMOL/L (ref 3.5–5.2)
PROT SERPL-MCNC: 7.3 G/DL (ref 6–8.5)
RBC # BLD AUTO: 4.24 10*6/MM3 (ref 3.77–5.28)
SODIUM SERPL-SCNC: 142 MMOL/L (ref 136–145)
TRIGL SERPL-MCNC: 214 MG/DL (ref 0–150)
VIT B12 SERPL-MCNC: 706 PG/ML (ref 211–946)
VLDLC SERPL CALC-MCNC: 37 MG/DL (ref 5–40)
WBC # BLD AUTO: 9.96 10*3/MM3 (ref 3.4–10.8)

## 2021-06-25 ENCOUNTER — HOSPITAL ENCOUNTER (OUTPATIENT)
Dept: CT IMAGING | Facility: HOSPITAL | Age: 61
Discharge: HOME | End: 2021-06-25
Admitting: PHYSICIAN ASSISTANT

## 2021-06-25 DIAGNOSIS — E78.2 MIXED HYPERLIPIDEMIA: ICD-10-CM

## 2021-06-25 DIAGNOSIS — F41.1 GENERALIZED ANXIETY DISORDER: ICD-10-CM

## 2021-06-25 DIAGNOSIS — R73.01 IMPAIRED FASTING GLUCOSE: ICD-10-CM

## 2021-06-25 DIAGNOSIS — R41.3 MEMORY LOSS, SHORT TERM: ICD-10-CM

## 2021-06-25 DIAGNOSIS — I10 HYPERTENSION, ESSENTIAL, BENIGN: ICD-10-CM

## 2021-06-25 DIAGNOSIS — F33.42 RECURRENT MAJOR DEPRESSIVE DISORDER, IN FULL REMISSION (HCC): ICD-10-CM

## 2021-06-25 DIAGNOSIS — K21.9 GASTROESOPHAGEAL REFLUX DISEASE WITHOUT ESOPHAGITIS: ICD-10-CM

## 2021-06-25 PROCEDURE — 70450 CT HEAD/BRAIN W/O DYE: CPT

## 2021-07-23 ENCOUNTER — LAB (OUTPATIENT)
Dept: LAB | Facility: HOSPITAL | Age: 61
End: 2021-07-23

## 2021-07-23 ENCOUNTER — TRANSCRIBE ORDERS (OUTPATIENT)
Dept: ADMINISTRATIVE | Facility: HOSPITAL | Age: 61
End: 2021-07-23

## 2021-07-23 DIAGNOSIS — E89.0 POSTSURGICAL HYPOTHYROIDISM: Primary | ICD-10-CM

## 2021-07-23 DIAGNOSIS — E89.0 POSTSURGICAL HYPOTHYROIDISM: ICD-10-CM

## 2021-07-23 LAB
T3FREE SERPL-MCNC: 2.79 PG/ML (ref 2–4.4)
T4 FREE SERPL-MCNC: 0.83 NG/DL (ref 0.93–1.7)
TSH SERPL DL<=0.05 MIU/L-ACNC: 2.89 UIU/ML (ref 0.27–4.2)

## 2021-07-23 PROCEDURE — 84481 FREE ASSAY (FT-3): CPT

## 2021-07-23 PROCEDURE — 84439 ASSAY OF FREE THYROXINE: CPT

## 2021-07-23 PROCEDURE — 36415 COLL VENOUS BLD VENIPUNCTURE: CPT

## 2021-07-23 PROCEDURE — 84443 ASSAY THYROID STIM HORMONE: CPT

## 2021-09-03 ENCOUNTER — OFFICE (OUTPATIENT)
Dept: URBAN - METROPOLITAN AREA CLINIC 65 | Facility: CLINIC | Age: 61
End: 2021-09-03

## 2021-09-03 VITALS
DIASTOLIC BLOOD PRESSURE: 70 MMHG | SYSTOLIC BLOOD PRESSURE: 104 MMHG | HEIGHT: 63 IN | HEART RATE: 74 BPM | WEIGHT: 161 LBS

## 2021-09-03 DIAGNOSIS — K57.90 DIVERTICULOSIS OF INTESTINE, PART UNSPECIFIED, WITHOUT PERFO: ICD-10-CM

## 2021-09-03 DIAGNOSIS — K21.9 GASTRO-ESOPHAGEAL REFLUX DISEASE WITHOUT ESOPHAGITIS: ICD-10-CM

## 2021-09-03 PROCEDURE — 99242 OFF/OP CONSLTJ NEW/EST SF 20: CPT | Performed by: INTERNAL MEDICINE

## 2021-11-30 ENCOUNTER — LAB REQUISITION (OUTPATIENT)
Dept: LAB | Facility: HOSPITAL | Age: 61
End: 2021-11-30

## 2021-11-30 DIAGNOSIS — Z00.00 ENCOUNTER FOR GENERAL ADULT MEDICAL EXAMINATION WITHOUT ABNORMAL FINDINGS: ICD-10-CM

## 2021-11-30 PROCEDURE — U0004 COV-19 TEST NON-CDC HGH THRU: HCPCS | Performed by: INTERNAL MEDICINE

## 2021-12-01 LAB — SARS-COV-2 ORF1AB RESP QL NAA+PROBE: NOT DETECTED

## 2021-12-02 ENCOUNTER — OFFICE (OUTPATIENT)
Dept: URBAN - METROPOLITAN AREA PATHOLOGY 4 | Facility: PATHOLOGY | Age: 61
End: 2021-12-02

## 2021-12-02 ENCOUNTER — AMBULATORY SURGICAL CENTER (OUTPATIENT)
Dept: URBAN - METROPOLITAN AREA SURGERY 20 | Facility: SURGERY | Age: 61
End: 2021-12-02

## 2021-12-02 VITALS — HEIGHT: 63 IN

## 2021-12-02 DIAGNOSIS — K31.89 OTHER DISEASES OF STOMACH AND DUODENUM: ICD-10-CM

## 2021-12-02 DIAGNOSIS — K21.9 GASTRO-ESOPHAGEAL REFLUX DISEASE WITHOUT ESOPHAGITIS: ICD-10-CM

## 2021-12-02 DIAGNOSIS — K44.9 DIAPHRAGMATIC HERNIA WITHOUT OBSTRUCTION OR GANGRENE: ICD-10-CM

## 2021-12-02 DIAGNOSIS — K29.60 OTHER GASTRITIS WITHOUT BLEEDING: ICD-10-CM

## 2021-12-02 LAB
GI HISTOLOGY: A. SELECT: (no result)
GI HISTOLOGY: PDF REPORT: (no result)

## 2021-12-02 PROCEDURE — 88305 TISSUE EXAM BY PATHOLOGIST: CPT | Performed by: INTERNAL MEDICINE

## 2021-12-02 PROCEDURE — 43239 EGD BIOPSY SINGLE/MULTIPLE: CPT | Performed by: INTERNAL MEDICINE

## 2021-12-14 ENCOUNTER — OFFICE VISIT (OUTPATIENT)
Dept: FAMILY MEDICINE CLINIC | Facility: CLINIC | Age: 61
End: 2021-12-14

## 2021-12-14 VITALS
RESPIRATION RATE: 16 BRPM | HEART RATE: 75 BPM | HEIGHT: 63 IN | DIASTOLIC BLOOD PRESSURE: 59 MMHG | SYSTOLIC BLOOD PRESSURE: 121 MMHG | TEMPERATURE: 97.5 F | BODY MASS INDEX: 29.41 KG/M2 | OXYGEN SATURATION: 98 % | WEIGHT: 166 LBS

## 2021-12-14 DIAGNOSIS — R73.01 IMPAIRED FASTING GLUCOSE: ICD-10-CM

## 2021-12-14 DIAGNOSIS — F41.1 GENERALIZED ANXIETY DISORDER: ICD-10-CM

## 2021-12-14 DIAGNOSIS — E78.2 MIXED HYPERLIPIDEMIA: ICD-10-CM

## 2021-12-14 DIAGNOSIS — E55.9 VITAMIN D DEFICIENCY: ICD-10-CM

## 2021-12-14 DIAGNOSIS — F33.42 RECURRENT MAJOR DEPRESSIVE DISORDER, IN FULL REMISSION (HCC): ICD-10-CM

## 2021-12-14 DIAGNOSIS — I10 HYPERTENSION, ESSENTIAL, BENIGN: Primary | ICD-10-CM

## 2021-12-14 DIAGNOSIS — K21.9 GASTROESOPHAGEAL REFLUX DISEASE WITHOUT ESOPHAGITIS: ICD-10-CM

## 2021-12-14 PROCEDURE — 99214 OFFICE O/P EST MOD 30 MIN: CPT | Performed by: PHYSICIAN ASSISTANT

## 2021-12-14 RX ORDER — BUPROPION HYDROCHLORIDE 300 MG/1
300 TABLET ORAL EVERY MORNING
Qty: 30 TABLET | Refills: 11 | Status: SHIPPED | OUTPATIENT
Start: 2021-12-14 | End: 2022-01-05

## 2021-12-14 RX ORDER — AMLODIPINE BESYLATE AND BENAZEPRIL HYDROCHLORIDE 5; 10 MG/1; MG/1
CAPSULE ORAL
Qty: 30 CAPSULE | Refills: 5 | OUTPATIENT
Start: 2021-12-14

## 2021-12-14 RX ORDER — FLUOXETINE HYDROCHLORIDE 40 MG/1
80 CAPSULE ORAL DAILY
Qty: 60 CAPSULE | Refills: 11 | Status: SHIPPED | OUTPATIENT
Start: 2021-12-14 | End: 2022-02-07

## 2021-12-14 RX ORDER — AMLODIPINE BESYLATE AND BENAZEPRIL HYDROCHLORIDE 5; 10 MG/1; MG/1
1 CAPSULE ORAL DAILY
Qty: 30 CAPSULE | Refills: 5 | Status: SHIPPED | OUTPATIENT
Start: 2021-12-14 | End: 2022-02-09

## 2021-12-14 RX ORDER — MONTELUKAST SODIUM 10 MG/1
10 TABLET ORAL NIGHTLY
Qty: 30 TABLET | Refills: 11 | Status: SHIPPED | OUTPATIENT
Start: 2021-12-14 | End: 2022-02-06

## 2021-12-14 NOTE — PROGRESS NOTES
"Subjective   Arlene Quesada is a 61 y.o. female.     History of Present Illness    Since the last visit, she has overall felt well.  She has Essential Hypertension and well controlled on current medication, Impaired fasting glucose and will monitor labs to watch for DMII, Hyperlipidemia and working on this with diet and exercise, Hypothyroidism and remains under the care of their specialist and Vitamin D deficiency and will update labs for continued management.  she has been compliant with current medications have reviewed them.  The patient denies medication side effects.  Will refill medications. /59 (BP Location: Left arm, Patient Position: Sitting, Cuff Size: Adult)   Pulse 75   Temp 97.5 °F (36.4 °C)   Resp 16   Ht 160 cm (62.99\")   Wt 75.3 kg (166 lb)   LMP  (LMP Unknown)   SpO2 98%   BMI 29.41 kg/m²   Still has GERD----f/u DR Haro  Results for orders placed or performed in visit on 11/30/21   COVID-19,APTIMA PANTHER(SHANNAN),[facility], NP/OP SWAB IN UTM/VTM/[facility] [employer],24 HR TAT - Swab, Nasopharynx    Specimen: Nasopharynx; Swab   Result Value Ref Range    COVID19 Not Detected Not Detected - Ref. Range       Arlene Quesada female 61 y.o., /59 (BP Location: Left arm, Patient Position: Sitting, Cuff Size: Adult)   Pulse 75   Temp 97.5 °F (36.4 °C)   Resp 16   Ht 160 cm (62.99\")   Wt 75.3 kg (166 lb)   LMP  (LMP Unknown)   SpO2 98%   BMI 29.41 kg/m²   who presents today for follow up of Depression and Anxiety.  She reports medication is working well, patient desires to continue on Rx, and needs refill. Onset of symptoms was approximately several years ago. She denies current suicidal and homicidal ideation. Risk factors are family history of anxiety and or depression and lifestyle of multiple roles.  Previous treatment includes current Rx. She complains of the following medication side effects:none. The patient has previously been in counseling..    Having allergy C/o's      Doing " great on Wellbutrin XL and Prozac for anxiety/depression  Dr Schmid --thyroid;--right side thyroidectomy  She has GERD on Nexium---refer to GI---Dr Haro  Has PRN Macrobid for UTI--works    Did CT head d/t some memory changes---small vessel disease;  Will work on stimulating mind first---consider neuropsychology teresa  Had EGD 12-2-21---Dr Haro      6-8-21 2013 AHA (American Heart Association) Cholesterol Risk Ratio Score Goal is <=7.5% and your score is:  3.66%--- this is okay and will watch her cholesterol.  Triglycerides are up just a little bit and work on diet and exercis Glucose was just above 100 but your A1c is normal and does not show diabetes.  Other labs look okay the platelet count is in baselines      Did see ortho for knee    Sees eye doc----glaucoma stable    The following portions of the patient's history were reviewed and updated as appropriate: allergies, current medications, past family history, past medical history, past social history, past surgical history and problem list.    Review of Systems   Constitutional: Negative for activity change, appetite change and unexpected weight change.   HENT: Negative for nosebleeds and trouble swallowing.    Eyes: Negative for pain and visual disturbance.   Respiratory: Negative for chest tightness, shortness of breath and wheezing.    Cardiovascular: Negative for chest pain and palpitations.   Gastrointestinal: Negative for abdominal pain and blood in stool.   Endocrine: Negative.    Genitourinary: Negative for difficulty urinating and hematuria.   Musculoskeletal: Positive for arthralgias and joint swelling.   Skin: Negative for color change and rash.   Allergic/Immunologic: Negative.    Neurological: Negative for syncope and speech difficulty.   Hematological: Negative for adenopathy.   Psychiatric/Behavioral: Negative for agitation and confusion.   All other systems reviewed and are negative.      Objective   Physical Exam  Vitals and nursing note  reviewed.   Constitutional:       General: She is not in acute distress.     Appearance: She is well-developed. She is not ill-appearing or toxic-appearing.   HENT:      Head: Normocephalic.      Right Ear: External ear normal.      Left Ear: External ear normal.      Nose: Nose normal.      Mouth/Throat:      Pharynx: Oropharynx is clear.   Eyes:      General: No scleral icterus.     Conjunctiva/sclera: Conjunctivae normal.      Pupils: Pupils are equal, round, and reactive to light.   Neck:      Thyroid: No thyromegaly.   Cardiovascular:      Rate and Rhythm: Normal rate and regular rhythm.      Heart sounds: Normal heart sounds. No murmur heard.      Pulmonary:      Effort: Pulmonary effort is normal. No respiratory distress.      Breath sounds: Normal breath sounds.   Musculoskeletal:         General: No deformity. Normal range of motion.      Cervical back: Normal range of motion and neck supple.   Skin:     General: Skin is warm and dry.      Findings: No rash.   Neurological:      General: No focal deficit present.      Mental Status: She is alert and oriented to person, place, and time. Mental status is at baseline.   Psychiatric:         Mood and Affect: Mood normal.         Behavior: Behavior normal.         Thought Content: Thought content normal.         Judgment: Judgment normal.         Assessment/Plan   Diagnoses and all orders for this visit:    1. Hypertension, essential, benign (Primary)  -     Comprehensive metabolic panel; Future  -     Lipid panel; Future  -     Vitamin B12; Future  -     Folate; Future  -     Vitamin D 25 Hydroxy; Future  -     Hemoglobin A1c; Future    2. Gastroesophageal reflux disease without esophagitis  -     Comprehensive metabolic panel; Future  -     Lipid panel; Future  -     Vitamin B12; Future  -     Folate; Future  -     Vitamin D 25 Hydroxy; Future  -     Hemoglobin A1c; Future    3. Generalized anxiety disorder  -     Comprehensive metabolic panel; Future  -      Lipid panel; Future  -     Vitamin B12; Future  -     Folate; Future  -     Vitamin D 25 Hydroxy; Future  -     Hemoglobin A1c; Future    4. Recurrent major depressive disorder, in full remission (HCC)  -     Comprehensive metabolic panel; Future  -     Lipid panel; Future  -     Vitamin B12; Future  -     Folate; Future  -     Vitamin D 25 Hydroxy; Future  -     Hemoglobin A1c; Future    5. Impaired fasting glucose  -     Comprehensive metabolic panel; Future  -     Lipid panel; Future  -     Vitamin B12; Future  -     Folate; Future  -     Vitamin D 25 Hydroxy; Future  -     Hemoglobin A1c; Future    6. Mixed hyperlipidemia  -     Comprehensive metabolic panel; Future  -     Lipid panel; Future  -     Vitamin B12; Future  -     Folate; Future  -     Vitamin D 25 Hydroxy; Future  -     Hemoglobin A1c; Future    7. Vitamin D deficiency  -     Comprehensive metabolic panel; Future  -     Lipid panel; Future  -     Vitamin B12; Future  -     Folate; Future  -     Vitamin D 25 Hydroxy; Future  -     Hemoglobin A1c; Future        Call DR aHro about GERD and had chemical gastritis  ? If need BID  Labs in June  Plan, Arleneodessa Quesada, was seen today.  she was seen for HTN and continue medication, Imparied fasting glucose and plan follow up labs, diet, and exercise, Hyperlipidemia and will work on this with diet and exercise, Hypothyroidism and under the care of specialist and Vitamin D deficiency and will update labs .  Still has GERD---just saw GI---call about this and EGD DR Estrellita Crook works PRN fever blister  Cont Wellbutrin XL and Prozac --works for depression; anxiety  Sees opthal  See ENT---allergy

## 2021-12-28 ENCOUNTER — TELEPHONE (OUTPATIENT)
Dept: FAMILY MEDICINE CLINIC | Facility: CLINIC | Age: 61
End: 2021-12-28

## 2021-12-28 ENCOUNTER — OFFICE VISIT (OUTPATIENT)
Dept: FAMILY MEDICINE CLINIC | Facility: CLINIC | Age: 61
End: 2021-12-28

## 2021-12-28 VITALS
TEMPERATURE: 97.5 F | HEART RATE: 76 BPM | BODY MASS INDEX: 29.55 KG/M2 | DIASTOLIC BLOOD PRESSURE: 62 MMHG | WEIGHT: 166.8 LBS | RESPIRATION RATE: 16 BRPM | OXYGEN SATURATION: 99 % | HEIGHT: 63 IN | SYSTOLIC BLOOD PRESSURE: 121 MMHG

## 2021-12-28 DIAGNOSIS — R05.9 COUGH: Primary | ICD-10-CM

## 2021-12-28 LAB
EXPIRATION DATE: NORMAL
FLUAV AG NPH QL: NEGATIVE
FLUBV AG NPH QL: NEGATIVE
INTERNAL CONTROL: NORMAL
Lab: NORMAL

## 2021-12-28 PROCEDURE — 87804 INFLUENZA ASSAY W/OPTIC: CPT | Performed by: PHYSICIAN ASSISTANT

## 2021-12-28 PROCEDURE — 99213 OFFICE O/P EST LOW 20 MIN: CPT | Performed by: PHYSICIAN ASSISTANT

## 2021-12-28 RX ORDER — PREDNISONE 20 MG/1
20 TABLET ORAL 2 TIMES DAILY
Qty: 10 TABLET | Refills: 0 | Status: SHIPPED | OUTPATIENT
Start: 2021-12-28 | End: 2022-09-12 | Stop reason: SDDI

## 2021-12-28 RX ORDER — LEVOFLOXACIN 500 MG/1
TABLET, FILM COATED ORAL
Qty: 14 TABLET | Refills: 1 | Status: SHIPPED | OUTPATIENT
Start: 2021-12-28 | End: 2022-03-25

## 2021-12-28 RX ORDER — FLUCONAZOLE 150 MG/1
150 TABLET ORAL ONCE
Qty: 1 TABLET | Refills: 11 | Status: SHIPPED | OUTPATIENT
Start: 2021-12-28 | End: 2022-09-12 | Stop reason: SDUPTHER

## 2021-12-28 NOTE — TELEPHONE ENCOUNTER
Caller: Arlene Quesada     Relationship: SELF    Best call back number: [phone]     What is your medical concern? LOW GRADE FEVER, SORE THROAT, WEAK, HEADACHE      How long has this issue been going on? YESTERDAY    Is your provider already aware of this issue? NO    Have you been treated for this issue? NO    ADDITIONAL: PATIENT STATED THAT URGENT CARE CENTERS ARE FULL.  PATIENT REQUESTING GUIDANCE ON WHAT SHE SHOULD DO AT THIS TIME.    SHE DOES NOT BELIEVE IT IS COVID.  FIRST AVAILABLE WITH TAYLA BOURNE IS 1/12/22    [id number] [address] [facility] [address] [facility] [phone] [facility] [phone] FX

## 2021-12-28 NOTE — PROGRESS NOTES
Subjective   Arlene Quesada is a 61 y.o. female.     History of Present Illness   Arlene Quesada 61 y.o. female who presents for evaluation of sinus pressure and congestion, fever, sore throat, cough, fatigue. Symptoms include ear pressure, sore throat, post nasal drip, myalgias, headache, sinus pain, fever, chills, fatigue and runny nose.  Onset of symptoms was 1 day ago, gradually worsening since that time. Patient denies shortness of breath, wheezing.   Evaluation to date: none Treatment to date:  OTC antihistamines and nasal steroid sprary.   Runny, stuffy, sneezing, achy, sore throat, tired  Stop antibiotic if tendon pain develops.  This medication can cause tendon rupture, though rare.  She does best on Levaquin;  Do pred first; fill Levaquin if worse    Flu test A and B-----neg  COVID rapid today neg    Spouse had this  Onset yesterday    The following portions of the patient's history were reviewed and updated as appropriate: allergies, current medications, past family history, past medical history, past social history, past surgical history and problem list.    Review of Systems   Constitutional: Positive for fatigue. Negative for activity change, appetite change and unexpected weight change.   HENT: Positive for congestion, postnasal drip, rhinorrhea, sinus pressure, sinus pain and sore throat. Negative for ear discharge, nosebleeds and trouble swallowing.    Eyes: Negative for pain and visual disturbance.   Respiratory: Negative for cough, chest tightness, shortness of breath and wheezing.    Cardiovascular: Negative for chest pain and palpitations.   Gastrointestinal: Negative for abdominal pain and blood in stool.   Endocrine: Negative.    Genitourinary: Negative for difficulty urinating and hematuria.   Musculoskeletal: Positive for myalgias. Negative for joint swelling.   Skin: Negative for color change and rash.   Allergic/Immunologic: Negative.    Neurological: Negative for syncope and speech  difficulty.   Hematological: Negative for adenopathy.   Psychiatric/Behavioral: Negative for agitation and confusion.   All other systems reviewed and are negative.      Objective   Physical Exam  Vitals and nursing note reviewed.   Constitutional:       General: She is not in acute distress.     Appearance: She is well-developed. She is ill-appearing. She is not toxic-appearing or diaphoretic.   HENT:      Head: Normocephalic and atraumatic. Hair is normal.      Right Ear: External ear normal. No drainage, swelling or tenderness. Tympanic membrane is retracted.      Left Ear: External ear normal. No drainage, swelling or tenderness. Tympanic membrane is retracted.      Nose: Mucosal edema present.      Mouth/Throat:      Mouth: No oral lesions.      Pharynx: Uvula midline. Posterior oropharyngeal erythema present. No oropharyngeal exudate or uvula swelling.   Eyes:      General: No scleral icterus.        Right eye: No discharge.         Left eye: No discharge.      Conjunctiva/sclera: Conjunctivae normal.      Pupils: Pupils are equal, round, and reactive to light.   Cardiovascular:      Rate and Rhythm: Normal rate and regular rhythm.      Heart sounds: Normal heart sounds. No murmur heard.  No gallop.    Pulmonary:      Effort: No respiratory distress.      Breath sounds: Normal breath sounds. No stridor. No wheezing or rales.   Chest:      Chest wall: No tenderness.   Abdominal:      Palpations: Abdomen is soft.      Tenderness: There is no abdominal tenderness.   Musculoskeletal:      Cervical back: Normal range of motion and neck supple.   Lymphadenopathy:      Cervical: Cervical adenopathy present.   Skin:     General: Skin is warm and dry.      Findings: No rash.   Neurological:      General: No focal deficit present.      Mental Status: She is alert and oriented to person, place, and time.      Motor: No abnormal muscle tone.   Psychiatric:         Mood and Affect: Mood normal.         Behavior: Behavior  normal.         Thought Content: Thought content normal.         Judgment: Judgment normal.         Assessment/Plan   Diagnoses and all orders for this visit:    1. Cough (Primary)  -     POCT Influenza A/B    Other orders  -     fluconazole (DIFLUCAN) 150 MG tablet; Take 1 tablet by mouth 1 (One) Time for 1 dose.  Dispense: 1 tablet; Refill: 11  -     predniSONE (DELTASONE) 20 MG tablet; Take 1 tablet by mouth 2 (Two) Times a Day. With food for 5 days  Dispense: 10 tablet; Refill: 0  -     levoFLOXacin (LEVAQUIN) 500 MG tablet; One PO daily for infection  Dispense: 14 tablet; Refill: 1      Want her to test for COVID test day 5---Friday  Start Levaquin if secondary infx--stop pred  Stop antibiotic if tendon pain develops.  This medication can cause tendon rupture, though rare.

## 2021-12-28 NOTE — PATIENT INSTRUCTIONS

## 2021-12-28 NOTE — TELEPHONE ENCOUNTER
Symptoms of a virus.  She can go to urgent care if worse.  Antibiotics will not help when it is viral.  She can take Tylenol and Mucinex

## 2022-01-05 RX ORDER — BUPROPION HYDROCHLORIDE 300 MG/1
300 TABLET ORAL EVERY MORNING
Qty: 30 TABLET | Refills: 11 | Status: SHIPPED | OUTPATIENT
Start: 2022-01-05 | End: 2022-09-12 | Stop reason: SDUPTHER

## 2022-01-30 RX ORDER — VALACYCLOVIR HYDROCHLORIDE 1 G/1
TABLET, FILM COATED ORAL
Qty: 20 TABLET | Refills: 5 | Status: SHIPPED | OUTPATIENT
Start: 2022-01-30 | End: 2022-09-12 | Stop reason: SDUPTHER

## 2022-02-06 RX ORDER — MONTELUKAST SODIUM 10 MG/1
10 TABLET ORAL NIGHTLY
Qty: 30 TABLET | Refills: 11 | Status: SHIPPED | OUTPATIENT
Start: 2022-02-06 | End: 2022-09-12 | Stop reason: SDUPTHER

## 2022-02-07 RX ORDER — FLUOXETINE HYDROCHLORIDE 40 MG/1
CAPSULE ORAL
Qty: 60 CAPSULE | Refills: 11 | Status: SHIPPED | OUTPATIENT
Start: 2022-02-07 | End: 2022-09-12 | Stop reason: SDUPTHER

## 2022-02-08 ENCOUNTER — TRANSCRIBE ORDERS (OUTPATIENT)
Dept: ADMINISTRATIVE | Facility: HOSPITAL | Age: 62
End: 2022-02-08

## 2022-02-08 ENCOUNTER — LAB (OUTPATIENT)
Dept: LAB | Facility: HOSPITAL | Age: 62
End: 2022-02-08

## 2022-02-08 DIAGNOSIS — E89.0 POSTSURGICAL HYPOTHYROIDISM: ICD-10-CM

## 2022-02-08 DIAGNOSIS — E89.0 POSTSURGICAL HYPOTHYROIDISM: Primary | ICD-10-CM

## 2022-02-08 LAB
T3FREE SERPL-MCNC: 3.13 PG/ML (ref 2–4.4)
T4 FREE SERPL-MCNC: 0.82 NG/DL (ref 0.93–1.7)
TSH SERPL DL<=0.05 MIU/L-ACNC: 1.47 UIU/ML (ref 0.27–4.2)

## 2022-02-08 PROCEDURE — 36415 COLL VENOUS BLD VENIPUNCTURE: CPT

## 2022-02-08 PROCEDURE — 84481 FREE ASSAY (FT-3): CPT

## 2022-02-08 PROCEDURE — 84439 ASSAY OF FREE THYROXINE: CPT

## 2022-02-08 PROCEDURE — 84443 ASSAY THYROID STIM HORMONE: CPT

## 2022-02-09 RX ORDER — AMLODIPINE BESYLATE AND BENAZEPRIL HYDROCHLORIDE 5; 10 MG/1; MG/1
CAPSULE ORAL
Qty: 30 CAPSULE | Refills: 5 | Status: SHIPPED | OUTPATIENT
Start: 2022-02-09 | End: 2022-08-15

## 2022-03-11 RX ORDER — NITROFURANTOIN 25; 75 MG/1; MG/1
100 CAPSULE ORAL DAILY
Qty: 30 CAPSULE | Refills: 5 | Status: SHIPPED | OUTPATIENT
Start: 2022-03-11 | End: 2022-09-12 | Stop reason: SDUPTHER

## 2022-03-11 NOTE — TELEPHONE ENCOUNTER
----- Message from Lisa Daigle MA sent at 3/11/2022  9:57 AM EST -----  Regarding: FW: Refill  Please advise.  Thanks   ----- Message -----  From: Arlene Quesada  Sent: 3/11/2022   8:58 AM EST  To: [facility]  Subject: Refill                                           Please send me a new Rx for Nitrofurantoin mono-MCR.  As we have discussed in the past, I take them to prevent infection when performing XXX.

## 2022-03-25 RX ORDER — LEVOFLOXACIN 500 MG/1
TABLET, FILM COATED ORAL
Qty: 10 TABLET | Refills: 0 | Status: SHIPPED | OUTPATIENT
Start: 2022-03-25

## 2022-03-25 RX ORDER — LEVOFLOXACIN 500 MG/1
TABLET, FILM COATED ORAL
Qty: 14 TABLET | Refills: 1 | Status: SHIPPED | OUTPATIENT
Start: 2022-03-25 | End: 2022-09-12

## 2022-07-06 ENCOUNTER — TELEPHONE (OUTPATIENT)
Dept: FAMILY MEDICINE CLINIC | Facility: CLINIC | Age: 62
End: 2022-07-06

## 2022-07-06 NOTE — TELEPHONE ENCOUNTER
Caller: Arlene Quesada    Relationship: Self    Best call back number: [phone]     What is the best time to reach you: ANY     Who are you requesting to speak with (clinical staff, provider,  specific staff member): CLINICAL     Do you know the name of the person who called: PATIENT     What was the call regarding: PATIENT WANTS TO KNOW WHAT SHE NEEDS TO DO EITHER MAKE AN APPOINTMENT OR LABS ?     Do you require a callback: YES

## 2022-08-12 ENCOUNTER — LAB (OUTPATIENT)
Dept: LAB | Facility: HOSPITAL | Age: 62
End: 2022-08-12

## 2022-08-12 PROCEDURE — 82746 ASSAY OF FOLIC ACID SERUM: CPT | Performed by: PHYSICIAN ASSISTANT

## 2022-08-12 PROCEDURE — 83036 HEMOGLOBIN GLYCOSYLATED A1C: CPT | Performed by: PHYSICIAN ASSISTANT

## 2022-08-12 PROCEDURE — 82306 VITAMIN D 25 HYDROXY: CPT | Performed by: PHYSICIAN ASSISTANT

## 2022-08-12 PROCEDURE — 82607 VITAMIN B-12: CPT | Performed by: PHYSICIAN ASSISTANT

## 2022-08-12 PROCEDURE — 80053 COMPREHEN METABOLIC PANEL: CPT | Performed by: PHYSICIAN ASSISTANT

## 2022-08-12 PROCEDURE — 80061 LIPID PANEL: CPT | Performed by: PHYSICIAN ASSISTANT

## 2022-08-15 RX ORDER — AMLODIPINE BESYLATE AND BENAZEPRIL HYDROCHLORIDE 5; 10 MG/1; MG/1
CAPSULE ORAL
Qty: 30 CAPSULE | Refills: 0 | Status: SHIPPED | OUTPATIENT
Start: 2022-08-15 | End: 2022-09-12 | Stop reason: SDUPTHER

## 2022-09-12 ENCOUNTER — OFFICE VISIT (OUTPATIENT)
Dept: FAMILY MEDICINE CLINIC | Facility: CLINIC | Age: 62
End: 2022-09-12

## 2022-09-12 VITALS
DIASTOLIC BLOOD PRESSURE: 65 MMHG | WEIGHT: 177.6 LBS | OXYGEN SATURATION: 100 % | RESPIRATION RATE: 16 BRPM | HEART RATE: 78 BPM | HEIGHT: 63 IN | BODY MASS INDEX: 31.47 KG/M2 | TEMPERATURE: 97.5 F | SYSTOLIC BLOOD PRESSURE: 115 MMHG

## 2022-09-12 DIAGNOSIS — F33.42 RECURRENT MAJOR DEPRESSIVE DISORDER, IN FULL REMISSION: ICD-10-CM

## 2022-09-12 DIAGNOSIS — I10 HYPERTENSION, ESSENTIAL, BENIGN: Primary | ICD-10-CM

## 2022-09-12 DIAGNOSIS — E78.2 MIXED HYPERLIPIDEMIA: ICD-10-CM

## 2022-09-12 DIAGNOSIS — K21.9 GASTROESOPHAGEAL REFLUX DISEASE WITHOUT ESOPHAGITIS: ICD-10-CM

## 2022-09-12 DIAGNOSIS — E89.0 POST-SURGICAL HYPOTHYROIDISM: ICD-10-CM

## 2022-09-12 DIAGNOSIS — R73.01 IMPAIRED FASTING GLUCOSE: ICD-10-CM

## 2022-09-12 DIAGNOSIS — F41.1 GENERALIZED ANXIETY DISORDER: ICD-10-CM

## 2022-09-12 PROCEDURE — 99214 OFFICE O/P EST MOD 30 MIN: CPT | Performed by: PHYSICIAN ASSISTANT

## 2022-09-12 RX ORDER — FLUOXETINE HYDROCHLORIDE 40 MG/1
80 CAPSULE ORAL DAILY
Qty: 180 CAPSULE | Refills: 3 | Status: SHIPPED | OUTPATIENT
Start: 2022-09-12

## 2022-09-12 RX ORDER — VALACYCLOVIR HYDROCHLORIDE 1 G/1
TABLET, FILM COATED ORAL
Qty: 20 TABLET | Refills: 5 | Status: SHIPPED | OUTPATIENT
Start: 2022-09-12 | End: 2022-12-30

## 2022-09-12 RX ORDER — FLUCONAZOLE 150 MG/1
150 TABLET ORAL ONCE
Qty: 1 TABLET | Refills: 11 | Status: SHIPPED | OUTPATIENT
Start: 2022-09-12 | End: 2023-04-06

## 2022-09-12 RX ORDER — AMLODIPINE BESYLATE AND BENAZEPRIL HYDROCHLORIDE 5; 10 MG/1; MG/1
1 CAPSULE ORAL DAILY
Qty: 90 CAPSULE | Refills: 1 | Status: SHIPPED | OUTPATIENT
Start: 2022-09-12 | End: 2023-02-27

## 2022-09-12 RX ORDER — BUPROPION HYDROCHLORIDE 300 MG/1
300 TABLET ORAL EVERY MORNING
Qty: 90 TABLET | Refills: 3 | Status: SHIPPED | OUTPATIENT
Start: 2022-09-12

## 2022-09-12 RX ORDER — NITROFURANTOIN 25; 75 MG/1; MG/1
100 CAPSULE ORAL DAILY
Qty: 30 CAPSULE | Refills: 5 | Status: SHIPPED | OUTPATIENT
Start: 2022-09-12

## 2022-09-12 RX ORDER — MONTELUKAST SODIUM 10 MG/1
10 TABLET ORAL NIGHTLY
Qty: 90 TABLET | Refills: 3 | Status: SHIPPED | OUTPATIENT
Start: 2022-09-12

## 2022-09-12 RX ORDER — METFORMIN HYDROCHLORIDE 500 MG/1
TABLET, EXTENDED RELEASE ORAL
Qty: 60 TABLET | Refills: 5 | Status: SHIPPED | OUTPATIENT
Start: 2022-09-12

## 2022-09-12 NOTE — PATIENT INSTRUCTIONS
"[email or web address]">   Generalized Anxiety Disorder, Adult  Generalized anxiety disorder (LASHELL) is a mental health condition. Unlike normal worries, anxiety related to LASHELL is not triggered by a specific event. These worries do not fade or get better with time. LASHELL interferes with relationships, work, and school.  LASHELL symptoms can vary from mild to severe. People with severe LASHELL can have intense waves of anxiety with physical symptoms that are similar to panic attacks.  What are the causes?  The exact cause of LASHELL is not known, but the following are believed to have an impact:  Differences in natural brain chemicals.  Genes passed down from parents to children.  Differences in the way threats are perceived.  Development during childhood.  Personality.  What increases the risk?  The following factors may make you more likely to develop this condition:  Being female.  Having a family history of anxiety disorders.  Being very shy.  Experiencing very stressful life events, such as the death of a loved one.  Having a very stressful family environment.  What are the signs or symptoms?  People with LASHELL often worry excessively about many things in their lives, such as their health and family. Symptoms may also include:  Mental and emotional symptoms:  Worrying excessively about natural disasters.  Fear of being late.  Difficulty concentrating.  Fears that others are judging your performance.  Physical symptoms:  Fatigue.  Headaches, muscle tension, muscle twitches, trembling, or feeling shaky.  Feeling like your heart is pounding or beating very fast.  Feeling out of breath or like you cannot take a deep breath.  Having trouble falling asleep or staying asleep, or experiencing restlessness.  Sweating.  Nausea, diarrhea, or irritable bowel syndrome (IBS).  Behavioral symptoms:  Experiencing erratic moods or irritability.  Avoidance of new situations.  Avoidance of people.  Extreme difficulty making decisions.  How is this " diagnosed?  This condition is diagnosed based on your symptoms and medical history. You will also have a physical exam. Your health care provider may perform tests to rule out other possible causes of your symptoms.  To be diagnosed with LASHELL, a person must have anxiety that:  Is out of his or her control.  Affects several different aspects of his or her life, such as work and relationships.  Causes distress that makes him or her unable to take part in normal activities.  Includes at least three symptoms of LASHELL, such as restlessness, fatigue, trouble concentrating, irritability, muscle tension, or sleep problems.  Before your health care provider can confirm a diagnosis of LASHELL, these symptoms must be present more days than they are not, and they must last for 6 months or longer.  How is this treated?  This condition may be treated with:  Medicine. Antidepressant medicine is usually prescribed for long-term daily control. Anti-anxiety medicines may be added in severe cases, especially when panic attacks occur.  Talk therapy (psychotherapy). Certain types of talk therapy can be helpful in treating LASHELL by providing support, education, and guidance. Options include:  Cognitive behavioral therapy (CBT). People learn coping skills and self-calming techniques to ease their physical symptoms. They learn to identify unrealistic thoughts and behaviors and to replace them with more appropriate thoughts and behaviors.  Acceptance and commitment therapy (ACT). This treatment teaches people how to be mindful as a way to cope with unwanted thoughts and feelings.  Biofeedback. This process trains you to manage your body's response (physiological response) through breathing techniques and relaxation methods. You will work with a therapist while machines are used to monitor your physical symptoms.  Stress management techniques. These include yoga, meditation, and exercise.  A mental health specialist can help determine which treatment  is best for you. Some people see improvement with one type of therapy. However, other people require a combination of therapies.  Follow these instructions at home:  Lifestyle  Maintain a consistent routine and schedule.  Anticipate stressful situations. Create a plan, and allow extra time to work with your plan.  Practice stress management or self-calming techniques that you have learned from your therapist or your health care provider.  General instructions  Take over-the-counter and prescription medicines only as told by your health care provider.  Understand that you are likely to have setbacks. Accept this and be kind to yourself as you persist to take better care of yourself.  Recognize and accept your accomplishments, even if you  them as small.  Keep all follow-up visits as told by your health care provider. This is important.  Contact a health care provider if:  Your symptoms do not get better.  Your symptoms get worse.  You have signs of depression, such as:  A persistently sad or irritable mood.  Loss of enjoyment in activities that used to bring you brandon.  Change in weight or eating.  Changes in sleeping habits.  Avoiding friends or family members.  Loss of energy for normal tasks.  Feelings of guilt or worthlessness.  Get help right away if:  You have serious thoughts about hurting yourself or others.  If you ever feel like you may hurt yourself or others, or have thoughts about taking your own life, get help right away. Go to your nearest emergency department or:  Call your local emergency services (174 in the [place]  Call a suicide crisis helpline, such as the National Suicide Prevention Lifeline at 1-294.614.1762. This is open 24 hours a day in the [place]  Text the Crisis Text Line at 128625 (in the [place]  Summary  Generalized anxiety disorder (LASHELL) is a mental health condition that involves worry that is not triggered by a specific event.  People with LASHELL often worry excessively about many things  in their lives, such as their health and family.  LASHELL may cause symptoms such as restlessness, trouble concentrating, sleep problems, frequent sweating, nausea, diarrhea, headaches, and trembling or muscle twitching.  A mental health specialist can help determine which treatment is best for you. Some people see improvement with one type of therapy. However, other people require a combination of therapies.  This information is not intended to replace advice given to you by your health care provider. Make sure you discuss any questions you have with your health care provider.  Document Revised: 10/07/2020 Document Reviewed: 10/07/2020  Elsevier Patient Education © 2021 [employer]

## 2022-09-12 NOTE — PROGRESS NOTES
"Subjective   Arlene Quesada is a 62 y.o. female.     History of Present Illness    Since the last visit, she has overall felt fairly well.  She has Primary Hypertension and well controlled on current medication, Impaired fasting glucose and will monitor labs to watch for DMII, Hyperlipidemia and working on this with diet and exercise, Hypothyroidism and remains under the care of their specialist, Seasonal allergies and doing well on their medication  and Vitamin D deficiency and will update labs for continued management.  she has been compliant with current medications have reviewed them.  The patient denies medication side effects.  Will refill medications. /65 (BP Location: Left arm, Patient Position: Sitting, Cuff Size: Adult)   Pulse 78   Temp 97.5 °F (36.4 °C)   Resp 16   Ht 160 cm (62.99\")   Wt 80.6 kg (177 lb 9.6 oz)   LMP  (LMP Unknown)   SpO2 100%   BMI 31.47 kg/m²   Not much exercise;  Weight is up  Results for orders placed or performed in visit on 06/01/22   Comprehensive metabolic panel    Specimen: Blood   Result Value Ref Range    Glucose 92 65 - 99 mg/dL    BUN 13 8 - 23 mg/dL    Creatinine 0.85 0.57 - 1.00 mg/dL    Sodium 136 136 - 145 mmol/L    Potassium 4.5 3.5 - 5.2 mmol/L    Chloride 100 98 - 107 mmol/L    CO2 26.6 22.0 - 29.0 mmol/L    Calcium 9.3 8.6 - 10.5 mg/dL    Total Protein 7.6 6.0 - 8.5 g/dL    Albumin 4.20 3.50 - 5.20 g/dL    ALT (SGPT) 25 1 - 33 U/L    AST (SGOT) 27 1 - 32 U/L    Alkaline Phosphatase 102 39 - 117 U/L    Total Bilirubin 0.3 0.0 - 1.2 mg/dL    Globulin 3.4 gm/dL    A/G Ratio 1.2 g/dL    BUN/Creatinine Ratio 15.3 7.0 - 25.0    Anion Gap 9.4 5.0 - 15.0 mmol/L    eGFR 78.1 >60.0 mL/min/1.73   Lipid panel    Specimen: Blood   Result Value Ref Range    Total Cholesterol 196 0 - 200 mg/dL    Triglycerides 120 0 - 150 mg/dL    HDL Cholesterol 54 40 - 60 mg/dL    LDL Cholesterol  121 (H) 0 - 100 mg/dL    VLDL Cholesterol 21 5 - 40 mg/dL    LDL/HDL Ratio 2.19  "   Vitamin B12    Specimen: Blood   Result Value Ref Range    Vitamin B-12 716 211 - 946 pg/mL   Folate    Specimen: Blood   Result Value Ref Range    Folate 14.60 4.78 - 24.20 ng/mL   Vitamin D 25 Hydroxy    Specimen: Blood   Result Value Ref Range    25 Hydroxy, Vitamin D 77.4 30.0 - 100.0 ng/ml   Hemoglobin A1c    Specimen: Blood   Result Value Ref Range    Hemoglobin A1C 5.70 (H) 4.80 - 5.60 %     The 10-year ASCVD risk score (Tammyluan SWIFT Jr., et al., 2013) is: 4%    Values used to calculate the score:      Age: 62 years      Sex: Female      Is Non- : No      Diabetic: No      Tobacco smoker: No      Systolic Blood Pressure: 115 mmHg      Is BP treated: Yes      HDL Cholesterol: 54 mg/dL      Total Cholesterol: 196 mg/dL    Brother DM    Saw GYN----3-28-22  Stable on Wellbutrin XL and Prozac for anxiety/depression  Dr Schmid --thyroid;--right side thyroidectomy  She has GERD on Nexium---refer to GI---Dr Haro  Has PRN Macrobid for UTI--works     Did CT head d/t some memory changes---small vessel disease;  Will work on stimulating mind first---consider neuropsychology teresa  Had EGD 12-2-21---Dr Haro----still has GERD  VAltrex works PRN fever blister  Sees eye doc----glaucoma stable  The following portions of the patient's history were reviewed and updated as appropriate: allergies, current medications, past family history, past medical history, past social history, past surgical history and problem list.    Review of Systems   Constitutional: Negative for activity change, appetite change and unexpected weight change.   HENT: Negative for nosebleeds and trouble swallowing.    Eyes: Negative for pain and visual disturbance.   Respiratory: Negative for chest tightness, shortness of breath and wheezing.    Cardiovascular: Negative for chest pain and palpitations.   Gastrointestinal: Negative for abdominal pain and blood in stool.   Endocrine: Negative.    Genitourinary: Negative for difficulty  urinating and hematuria.   Musculoskeletal: Negative for joint swelling.   Skin: Negative for color change and rash.   Allergic/Immunologic: Negative.    Neurological: Negative for syncope and speech difficulty.   Hematological: Negative for adenopathy.   Psychiatric/Behavioral: Negative for agitation and confusion. The patient is nervous/anxious.    All other systems reviewed and are negative.      Objective   Physical Exam  Vitals and nursing note reviewed.   Constitutional:       General: She is not in acute distress.     Appearance: She is well-developed. She is not ill-appearing or toxic-appearing.   HENT:      Head: Normocephalic.      Right Ear: External ear normal.      Left Ear: External ear normal.      Nose: Nose normal.      Mouth/Throat:      Pharynx: Oropharynx is clear.   Eyes:      General: No scleral icterus.     Conjunctiva/sclera: Conjunctivae normal.      Pupils: Pupils are equal, round, and reactive to light.   Neck:      Thyroid: No thyromegaly.   Cardiovascular:      Rate and Rhythm: Normal rate and regular rhythm.      Pulses: Normal pulses.      Heart sounds: Normal heart sounds. No murmur heard.  Pulmonary:      Effort: Pulmonary effort is normal. No respiratory distress.      Breath sounds: Normal breath sounds.   Musculoskeletal:         General: No deformity. Normal range of motion.      Cervical back: Normal range of motion and neck supple.   Skin:     General: Skin is warm and dry.      Findings: No rash.   Neurological:      General: No focal deficit present.      Mental Status: She is alert and oriented to person, place, and time. Mental status is at baseline.   Psychiatric:         Mood and Affect: Mood normal.         Behavior: Behavior normal.         Thought Content: Thought content normal.         Judgment: Judgment normal.         Assessment & Plan   Diagnoses and all orders for this visit:    1. Hypertension, essential, benign (Primary)  -     Comprehensive Metabolic Panel;  Future  -     Hemoglobin A1c; Future    2. Mixed hyperlipidemia  -     Comprehensive Metabolic Panel; Future  -     Hemoglobin A1c; Future    3. Generalized anxiety disorder  -     Comprehensive Metabolic Panel; Future  -     Hemoglobin A1c; Future    4. Post-surgical hypothyroidism  -     Comprehensive Metabolic Panel; Future  -     Hemoglobin A1c; Future    5. Gastroesophageal reflux disease without esophagitis  -     Comprehensive Metabolic Panel; Future  -     Hemoglobin A1c; Future    6. Recurrent major depressive disorder, in full remission (HCC)  -     Comprehensive Metabolic Panel; Future  -     Hemoglobin A1c; Future    7. Impaired fasting glucose  -     Comprehensive Metabolic Panel; Future  -     Hemoglobin A1c; Future    Other orders  -     amLODIPine-benazepril (LOTREL 5-10) 5-10 MG per capsule; Take 1 capsule by mouth Daily. for blood pressure  Dispense: 90 capsule; Refill: 1  -     FLUoxetine (PROzac) 40 MG capsule; Take 2 capsules by mouth Daily. For depression and anxiety  Dispense: 180 capsule; Refill: 3  -     montelukast (SINGULAIR) 10 MG tablet; Take 1 tablet by mouth Every Night. For allergies  Dispense: 90 tablet; Refill: 3  -     valACYclovir (VALTREX) 1000 MG tablet; TAKE 2 TABLETS BY MOUTH AT ONSET OF A FEVER BLISTER AND REPEAT THIS DOSE ONCE IN 12 HOURS  Dispense: 20 tablet; Refill: 5  -     buPROPion XL (WELLBUTRIN XL) 300 MG 24 hr tablet; Take 1 tablet by mouth Every Morning. For mood  Dispense: 90 tablet; Refill: 3  -     fluconazole (DIFLUCAN) 150 MG tablet; Take 1 tablet by mouth 1 (One) Time for 1 dose.  Dispense: 1 tablet; Refill: 11  -     nitrofurantoin, macrocrystal-monohydrate, (MACROBID) 100 MG capsule; Take 1 capsule by mouth Daily. For prophylaxis PRN  Dispense: 30 capsule; Refill: 5  -     terconazole (TERAZOL 3) 0.8 % vaginal cream; Insert 1 applicator into the vagina Every Night. X 3 days for yeast  Dispense: 20 g; Refill: 5  -     metFORMIN ER (GLUCOPHAGE-XR) 500 MG 24 hr  tablet; 1 p.o. with food x1 week and if GI tolerant increase to 2 p.o. daily for impaired fasting glucose  Dispense: 60 tablet; Refill: 5      Plan, Arlene Quesada, was seen today.  she was seen for HTN and continue medication, Imparied fasting glucose and plan follow up labs, diet, and exercise, Hyperlipidemia and will continue current medication, Hypothyroidism and under the care of specialist, Seasonal allergies and is doing well on their medication PRN and Vitamin D deficiency and supplemented.  Call DR Haro about GERD and had chemical gastritis  VAltrex works PRN fever blister  Sees eye doc----glaucoma stable  Has PRN Macrobid for UTI--works   Forwith --thyroid;--right side thyroidectomy  Did place [facility] her Macrobid for as needed prophylaxis UTI, Valtrex, Terazol as needed yeast infection and Diflucan as needed yeast infection works well  Updating labs in 3 months and will start metformin for impaired fasting glucose and also see if that helps her weight  Follow-up labs 3 months  Cholesterol score was 4% we will continue to watch  stable on Wellbutrin XL and Prozac for anxiety/depression---see me about Xanax

## 2022-09-20 ENCOUNTER — TELEMEDICINE (OUTPATIENT)
Dept: FAMILY MEDICINE CLINIC | Facility: CLINIC | Age: 62
End: 2022-09-20

## 2022-09-20 VITALS — WEIGHT: 172 LBS | BODY MASS INDEX: 30.48 KG/M2 | HEIGHT: 63 IN

## 2022-09-20 DIAGNOSIS — U07.1 COVID-19: Primary | ICD-10-CM

## 2022-09-20 PROCEDURE — 99213 OFFICE O/P EST LOW 20 MIN: CPT | Performed by: NURSE PRACTITIONER

## 2022-09-20 NOTE — PROGRESS NOTES
"Chief Complaint  Nasal Congestion, Headache, Sore Throat, and Covid-19 Home Monitoring Video Visit    Subjective        You have chosen to receive care through a telehealth visit.  Do you consent to use a video/audio connection for your medical care today? Yes    Arlene presents to [facility]  Is a 62-year-old female for video telehealth visit to discuss testing positive for COVID-19 this a.m. 9/20/2022.  Symptom onset was last night 9/19/2022 including nasal congestion, headache, fatigue, sore throat.  She denies any cough or shortness of breath or wheezing.  No abdominal pain no nausea or vomiting.  She has had a couple of episodes of diarrhea.  She is able to eat and keep down fluids.  She denies any fever.  She is not currently taking any over-the-counter medication.  She does have a prescription for Levaquin if needed however does not have any respiratory symptoms at this time.    The following portions of the patient's history were reviewed and updated as appropriate: allergies, current medications, past family history, past medical history, past social history, past surgical history, and problem list    Review of Systems   Constitutional: Positive for fatigue. Negative for chills and fever.   HENT: Positive for congestion and sore throat.    Eyes: Negative for visual disturbance.   Respiratory: Negative for cough, shortness of breath and wheezing.    Cardiovascular: Negative for chest pain, palpitations and leg swelling.   Gastrointestinal: Positive for diarrhea. Negative for abdominal pain, nausea and vomiting.   Skin: Negative for rash.   Neurological: Negative for dizziness.        Objective   Vital Signs: Unable to assess  No temp per patient  Vitals:    09/20/22 0947   Weight: 78 kg (172 lb)   Height: 160 cm (62.99\")   BMI 30.48    BMI is >= 30 and <35. (Class 1 Obesity). The following options were offered after discussion;: weight loss educational material (shared in after " visit summary)     Virtual  Physical Exam  Constitutional:       General: She is not in acute distress.     Appearance: Normal appearance.   Pulmonary:      Effort: Pulmonary effort is normal. No respiratory distress.   Neurological:      Mental Status: She is alert and oriented to person, place, and time.   Psychiatric:         Mood and Affect: Mood normal.          Result Review :     The following data was reviewed by: DAREK Nathan on 09/20/2022:       positive for COVID-19 this a.m. 9/20/2022.    Symptom onset was last night 9/19/2022     Assessment and Plan    Diagnoses and all orders for this visit:    1. COVID-19 (Primary)  Comments:  positive for COVID-19 this a.m. 9/20/2022.    Symptom onset was last night 9/19/2022      Plan:  -Take all medications as prescribed and until completed.  -Covid test was positive on 09/20/2022  -Monitor for fever and take Tylenol as needed.  Drink plenty of fluids and get plenty of rest.  -Use cool-mist humidifier as needed.  -Seek immediate medical attention for fever unrelieved by Tylenol, chest pain, shortness of breath, sharp back pain, or any other worsening signs or symptoms.  -Remain in quarantine until 10 days from symptom onset.  -The patient verbalized understanding of all instructions given today.     I would recommend an over-the-counter vitamin regimen to boost your immune system of the following: Vitamin D3 5,000 IU daily, vitamin C 500-1,000 mg twice daily, Quercetin 250 mg twice daily, Zinc 100 mg/day, and Melatonin 5-10 mg before bedtime.  You can purchase a pulse oximeter at any local pharmacy to monitor your oxygen saturations.  Call 911 if you have shortness of breath, sharp chest pain, sharp pain in your back, or a fever that will not come down by Tylenol      This was an audio and video enabled telemedicine encounter.  Video Visit lasted approx 11 minutes  Follow Up   Return if symptoms worsen or fail to improve.  Patient was given instructions  and counseling regarding her condition or for health maintenance advice. Please see specific information pulled into the AVS if appropriate.

## 2022-10-28 ENCOUNTER — TRANSCRIBE ORDERS (OUTPATIENT)
Dept: ADMINISTRATIVE | Facility: HOSPITAL | Age: 62
End: 2022-10-28

## 2022-10-28 ENCOUNTER — LAB (OUTPATIENT)
Dept: LAB | Facility: HOSPITAL | Age: 62
End: 2022-10-28

## 2022-10-28 DIAGNOSIS — E03.8 ADULT ONSET HYPOTHYROIDISM: ICD-10-CM

## 2022-10-28 DIAGNOSIS — E03.8 ADULT ONSET HYPOTHYROIDISM: Primary | ICD-10-CM

## 2022-10-28 LAB
T3FREE SERPL-MCNC: 2.61 PG/ML (ref 2–4.4)
T4 FREE SERPL-MCNC: 0.82 NG/DL (ref 0.93–1.7)
TSH SERPL DL<=0.05 MIU/L-ACNC: 2.31 UIU/ML (ref 0.27–4.2)

## 2022-10-28 PROCEDURE — 84443 ASSAY THYROID STIM HORMONE: CPT

## 2022-10-28 PROCEDURE — 84481 FREE ASSAY (FT-3): CPT

## 2022-10-28 PROCEDURE — 36415 COLL VENOUS BLD VENIPUNCTURE: CPT

## 2022-10-28 PROCEDURE — 84439 ASSAY OF FREE THYROXINE: CPT

## 2022-12-13 ENCOUNTER — LAB (OUTPATIENT)
Dept: LAB | Facility: HOSPITAL | Age: 62
End: 2022-12-13

## 2022-12-13 PROCEDURE — 80053 COMPREHEN METABOLIC PANEL: CPT | Performed by: PHYSICIAN ASSISTANT

## 2022-12-13 PROCEDURE — 83036 HEMOGLOBIN GLYCOSYLATED A1C: CPT | Performed by: PHYSICIAN ASSISTANT

## 2022-12-15 ENCOUNTER — OFFICE VISIT (OUTPATIENT)
Dept: FAMILY MEDICINE CLINIC | Facility: CLINIC | Age: 62
End: 2022-12-15

## 2022-12-15 VITALS
OXYGEN SATURATION: 96 % | DIASTOLIC BLOOD PRESSURE: 79 MMHG | HEIGHT: 63 IN | WEIGHT: 175 LBS | HEART RATE: 85 BPM | TEMPERATURE: 97.5 F | BODY MASS INDEX: 31.01 KG/M2 | SYSTOLIC BLOOD PRESSURE: 121 MMHG | RESPIRATION RATE: 16 BRPM

## 2022-12-15 DIAGNOSIS — I10 HYPERTENSION, ESSENTIAL, BENIGN: Primary | ICD-10-CM

## 2022-12-15 DIAGNOSIS — E66.9 OBESITY (BMI 30-39.9): ICD-10-CM

## 2022-12-15 DIAGNOSIS — K21.00 GASTROESOPHAGEAL REFLUX DISEASE WITH ESOPHAGITIS WITHOUT HEMORRHAGE: ICD-10-CM

## 2022-12-15 DIAGNOSIS — R73.01 IMPAIRED FASTING GLUCOSE: ICD-10-CM

## 2022-12-15 PROCEDURE — 99214 OFFICE O/P EST MOD 30 MIN: CPT | Performed by: PHYSICIAN ASSISTANT

## 2022-12-15 NOTE — PROGRESS NOTES
"Subjective   Arlene Quesada is a 62 y.o. female.     History of Present Illness      Since the last visit, she has overall felt fairly well.  She has Primary Hypertension and well controlled on current medication, GERD controlled on PPI Rx, Hyperlipidemia and working on this with diet and exercise and Hypothyroidism and remains under the care of their specialist.  she has been compliant with current medications have reviewed them.  The patient denies medication side effects.  Will refill medications. /79 (BP Location: Left arm, Patient Position: Sitting, Cuff Size: Adult)   Pulse 85   Temp 97.5 °F (36.4 °C) (Oral)   Resp 16   Ht 160 cm (62.99\")   Wt 79.4 kg (175 lb)   LMP  (LMP Unknown)   SpO2 96%   BMI 31.01 kg/m²   No fam hx MEN or thyroid cancer  Wants to try Saxenda-----then WEgpvy  Results for orders placed or performed in visit on 12/10/22   Comprehensive Metabolic Panel    Specimen: Blood   Result Value Ref Range    Glucose 96 65 - 99 mg/dL    BUN 9 8 - 23 mg/dL    Creatinine 0.75 0.57 - 1.00 mg/dL    Sodium 143 136 - 145 mmol/L    Potassium 3.8 3.5 - 5.2 mmol/L    Chloride 105 98 - 107 mmol/L    CO2 27.8 22.0 - 29.0 mmol/L    Calcium 9.1 8.6 - 10.5 mg/dL    Total Protein 6.9 6.0 - 8.5 g/dL    Albumin 4.00 3.50 - 5.20 g/dL    ALT (SGPT) 20 1 - 33 U/L    AST (SGOT) 19 1 - 32 U/L    Alkaline Phosphatase 91 39 - 117 U/L    Total Bilirubin <0.2 0.0 - 1.2 mg/dL    Globulin 2.9 gm/dL    A/G Ratio 1.4 g/dL    BUN/Creatinine Ratio 12.0 7.0 - 25.0    Anion Gap 10.2 5.0 - 15.0 mmol/L    eGFR 90.7 >60.0 mL/min/1.73   Hemoglobin A1c    Specimen: Blood   Result Value Ref Range    Hemoglobin A1C 5.30 4.80 - 5.60 %       A1C was 5.7and now 5.3%  Brother DM     Saw GYN----3-28-22  Stable on Wellbutrin XL and Prozac for anxiety/depression  Dr Schmid --thyroid;--right side thyroidectomy  She has GERD on Nexium---refer to GI---Dr Haro  Has PRN Macrobid for UTI--works     Did CT head d/t some memory changes---small " vessel disease;  Will work on stimulating mind first---consider neuropsychology teresa  Had EGD 12-2-21---Dr Haro----still has GERD  VAltrex works PRN fever blister  Sees eye doc----glaucoma stable  The following portions of the patient's history were reviewed and updated as appropriate: allergies, current medications, past family history, past medical history, past social history, past surgical history and problem list.    Review of Systems   Constitutional: Negative for activity change, appetite change and unexpected weight change.   HENT: Negative for nosebleeds and trouble swallowing.    Eyes: Negative for pain and visual disturbance.   Respiratory: Negative for chest tightness, shortness of breath and wheezing.    Cardiovascular: Negative for chest pain and palpitations.   Gastrointestinal: Negative for abdominal pain and blood in stool.   Endocrine: Negative.    Genitourinary: Negative for difficulty urinating and hematuria.   Musculoskeletal: Negative for joint swelling.   Skin: Negative for color change and rash.   Allergic/Immunologic: Negative.    Neurological: Negative for syncope and speech difficulty.   Hematological: Negative for adenopathy.   Psychiatric/Behavioral: Negative for agitation and confusion.   All other systems reviewed and are negative.      Objective   Physical Exam  Vitals and nursing note reviewed.   Constitutional:       General: She is not in acute distress.     Appearance: She is well-developed. She is obese. She is not ill-appearing or toxic-appearing.   HENT:      Head: Normocephalic.      Right Ear: External ear normal.      Left Ear: External ear normal.      Nose: Nose normal.      Mouth/Throat:      Pharynx: Oropharynx is clear.   Eyes:      General: No scleral icterus.     Conjunctiva/sclera: Conjunctivae normal.      Pupils: Pupils are equal, round, and reactive to light.   Neck:      Thyroid: No thyromegaly.      Vascular: No carotid bruit.   Cardiovascular:      Rate and  Rhythm: Normal rate and regular rhythm.      Heart sounds: Normal heart sounds. No murmur heard.  Pulmonary:      Effort: Pulmonary effort is normal. No respiratory distress.      Breath sounds: Normal breath sounds.   Musculoskeletal:         General: No deformity. Normal range of motion.      Cervical back: Normal range of motion and neck supple.   Skin:     General: Skin is warm and dry.      Findings: No rash.   Neurological:      General: No focal deficit present.      Mental Status: She is alert and oriented to person, place, and time. Mental status is at baseline.   Psychiatric:         Mood and Affect: Mood normal.         Behavior: Behavior normal.         Thought Content: Thought content normal.         Judgment: Judgment normal.         Assessment & Plan   Diagnoses and all orders for this visit:    1. Hypertension, essential, benign (Primary)  -     Liraglutide (SAXENDA) 18 MG/3ML injection pen; Inject 0.6mg under skin daily for week one, THEN 1.2mg daily for week two, THEN 1.8mg daily for week three, then 2.4mg daily for week four.  Dispense: 15 mL; Refill: 1  -     Liraglutide (SAXENDA) 18 MG/3ML injection pen; Inject 3 mg under the skin into the appropriate area as directed Daily.  Dispense: 15 mL; Refill: 5  -     Insulin Pen Needle 32G X 4 MM misc; For once daily use with daily injection  Dispense: 100 each; Refill: 3    2. Impaired fasting glucose  -     Liraglutide (SAXENDA) 18 MG/3ML injection pen; Inject 0.6mg under skin daily for week one, THEN 1.2mg daily for week two, THEN 1.8mg daily for week three, then 2.4mg daily for week four.  Dispense: 15 mL; Refill: 1  -     Liraglutide (SAXENDA) 18 MG/3ML injection pen; Inject 3 mg under the skin into the appropriate area as directed Daily.  Dispense: 15 mL; Refill: 5  -     Insulin Pen Needle 32G X 4 MM misc; For once daily use with daily injection  Dispense: 100 each; Refill: 3    3. Obesity (BMI 30-39.9)  -     Liraglutide (SAXENDA) 18 MG/3ML  injection pen; Inject 0.6mg under skin daily for week one, THEN 1.2mg daily for week two, THEN 1.8mg daily for week three, then 2.4mg daily for week four.  Dispense: 15 mL; Refill: 1  -     Liraglutide (SAXENDA) 18 MG/3ML injection pen; Inject 3 mg under the skin into the appropriate area as directed Daily.  Dispense: 15 mL; Refill: 5  -     Insulin Pen Needle 32G X 4 MM misc; For once daily use with daily injection  Dispense: 100 each; Refill: 3      Ok try Berberine with compound pharmacy if cannot get Saxenda    Sees eye doc----glaucoma stable  Has PRN Macrobid for UTI--works  Dr Schmid --thyroid;--right side thyroidectomy  Plan, Arlene Quesada, was seen today.  she was seen for HTN and continue medication, Imparied fasting glucose and plan follow up labs, diet, and exercise, Hypothyroidism and under the care of specialist and Seasonal allergies and is doing well on their medication PRN.  Ok start Saxenda  Keep Metformin at once daily  Try Nexium 40mg and 20mg---see GI

## 2022-12-30 RX ORDER — VALACYCLOVIR HYDROCHLORIDE 1 G/1
TABLET, FILM COATED ORAL
Qty: 20 TABLET | Refills: 5 | Status: SHIPPED | OUTPATIENT
Start: 2022-12-30

## 2023-02-13 ENCOUNTER — TRANSCRIBE ORDERS (OUTPATIENT)
Dept: ADMINISTRATIVE | Facility: HOSPITAL | Age: 63
End: 2023-02-13
Payer: COMMERCIAL

## 2023-02-13 ENCOUNTER — LAB (OUTPATIENT)
Dept: LAB | Facility: HOSPITAL | Age: 63
End: 2023-02-13
Payer: COMMERCIAL

## 2023-02-13 DIAGNOSIS — E03.8 SECONDARY HYPOTHYROIDISM: ICD-10-CM

## 2023-02-13 DIAGNOSIS — E03.8 SECONDARY HYPOTHYROIDISM: Primary | ICD-10-CM

## 2023-02-13 LAB
T3 SERPL-MCNC: 138 NG/DL (ref 80–200)
T4 FREE SERPL-MCNC: 0.88 NG/DL (ref 0.93–1.7)
TSH SERPL DL<=0.05 MIU/L-ACNC: 0.47 UIU/ML (ref 0.27–4.2)

## 2023-02-13 PROCEDURE — 84480 ASSAY TRIIODOTHYRONINE (T3): CPT

## 2023-02-13 PROCEDURE — 84439 ASSAY OF FREE THYROXINE: CPT

## 2023-02-13 PROCEDURE — 36415 COLL VENOUS BLD VENIPUNCTURE: CPT

## 2023-02-13 PROCEDURE — 84443 ASSAY THYROID STIM HORMONE: CPT

## 2023-02-27 RX ORDER — AMLODIPINE BESYLATE AND BENAZEPRIL HYDROCHLORIDE 5; 10 MG/1; MG/1
CAPSULE ORAL
Qty: 90 CAPSULE | Refills: 0 | Status: SHIPPED | OUTPATIENT
Start: 2023-02-27

## 2023-04-06 ENCOUNTER — OFFICE VISIT (OUTPATIENT)
Dept: GASTROENTEROLOGY | Facility: CLINIC | Age: 63
End: 2023-04-06
Payer: COMMERCIAL

## 2023-04-06 VITALS
OXYGEN SATURATION: 93 % | BODY MASS INDEX: 31.25 KG/M2 | TEMPERATURE: 97.2 F | HEART RATE: 75 BPM | SYSTOLIC BLOOD PRESSURE: 121 MMHG | WEIGHT: 176.4 LBS | HEIGHT: 63 IN | DIASTOLIC BLOOD PRESSURE: 79 MMHG

## 2023-04-06 DIAGNOSIS — R10.13 DYSPEPSIA: Chronic | ICD-10-CM

## 2023-04-06 DIAGNOSIS — R12 HEARTBURN: Primary | Chronic | ICD-10-CM

## 2023-04-06 PROCEDURE — 99204 OFFICE O/P NEW MOD 45 MIN: CPT | Performed by: INTERNAL MEDICINE

## 2023-04-06 RX ORDER — ALBUTEROL SULFATE 90 UG/1
2 AEROSOL, METERED RESPIRATORY (INHALATION) EVERY 4 HOURS PRN
COMMUNITY
Start: 2023-01-26

## 2023-04-06 RX ORDER — HYOSCYAMINE SULFATE 0.125 MG
0.12 TABLET ORAL EVERY 6 HOURS PRN
Qty: 60 TABLET | Refills: 0 | Status: SHIPPED | OUTPATIENT
Start: 2023-04-06

## 2023-04-06 NOTE — PROGRESS NOTES
Chief Complaint   Patient presents with   • Heartburn       Subjective     HPI    Arlene Quesada is a 62 y.o. female with a past medical history noted below who presents for heartburn.  This has been a chronic issue.  She is on nexium 40mg every morning but reports frequent breakthrough symptoms about twice weekly.  Describes a burning type pain, hard to describe, but not feeling reflux per se.  Takes multiple tums during these episodes and finally feels better when she belches.  Symptoms worsened after eating a certain breakfast bar so she cut this out.    12/2/2021 EGD with Dr. Haro notable for a hiatal hernia, gastritis with biopsies confirming gastropathy.    Mother with renal cancer, no GI  Malignancies.    Hx of susanna and appy.    No smoking, no excess ETOH     Works a desk job    Last colonoscopy within 10 years, no polyps    Today's visit was in the office.  Both the patient and I were wearing face masks and proper hand hygiene was performed before and after the physical exam.           Current Outpatient Medications:   •  amLODIPine-benazepril (LOTREL 5-10) 5-10 MG per capsule, TAKE 1 CAPSULE DAILY FOR   BLOOD PRESSURE, Disp: 90 capsule, Rfl: 0  •  ARMOUR THYROID 15 MG tablet, , Disp: , Rfl:   •  bimatoprost (LUMIGAN) 0.01 % ophthalmic drops, 1 drop Every Night. Instill 1 in affected eye once a day (at bedtime), Disp: , Rfl:   •  buPROPion XL (WELLBUTRIN XL) 300 MG 24 hr tablet, Take 1 tablet by mouth Every Morning. For mood, Disp: 90 tablet, Rfl: 3  •  Calcium Carbonate-Vit D-Min (CALCIUM 1200) 5339-7112 MG-UNIT chewable tablet, Chew., Disp: , Rfl:   •  Cholecalciferol (VITAMIN D3) 2000 UNITS tablet, Take by mouth. Take 1 capsule by oral route daily for 90 days, Disp: , Rfl:   •  esomeprazole (nexIUM) 20 MG capsule, Take 1 capsule by mouth 2 (Two) Times a Day. Take 1 capsule (20 mg) by oral route twice daily at least 1 hour before a meal for 4 weeks swallowing whole.  Do not crush or chew granules., Disp: ,  Rfl:   •  FLUoxetine (PROzac) 40 MG capsule, Take 2 capsules by mouth Daily. For depression and anxiety, Disp: 180 capsule, Rfl: 3  •  fluticasone (FLONASE) 50 MCG/ACT nasal spray, 2 sprays into each nostril Daily. Administer 2 sprays in each nostril for each dose. For allergies, Disp: 1 each, Rfl: 11  •  LORazepam (ATIVAN) 0.5 MG tablet, , Disp: , Rfl:   •  metFORMIN ER (GLUCOPHAGE-XR) 500 MG 24 hr tablet, 1 p.o. with food x1 week and if GI tolerant increase to 2 p.o. daily for impaired fasting glucose, Disp: 60 tablet, Rfl: 5  •  montelukast (SINGULAIR) 10 MG tablet, Take 1 tablet by mouth Every Night. For allergies, Disp: 90 tablet, Rfl: 3  •  NP Thyroid 60 MG tablet, 1.5 tablets., Disp: , Rfl:   •  Testosterone Propionate (FIRST-TESTOSTERONE) 2 % ointment, Place  on the skin as directed by provider., Disp: , Rfl:   •  timolol (TIMOPTIC) 0.5 % ophthalmic solution, INT 1 GTT IN OU BID, Disp: , Rfl:   •  valACYclovir (VALTREX) 1000 MG tablet, TAKE 2 TABLETS BY MOUTH AT ONSET FOR FEVER BLISTER. REPEAT THIS DOSE 1 TIME IN 12 HOURS, Disp: 20 tablet, Rfl: 5  •  XIIDRA 5 % solution, , Disp: , Rfl:   •  albuterol sulfate  (90 Base) MCG/ACT inhaler, Inhale 2 puffs Every 4 (Four) Hours As Needed., Disp: , Rfl:   •  Glucosamine HCl-MSM (GLUCOSAMINE-MSM) 1500-500 MG/30ML liquid, Take  by mouth. (Patient not taking: Reported on 4/6/2023), Disp: , Rfl:   •  hyoscyamine (ANASPAZ,LEVSIN) 0.125 MG tablet, Take 1 tablet by mouth Every 6 (Six) Hours As Needed (dyspepsia)., Disp: 60 tablet, Rfl: 0  •  Insulin Pen Needle 32G X 4 MM misc, For once daily use with daily injection (Patient not taking: Reported on 4/6/2023), Disp: 100 each, Rfl: 3  •  levoFLOXacin (LEVAQUIN) 500 MG tablet, TAKE 1 TABLET BY MOUTH DAILY FOR INFECTION (Patient not taking: Reported on 4/6/2023), Disp: 10 tablet, Rfl: 0  •  lidocaine (LIDODERM) 5 %, Place 2 patches on the skin Daily. Remove & Discard patch within 12 hours or as directed by MD (Patient  not taking: Reported on 4/6/2023), Disp: 30 patch, Rfl: 3  •  Liraglutide (SAXENDA) 18 MG/3ML injection pen, Inject 0.6mg under skin daily for week one, THEN 1.2mg daily for week two, THEN 1.8mg daily for week three, then 2.4mg daily for week four. (Patient not taking: Reported on 4/6/2023), Disp: 15 mL, Rfl: 1  •  Liraglutide (SAXENDA) 18 MG/3ML injection pen, Inject 3 mg under the skin into the appropriate area as directed Daily. (Patient not taking: Reported on 4/6/2023), Disp: 15 mL, Rfl: 5  •  metaxalone (SKELAXIN) 800 MG tablet, Take 1 tablet by mouth 2 (Two) Times a Day As Needed for Muscle Spasms. (Patient not taking: Reported on 4/6/2023), Disp: 60 tablet, Rfl: 1  •  mupirocin (Bactroban) 2 % ointment, Apply  topically to the appropriate area as directed 3 (Three) Times a Day. To wound area until healed (Patient not taking: Reported on 4/6/2023), Disp: 30 each, Rfl: 1  •  nitrofurantoin, macrocrystal-monohydrate, (MACROBID) 100 MG capsule, Take 1 capsule by mouth Daily. For prophylaxis PRN (Patient not taking: Reported on 4/6/2023), Disp: 30 capsule, Rfl: 5  •  norethindrone-ethinyl estradiol (FEMHRT 1/5) 1-5 MG-MCG tablet, Take  by mouth Daily. (Patient not taking: Reported on 4/6/2023), Disp: , Rfl:   •  terconazole (TERAZOL 3) 0.8 % vaginal cream, Insert 1 applicator into the vagina Every Night. X 3 days for yeast (Patient not taking: Reported on 4/6/2023), Disp: 20 g, Rfl: 5  •  tobramycin-dexamethasone (TOBRADEX) 0.3-0.1 % ophthalmic suspension, , Disp: , Rfl:       Objective     Vitals:    04/06/23 0941   BP: 121/79   Pulse: 75   Temp: 97.2 °F (36.2 °C)   SpO2: 93%         04/06/23 0941   Weight: 80 kg (176 lb 6.4 oz)     Body mass index is 31.26 kg/m².    Physical Exam  Constitutional:       General: She is not in acute distress.  Pulmonary:      Effort: Pulmonary effort is normal.   Neurological:      Mental Status: She is alert and oriented to person, place, and time.   Psychiatric:         Mood  and Affect: Mood normal.         Behavior: Behavior normal.         Thought Content: Thought content normal.         Judgment: Judgment normal.             WBC   Date Value Ref Range Status   06/08/2021 9.96 3.40 - 10.80 10*3/mm3 Final     RBC   Date Value Ref Range Status   06/08/2021 4.24 3.77 - 5.28 10*6/mm3 Final     Hemoglobin   Date Value Ref Range Status   06/08/2021 12.6 12.0 - 15.9 g/dL Final   06/30/2020 12.9 12.0 - 15.9 g/dL Final     Hematocrit   Date Value Ref Range Status   06/08/2021 38.8 34.0 - 46.6 % Final   06/30/2020 38.4 34.0 - 46.6 % Final     MCV   Date Value Ref Range Status   06/08/2021 91.5 79.0 - 97.0 fL Final   06/30/2020 89.1 79.0 - 97.0 fL Final     MCH   Date Value Ref Range Status   06/08/2021 29.7 26.6 - 33.0 pg Final   06/30/2020 29.9 26.6 - 33.0 pg Final     MCHC   Date Value Ref Range Status   06/08/2021 32.5 31.5 - 35.7 g/dL Final   06/30/2020 33.6 31.5 - 35.7 g/dL Final     RDW   Date Value Ref Range Status   06/08/2021 13.1 12.3 - 15.4 % Final   06/30/2020 13.0 12.3 - 15.4 % Final     RDW-SD   Date Value Ref Range Status   06/30/2020 41.9 37.0 - 54.0 fl Final     MPV   Date Value Ref Range Status   06/30/2020 9.5 6.0 - 12.0 fL Final     Platelets   Date Value Ref Range Status   06/08/2021 488 (H) 140 - 450 10*3/mm3 Final   06/30/2020 421 140 - 450 10*3/mm3 Final     Neutrophil Rel %   Date Value Ref Range Status   06/08/2021 66.1 42.7 - 76.0 % Final     Neutrophil %   Date Value Ref Range Status   06/30/2020 63.5 42.7 - 76.0 % Final     Lymphocyte Rel %   Date Value Ref Range Status   06/08/2021 24.4 19.6 - 45.3 % Final     Lymphocyte %   Date Value Ref Range Status   06/30/2020 26.5 19.6 - 45.3 % Final     Monocyte Rel %   Date Value Ref Range Status   06/08/2021 6.2 5.0 - 12.0 % Final     Monocyte %   Date Value Ref Range Status   06/30/2020 5.7 5.0 - 12.0 % Final     Eosinophil Rel %   Date Value Ref Range Status   06/08/2021 2.6 0.3 - 6.2 % Final     Eosinophil %   Date  Value Ref Range Status   06/30/2020 3.3 0.3 - 6.2 % Final     Basophil Rel %   Date Value Ref Range Status   06/08/2021 0.4 0.0 - 1.5 % Final     Basophil %   Date Value Ref Range Status   06/30/2020 0.4 0.0 - 1.5 % Final     Immature Grans %   Date Value Ref Range Status   06/30/2020 0.6 (H) 0.0 - 0.5 % Final     Neutrophils Absolute   Date Value Ref Range Status   06/08/2021 6.58 1.70 - 7.00 10*3/mm3 Final     Neutrophils, Absolute   Date Value Ref Range Status   06/30/2020 6.12 1.70 - 7.00 10*3/mm3 Final     Lymphocytes Absolute   Date Value Ref Range Status   06/08/2021 2.43 0.70 - 3.10 10*3/mm3 Final     Lymphocytes, Absolute   Date Value Ref Range Status   06/30/2020 2.55 0.70 - 3.10 10*3/mm3 Final     Monocytes Absolute   Date Value Ref Range Status   06/08/2021 0.62 0.10 - 0.90 10*3/mm3 Final     Monocytes, Absolute   Date Value Ref Range Status   06/30/2020 0.55 0.10 - 0.90 10*3/mm3 Final     Eosinophils Absolute   Date Value Ref Range Status   06/08/2021 0.26 0.00 - 0.40 10*3/mm3 Final     Eosinophils, Absolute   Date Value Ref Range Status   06/30/2020 0.32 0.00 - 0.40 10*3/mm3 Final     Basophils Absolute   Date Value Ref Range Status   06/08/2021 0.04 0.00 - 0.20 10*3/mm3 Final     Basophils, Absolute   Date Value Ref Range Status   06/30/2020 0.04 0.00 - 0.20 10*3/mm3 Final     Immature Grans, Absolute   Date Value Ref Range Status   06/30/2020 0.06 (H) 0.00 - 0.05 10*3/mm3 Final     nRBC   Date Value Ref Range Status   06/08/2021 0.0 0.0 - 0.2 /100 WBC Final   06/30/2020 0.0 0.0 - 0.2 /100 WBC Final       Lab Results   Component Value Date    GLUCOSE 96 12/13/2022    BUN 9 12/13/2022    CREATININE 0.75 12/13/2022    EGFRIFNONA 80 06/08/2021    EGFRIFAFRI 97 06/08/2021    BCR 12.0 12/13/2022    CO2 27.8 12/13/2022    CALCIUM 9.1 12/13/2022    PROTENTOTREF 7.3 06/08/2021    ALBUMIN 4.00 12/13/2022    LABIL2 1.8 06/08/2021    AST 19 12/13/2022    ALT 20 12/13/2022         Imaging Results (Last 7 Days)      ** No results found for the last 168 hours. **          I personally reviewed data as detailed below:     The labs listed above.      Office notes from: 12/15/22 pcp    Endoscopy procedures and pathology from: 12/2/21 EGD with Dr Haro    Assessment and Plan  1.  Heartburn/dyspepsia: Reassuring recent EGD.  I am not certain that she is having reflux.  Perhaps esophageal spasm    Plan  Esophagram  Hyoscyamine as needed for the painful episodes (TCA contraindicated with fluoxetine use)  Further recommendations after esophagram       Diagnoses and all orders for this visit:    1. Heartburn (Primary)  -     FL Esophagram Complete Double-Contrast; Future    2. Dyspepsia  -     FL Esophagram Complete Double-Contrast; Future    Other orders  -     hyoscyamine (ANASPAZ,LEVSIN) 0.125 MG tablet; Take 1 tablet by mouth Every 6 (Six) Hours As Needed (dyspepsia).  Dispense: 60 tablet; Refill: 0          I have discussed the above plan with the patient.  They verbalize understanding and are in agreement with the plan.  They have been advised to contact the office for any questions, concerns, or changes related to their health.    Dictated utilizing Dragon dictation

## 2023-05-24 ENCOUNTER — PATIENT MESSAGE (OUTPATIENT)
Dept: FAMILY MEDICINE CLINIC | Facility: CLINIC | Age: 63
End: 2023-05-24
Payer: COMMERCIAL

## 2023-05-24 RX ORDER — METFORMIN HYDROCHLORIDE 500 MG/1
TABLET, EXTENDED RELEASE ORAL
Qty: 60 TABLET | Refills: 5 | Status: SHIPPED | OUTPATIENT
Start: 2023-05-24

## 2023-05-24 RX ORDER — METFORMIN HYDROCHLORIDE 500 MG/1
TABLET, EXTENDED RELEASE ORAL
Qty: 60 TABLET | Refills: 5 | Status: SHIPPED | OUTPATIENT
Start: 2023-05-24 | End: 2023-05-24 | Stop reason: SDUPTHER

## 2023-05-24 NOTE — TELEPHONE ENCOUNTER
From: Arlene Quesada  To: Dasia Glez  Sent: 5/24/2023 9:52 AM EDT  Subject: Metformin Refill    I called [facility] to get refill & advised you canceled the RX. Why, I am still taking 1 at night after meal.

## 2023-05-27 RX ORDER — AMLODIPINE BESYLATE AND BENAZEPRIL HYDROCHLORIDE 5; 10 MG/1; MG/1
CAPSULE ORAL
Qty: 90 CAPSULE | Refills: 0 | Status: SHIPPED | OUTPATIENT
Start: 2023-05-27

## 2023-06-08 ENCOUNTER — OFFICE VISIT (OUTPATIENT)
Dept: FAMILY MEDICINE CLINIC | Facility: CLINIC | Age: 63
End: 2023-06-08
Payer: COMMERCIAL

## 2023-06-08 VITALS
TEMPERATURE: 97.4 F | RESPIRATION RATE: 16 BRPM | HEART RATE: 75 BPM | WEIGHT: 174 LBS | OXYGEN SATURATION: 97 % | SYSTOLIC BLOOD PRESSURE: 122 MMHG | BODY MASS INDEX: 30.83 KG/M2 | DIASTOLIC BLOOD PRESSURE: 72 MMHG | HEIGHT: 63 IN

## 2023-06-08 DIAGNOSIS — R73.01 IMPAIRED FASTING GLUCOSE: ICD-10-CM

## 2023-06-08 DIAGNOSIS — E66.9 OBESITY (BMI 30-39.9): ICD-10-CM

## 2023-06-08 DIAGNOSIS — K21.00 GASTROESOPHAGEAL REFLUX DISEASE WITH ESOPHAGITIS WITHOUT HEMORRHAGE: ICD-10-CM

## 2023-06-08 DIAGNOSIS — E89.0 POSTSURGICAL HYPOTHYROIDISM: ICD-10-CM

## 2023-06-08 DIAGNOSIS — F41.1 GENERALIZED ANXIETY DISORDER: ICD-10-CM

## 2023-06-08 DIAGNOSIS — E78.2 MIXED HYPERLIPIDEMIA: ICD-10-CM

## 2023-06-08 DIAGNOSIS — F32.5 DEPRESSION, MAJOR, IN REMISSION: ICD-10-CM

## 2023-06-08 DIAGNOSIS — I10 HYPERTENSION, ESSENTIAL, BENIGN: Primary | ICD-10-CM

## 2023-06-08 PROCEDURE — 99214 OFFICE O/P EST MOD 30 MIN: CPT | Performed by: PHYSICIAN ASSISTANT

## 2023-06-08 RX ORDER — SEMAGLUTIDE 0.25 MG/.5ML
0.25 INJECTION, SOLUTION SUBCUTANEOUS WEEKLY
Qty: 2 ML | Refills: 0 | Status: SHIPPED | OUTPATIENT
Start: 2023-06-08

## 2023-06-08 RX ORDER — FLUOXETINE HYDROCHLORIDE 40 MG/1
80 CAPSULE ORAL DAILY
Qty: 180 CAPSULE | Refills: 3 | Status: SHIPPED | OUTPATIENT
Start: 2023-06-08

## 2023-06-08 RX ORDER — AMLODIPINE BESYLATE AND BENAZEPRIL HYDROCHLORIDE 5; 10 MG/1; MG/1
1 CAPSULE ORAL DAILY
Qty: 90 CAPSULE | Refills: 1 | Status: SHIPPED | OUTPATIENT
Start: 2023-06-08

## 2023-06-08 RX ORDER — NITROFURANTOIN 25; 75 MG/1; MG/1
100 CAPSULE ORAL DAILY
Qty: 30 CAPSULE | Refills: 5 | Status: SHIPPED | OUTPATIENT
Start: 2023-06-08

## 2023-06-08 RX ORDER — BUPROPION HYDROCHLORIDE 300 MG/1
300 TABLET ORAL EVERY MORNING
Qty: 90 TABLET | Refills: 3 | Status: SHIPPED | OUTPATIENT
Start: 2023-06-08

## 2023-06-08 RX ORDER — METFORMIN HYDROCHLORIDE 500 MG/1
TABLET, EXTENDED RELEASE ORAL
Qty: 90 TABLET | Refills: 3 | Status: SHIPPED | OUTPATIENT
Start: 2023-06-08

## 2023-06-08 RX ORDER — MONTELUKAST SODIUM 10 MG/1
10 TABLET ORAL NIGHTLY
Qty: 90 TABLET | Refills: 3 | Status: SHIPPED | OUTPATIENT
Start: 2023-06-08

## 2023-06-08 NOTE — PROGRESS NOTES
"Subjective   Arlene Quesada is a 63 y.o. female.     History of Present Illness    Since the last visit, she has overall felt fairly well.  She has Primary Hypertension and well controlled on current medication, Impaired fasting glucose and will monitor labs to watch for DMII, Hyperlipidemia and working on this with diet and exercise, Hypothyroidism and remains under the care of their specialist, Seasonal allergies and doing well on their medication , and Vitamin D deficiency and will update labs for continued management.  she has been compliant with current medications have reviewed them.  The patient denies medication side effects.  Will refill medications. /72   Pulse 75   Temp 97.4 °F (36.3 °C)   Resp 16   Ht 160 cm (62.99\")   Wt 78.9 kg (174 lb)   LMP  (LMP Unknown)   SpO2 97%   BMI 30.83 kg/m²   Lab Results   Component Value Date    GLUCOSE 96 12/13/2022    BUN 9 12/13/2022    CREATININE 0.75 12/13/2022    EGFR 90.7 12/13/2022    BCR 12.0 12/13/2022    K 3.8 12/13/2022    CO2 27.8 12/13/2022    CALCIUM 9.1 12/13/2022    PROTENTOTREF 7.3 06/08/2021    ALBUMIN 4.00 12/13/2022    BILITOT <0.2 12/13/2022    AST 19 12/13/2022    ALT 20 12/13/2022     Lab Results   Component Value Date    HGBA1C 5.30 12/13/2022       Results for orders placed or performed in visit on 02/13/23   T3    Specimen: Blood   Result Value Ref Range    T3, Total 138.0 80.0 - 200.0 ng/dl   T4, Free    Specimen: Blood   Result Value Ref Range    Free T4 0.88 (L) 0.93 - 1.70 ng/dL   TSH    Specimen: Blood   Result Value Ref Range    TSH 0.471 0.270 - 4.200 uIU/mL   No fam hx MEN or thyroid cancer  Wants to try Saxenda-----then Wegovy--- she is already on metformin.  On Macrobid as needed UTI works well  Brother DM   Patient has obesity with BMI at 30 noting she has comorbid factors of primary hypertension, impaired fasting glucose and is already on metformin, mixed hyperlipidemia, GERD not controlled weight loss would help all " "these conditions and will send Wegovy to pharmacy  Plan to increase dose after 1 month  Small frequent meals and will stop if makes GERD worse  Saw GYN----saw DR Irby----to have mammo  Stable on Wellbutrin XL and Prozac for anxiety/depression  Dr Schmid --thyroid;--right side thyroidectomy--- last labs were 2/13/2023 through ENT  She has GERD on Nexium--- she saw Dr. Tanner on 4/6/2023 and has esophagram scheduled--- for breakthrough GERD  Has PRN Macrobid for UTI--works     Did CT 6-25-21 head d/t some memory changes---small vessel disease;  Will work on stimulating mind first---consider neuropsychology teresa  Had EGD 12-2-21---Dr Haro----still has GERD  VAltrex works PRN fever blister  Sees eye doc----glaucoma stable  Arlene Quesada female 63 y.o., /72   Pulse 75   Temp 97.4 °F (36.3 °C)   Resp 16   Ht 160 cm (62.99\")   Wt 78.9 kg (174 lb)   LMP  (LMP Unknown)   SpO2 97%   BMI 30.83 kg/m²   who presents today for follow up of Depression and Anxiety.  She reports medication is working well, patient desires to continue on Rx, and needs refill. Onset of symptoms was approximately several years ago. She denies current suicidal and homicidal ideation. Risk factors are family history of anxiety and or depression and lifestyle of multiple roles.  Previous treatment includes current Rx. She complains of the following medication side effects:none. The patient declines to go to counseling..    The following portions of the patient's history were reviewed and updated as appropriate: allergies, current medications, past family history, past medical history, past social history, past surgical history, and problem list.    Review of Systems   Constitutional:  Negative for diaphoresis.   HENT:  Negative for nosebleeds and trouble swallowing.    Eyes:  Negative for blurred vision and visual disturbance.   Respiratory:  Negative for choking.    Gastrointestinal:  Negative for blood in stool. "   Allergic/Immunologic: Negative for immunocompromised state.   Neurological:  Negative for facial asymmetry and speech difficulty.   Psychiatric/Behavioral:  Negative for dysphoric mood, self-injury and suicidal ideas. The patient is not nervous/anxious.      Objective   Physical Exam  Vitals and nursing note reviewed.   Constitutional:       General: She is not in acute distress.     Appearance: She is well-developed. She is not ill-appearing or toxic-appearing.   HENT:      Head: Normocephalic.      Right Ear: External ear normal.      Left Ear: External ear normal.      Nose: Nose normal.      Mouth/Throat:      Pharynx: Oropharynx is clear.   Eyes:      General: No scleral icterus.     Conjunctiva/sclera: Conjunctivae normal.      Pupils: Pupils are equal, round, and reactive to light.   Neck:      Thyroid: No thyromegaly.      Vascular: No carotid bruit.   Cardiovascular:      Rate and Rhythm: Normal rate and regular rhythm.      Pulses: Normal pulses.      Heart sounds: Normal heart sounds. No murmur heard.  Pulmonary:      Effort: Pulmonary effort is normal. No respiratory distress.      Breath sounds: Normal breath sounds.   Musculoskeletal:         General: No deformity. Normal range of motion.      Cervical back: Normal range of motion and neck supple.      Right lower leg: No edema.      Left lower leg: No edema.   Skin:     General: Skin is warm and dry.      Findings: No rash.   Neurological:      General: No focal deficit present.      Mental Status: She is alert and oriented to person, place, and time. Mental status is at baseline.   Psychiatric:         Mood and Affect: Mood normal.         Behavior: Behavior normal.         Thought Content: Thought content normal.         Judgment: Judgment normal.         Assessment & Plan   Diagnoses and all orders for this visit:    1. Hypertension, essential, benign (Primary)    2. Mixed hyperlipidemia    3. Gastroesophageal reflux disease with esophagitis  without hemorrhage    4. Generalized anxiety disorder    5. Impaired fasting glucose    6. Obesity (BMI 30-39.9)    7. Postsurgical hypothyroidism    8. Depression, major, in remission    Other orders  -     montelukast (SINGULAIR) 10 MG tablet; Take 1 tablet by mouth Every Night. For allergies  Dispense: 90 tablet; Refill: 3  -     amLODIPine-benazepril (LOTREL 5-10) 5-10 MG per capsule; Take 1 capsule by mouth Daily. For BP  Dispense: 90 capsule; Refill: 1  -     buPROPion XL (WELLBUTRIN XL) 300 MG 24 hr tablet; Take 1 tablet by mouth Every Morning. For mood  Dispense: 90 tablet; Refill: 3  -     FLUoxetine (PROzac) 40 MG capsule; Take 2 capsules by mouth Daily. For depression and anxiety  Dispense: 180 capsule; Refill: 3  -     metFORMIN ER (GLUCOPHAGE-XR) 500 MG 24 hr tablet; 1 p.o. with food for impaired fasting glucose  Dispense: 90 tablet; Refill: 3  -     nitrofurantoin, macrocrystal-monohydrate, (MACROBID) 100 MG capsule; Take 1 capsule by mouth Daily. For prophylaxis PRN  Dispense: 30 capsule; Refill: 5  -     Semaglutide-Weight Management (Wegovy) 0.25 MG/0.5ML solution auto-injector; Inject 0.25 mg under the skin into the appropriate area as directed 1 (One) Time Per Week. Inject 0.25 mg SQ weekly x4  Dispense: 2 mL; Refill: 0      Plan, Arlene Quesada, was seen today.  she was seen for HTN and continue medication, Imparied fasting glucose and plan follow up labs, diet, and exercise, Hyperlipidemia and will work on this with diet and exercise, Hypothyroidism and under the care of specialist, Seasonal allergies and is doing well on their medication PRN, and Vitamin D deficiency and will update labs .  Followed by Dr. Schmid ENT history of right thyroidectomy   Continue metformin for treatment of impaired fasting glucose  Followed by ophthalmology for glaucoma and is stable  Under care of GI for GERD  Macrobid for UTI as needed works well  Labs due in August  Continue Prozac and Wellbutrin working well for  anxiety and depression  Continue PPI----sees GI for GERD  GYN does mammogram and Pap  Patient has obesity with BMI at 30 noting she has comorbid factors of primary hypertension, impaired fasting glucose and is already on metformin, mixed hyperlipidemia, GERD not controlled weight loss would help all these conditions and will send Wegovy to pharmacy  Plan to increase dose after 1 month  Small frequent meals and will stop if makes GERD worse

## 2023-06-13 ENCOUNTER — TELEPHONE (OUTPATIENT)
Dept: FAMILY MEDICINE CLINIC | Facility: CLINIC | Age: 63
End: 2023-06-13
Payer: COMMERCIAL

## 2023-06-13 RX ORDER — METFORMIN HYDROCHLORIDE 500 MG/1
TABLET, EXTENDED RELEASE ORAL
Qty: 90 TABLET | Refills: 3 | Status: SHIPPED | OUTPATIENT
Start: 2023-06-13

## 2023-06-13 RX ORDER — METFORMIN HYDROCHLORIDE 500 MG/1
TABLET, EXTENDED RELEASE ORAL
Qty: 90 TABLET | Refills: 3 | Status: SHIPPED | OUTPATIENT
Start: 2023-06-13 | End: 2023-06-13

## 2023-06-13 NOTE — TELEPHONE ENCOUNTER
Caller: [facility] - [place], [place] - [address] - [phone] [facility] [phone] FX    Relationship: Pharmacy    Best call back number: [phone]  REFERENCE NUMBER [phone]    What medications are you currently taking:   Current Outpatient Medications on File Prior to Visit   Medication Sig Dispense Refill    albuterol sulfate  (90 Base) MCG/ACT inhaler Inhale 2 puffs Every 4 (Four) Hours As Needed.      amLODIPine-benazepril (LOTREL 5-10) 5-10 MG per capsule Take 1 capsule by mouth Daily. For BP 90 capsule 1    ARMOUR THYROID 15 MG tablet       bimatoprost (LUMIGAN) 0.01 % ophthalmic drops 1 drop Every Night. Instill 1 in affected eye once a day (at bedtime)      buPROPion XL (WELLBUTRIN XL) 300 MG 24 hr tablet Take 1 tablet by mouth Every Morning. For mood 90 tablet 3    Calcium Carbonate-Vit D-Min (CALCIUM 1200) 1198-6539 MG-UNIT chewable tablet Chew.      Cholecalciferol (VITAMIN D3) 2000 UNITS tablet Take by mouth. Take 1 capsule by oral route daily for 90 days      esomeprazole (nexIUM) 20 MG capsule Take 1 capsule by mouth 2 (Two) Times a Day. Take 1 capsule (20 mg) by oral route twice daily at least 1 hour before a meal for 4 weeks swallowing whole.  Do not crush or chew granules.      FLUoxetine (PROzac) 40 MG capsule Take 2 capsules by mouth Daily. For depression and anxiety 180 capsule 3    Glucosamine HCl-MSM (GLUCOSAMINE-MSM) 1500-500 MG/30ML liquid Take  by mouth.      hyoscyamine (ANASPAZ,LEVSIN) 0.125 MG tablet Take 1 tablet by mouth Every 6 (Six) Hours As Needed (dyspepsia). 60 tablet 0    Insulin Pen Needle 32G X 4 MM misc For once daily use with daily injection 100 each 3    levoFLOXacin (LEVAQUIN) 500 MG tablet TAKE 1 TABLET BY MOUTH DAILY FOR INFECTION 10 tablet 0    lidocaine (LIDODERM) 5 % Place 2 patches on the skin Daily. Remove & Discard patch within 12 hours or as directed by MD 30 patch 3    LORazepam (ATIVAN) 0.5 MG tablet       metaxalone (SKELAXIN) 800 MG  tablet Take 1 tablet by mouth 2 (Two) Times a Day As Needed for Muscle Spasms. 60 tablet 1    metFORMIN ER (GLUCOPHAGE-XR) 500 MG 24 hr tablet 1 p.o. with food for impaired fasting glucose 90 tablet 3    montelukast (SINGULAIR) 10 MG tablet Take 1 tablet by mouth Every Night. For allergies 90 tablet 3    mupirocin (Bactroban) 2 % ointment Apply  topically to the appropriate area as directed 3 (Three) Times a Day. To wound area until healed 30 each 1    nitrofurantoin, macrocrystal-monohydrate, (MACROBID) 100 MG capsule Take 1 capsule by mouth Daily. For prophylaxis PRN 30 capsule 5    norethindrone-ethinyl estradiol (FEMHRT 1/5) 1-5 MG-MCG tablet Take  by mouth Daily.      NP Thyroid 60 MG tablet 1.5 tablets.      Semaglutide-Weight Management (Wegovy) 0.25 MG/0.5ML solution auto-injector Inject 0.25 mg under the skin into the appropriate area as directed 1 (One) Time Per Week. Inject 0.25 mg SQ weekly x4 2 mL 0    terconazole (TERAZOL 3) 0.8 % vaginal cream Insert 1 applicator into the vagina Every Night. X 3 days for yeast 20 g 5    Testosterone Propionate (FIRST-TESTOSTERONE) 2 % ointment Place  on the skin as directed by provider.      timolol (TIMOPTIC) 0.5 % ophthalmic solution INT 1 GTT IN OU BID      tobramycin-dexamethasone (TOBRADEX) 0.3-0.1 % ophthalmic suspension       valACYclovir (VALTREX) 1000 MG tablet TAKE 2 TABLETS BY MOUTH AT ONSET FOR FEVER BLISTER. REPEAT THIS DOSE 1 TIME IN 12 HOURS 20 tablet 5    XIIDRA 5 % solution        No current facility-administered medications on file prior to visit.          Which medication are you concerned about: METFORMIN     What are your concerns: PATIENT'S PHARMACY CALLED AND STATED THAT THERE WAS NOT A FREQUENCY LISTED ON THE MEDICATION BOTTLE. PLEASE INFORM PHARMACY HOW OFTEN THE PATIENT NEEDS TO TAKE THIS MEDICATION (ONE TIME A DAY, ETC). PLEASE ADVISE.

## 2023-06-15 ENCOUNTER — TELEPHONE (OUTPATIENT)
Dept: FAMILY MEDICINE CLINIC | Facility: CLINIC | Age: 63
End: 2023-06-15
Payer: COMMERCIAL

## 2023-06-16 NOTE — TELEPHONE ENCOUNTER
Called [facility] and spoke with Wendy Frost needing to verify directions on Metformin.

## 2023-08-28 ENCOUNTER — TELEPHONE (OUTPATIENT)
Dept: GASTROENTEROLOGY | Facility: CLINIC | Age: 63
End: 2023-08-28

## 2023-08-28 NOTE — TELEPHONE ENCOUNTER
Caller: Arlene Quesada    Relationship to patient: Self    Best call back number: [phone]     Patient is needing: PT CALLED AND WANTED TO KNOW WHERE DR. CALHOUN WENT. PT ASKS THAT IF SOMEONE COULD HER BACK SHE WOULD APPRECIATE IT. CALL BACK ANYTIME OK TO [facility].

## 2023-09-18 NOTE — TELEPHONE ENCOUNTER
Hub staff attempted to follow warm transfer process and was unsuccessful     Caller: Arlene Quesada    Relationship to patient: Self    Best call back number:  [phone]  CELL [phone]    Patient is needing: PATIENT NEEDING TO SCHEDULE SCOPE.  PATIENT STATING THAT SHE HAS FREQUENT HICCUPS AND BELCHING.  SHE SEEN DR CALHOUN BUT NEEDS TO SEE SOMEONE ELSE FOR PROCEDURE.  PLEASE REACH OUT TO SCHEDULE. THANK YOU

## 2023-10-26 ENCOUNTER — OFFICE VISIT (OUTPATIENT)
Dept: GASTROENTEROLOGY | Facility: CLINIC | Age: 63
End: 2023-10-26
Payer: COMMERCIAL

## 2023-10-26 VITALS
DIASTOLIC BLOOD PRESSURE: 82 MMHG | HEIGHT: 63 IN | SYSTOLIC BLOOD PRESSURE: 139 MMHG | WEIGHT: 158.2 LBS | BODY MASS INDEX: 28.03 KG/M2 | OXYGEN SATURATION: 97 % | HEART RATE: 78 BPM | TEMPERATURE: 96.6 F

## 2023-10-26 DIAGNOSIS — R06.6 HICCUPS: ICD-10-CM

## 2023-10-26 DIAGNOSIS — R13.10 DYSPHAGIA, UNSPECIFIED TYPE: ICD-10-CM

## 2023-10-26 DIAGNOSIS — R10.13 DYSPEPSIA: Primary | ICD-10-CM

## 2023-10-26 PROCEDURE — 99214 OFFICE O/P EST MOD 30 MIN: CPT | Performed by: NURSE PRACTITIONER

## 2023-10-26 RX ORDER — PANTOPRAZOLE SODIUM 40 MG/1
40 TABLET, DELAYED RELEASE ORAL 2 TIMES DAILY
Qty: 180 TABLET | Refills: 3 | Status: SHIPPED | OUTPATIENT
Start: 2023-10-26

## 2023-10-26 RX ORDER — LEVOTHYROXINE, LIOTHYRONINE 57; 13.5 UG/1; UG/1
TABLET ORAL
COMMUNITY
Start: 2023-07-28

## 2023-10-26 NOTE — PROGRESS NOTES
Chief Complaint   Patient presents with    Heartburn       HPI    Arlene Quesada is a  63 y.o. female here for a follow up visit for GERD.    Past medical history of arthritis, depression, hyperlipidemia, hypertension, low back pain along with thyroid disease.    This patient will follow with myself and Dr. Rose moving forward she was previously following with Dr. Donohue.    On visit today patient complains of dyspeptic symptoms, postprandial hiccups along with episodic dysphagia.  She ate grapes during our encounter to potentially induce symptoms however she did not have any.  She did swallow grapes without issues.  She has been on Nexium for greater than 10 years possibly longer.  Symptoms despite low-dose twice daily PPI.  Denies nausea, vomiting, or epigastric pain.  She is intentionally trying to lose weight down > 20 pounds on Wegovy.  She was directed to complete an esophagram following last visit with Dr. Donohue but it was not completed.    12/2/2021 EGD with Dr. Haro notable for a hiatal hernia, gastritis with biopsies confirming gastropathy.     Mother with renal cancer, no GI  Malignancies.     Her last colonoscopy was 2016 with diverticulosis. Recall 10 years.      Past Medical History:   Diagnosis Date    Anemia, iron deficiency     Arthritis     Depression     Gastroesophageal reflux     Glaucoma     History of complete eye exam 49595    [facility]: early glaucoma    History of EKG 03/2006    borderline QT prolonged, NSR    History of MRI of cervical spine 03/29/2011    Multilevel DDD with multilevel spinal steonsis, most severe at C5-6 and C6-7, less prominent at C3-4 and C4-5, there is neuroforaminimal compromise on the right at multiple levels    History of MRI of lumbar spine 03/29/2011    Multilevel DDD most prominent at L4-5 and L5-S1, moderate facet DDD, protrusion in L4-5 and mild disc bulge at L5-S1    Hyperlipemia     Hypertension, essential, benign     Low back pain     Nausea 01/23/2015     Reflux esophagitis     Thyroid disorder     Thyroid nodule        Past Surgical History:   Procedure Laterality Date    BREAST BIOPSY  2013    left - benign fibroadenoma    CHOLECYSTECTOMY      COLPOSCOPY      ENDOMETRIAL ABLATION      ENDOSCOPY  2006    Dr. Ruiz: clean based gastric ulcer    HYSTERECTOMY  10/2010    THYROIDECTOMY, PARTIAL Right     UPPER GASTROINTESTINAL ENDOSCOPY         Scheduled Meds:     Continuous Infusions: No current facility-administered medications for this visit.      PRN Meds:     Allergies   Allergen Reactions    Carrot [Daucus Carota] Anaphylaxis    Banana Itching     Throat itching      Augmentin [Amoxicillin-Pot Clavulanate] Diarrhea and GI Intolerance       Social History     Socioeconomic History    Marital status:    Tobacco Use    Smoking status: Former     Types: Cigarettes     Quit date:      Years since quittin.8    Smokeless tobacco: Never    Tobacco comments:     Started at age 19/Stopped at age 45   Substance and Sexual Activity    Alcohol use: Yes     Comment: rare    Drug use: No    Sexual activity: Defer       Family History   Problem Relation Age of Onset    Breast cancer Mother     Kidney cancer Mother     Hypertension Father     Diabetes Brother        Review of Systems   HENT:  Positive for trouble swallowing.        Vitals:    10/26/23 0944   BP: 139/82   Pulse: 78   Temp: 96.6 °F (35.9 °C)   SpO2: 97%       Physical Exam  Constitutional:       Appearance: She is well-developed.   Abdominal:      General: Bowel sounds are normal. There is no distension.      Palpations: Abdomen is soft. There is no mass.      Tenderness: There is no abdominal tenderness. There is no guarding.      Hernia: No hernia is present.   Skin:     General: Skin is warm and dry.      Capillary Refill: Capillary refill takes less than 2 seconds.   Neurological:      Mental Status: She is alert and oriented to person, place, and time.   Psychiatric:          Behavior: Behavior normal.     Assessment    Diagnoses and all orders for this visit:    1. Dyspepsia (Primary)  -     FL Upper GI With Air Contrast; Future    2. Hiccups  -     FL Upper GI With Air Contrast; Future    3. Dysphagia, unspecified type  -     FL Upper GI With Air Contrast; Future    Other orders  -     pantoprazole (PROTONIX) 40 MG EC tablet; Take 1 tablet by mouth 2 (Two) Times a Day.  Dispense: 180 tablet; Refill: 3       Plan    Arrange upper GI series for further evaluation of symptoms  Stop Nexium  Begin Protonix 40 mg p.o. twice daily  Follow an antireflux diet  Further recommendations and follow-up pending imaging         DAREK Carrillo  [facility]  [address] [place] [address]  Office: [phone]

## 2023-12-06 ENCOUNTER — HOSPITAL ENCOUNTER (OUTPATIENT)
Dept: GENERAL RADIOLOGY | Facility: HOSPITAL | Age: 63
Discharge: HOME | End: 2023-12-06
Admitting: NURSE PRACTITIONER
Payer: COMMERCIAL

## 2023-12-06 DIAGNOSIS — R13.10 DYSPHAGIA, UNSPECIFIED TYPE: ICD-10-CM

## 2023-12-06 DIAGNOSIS — R06.6 HICCUPS: ICD-10-CM

## 2023-12-06 DIAGNOSIS — R10.13 DYSPEPSIA: ICD-10-CM

## 2023-12-06 PROCEDURE — 74246 X-RAY XM UPR GI TRC 2CNTRST: CPT

## 2023-12-06 RX ADMIN — BARIUM SULFATE 135 ML: 980 POWDER, FOR SUSPENSION ORAL at 08:35

## 2023-12-06 RX ADMIN — ANTACID/ANTIFLATULENT 1 PACKET: 380; 550; 10; 10 GRANULE, EFFERVESCENT ORAL at 08:35

## 2023-12-06 RX ADMIN — BARIUM SULFATE 183 ML: 960 POWDER, FOR SUSPENSION ORAL at 08:35

## 2023-12-07 DIAGNOSIS — R06.6 HICCUPS: ICD-10-CM

## 2023-12-07 DIAGNOSIS — R10.13 DYSPEPSIA: Primary | ICD-10-CM

## 2023-12-07 DIAGNOSIS — R93.3 ABNORMAL FINDING ON GI TRACT IMAGING: ICD-10-CM

## 2023-12-07 DIAGNOSIS — R13.10 DYSPHAGIA, UNSPECIFIED TYPE: ICD-10-CM

## 2023-12-07 DIAGNOSIS — R12 HEARTBURN: ICD-10-CM

## 2023-12-07 NOTE — PROGRESS NOTES
Please inform the patient x-ray shows a delay in barium tablet in the mid to proximal esophagus.  Would offer EGD with Dr. Rose for further evaluation of symptoms.  Case request has been placed.  Please forward to our  Carmel if she is agreeable.

## 2023-12-08 ENCOUNTER — TELEPHONE (OUTPATIENT)
Dept: GASTROENTEROLOGY | Facility: CLINIC | Age: 63
End: 2023-12-08
Payer: COMMERCIAL

## 2023-12-08 NOTE — TELEPHONE ENCOUNTER
CALLED TO SCHEDULE PT,PT INSISTED SHE WAS NOT SUPPOSE TO BE GETTING  SCHEDULED FOR A EGD AND REFUSED TO SCHEDULE.OK FOR HUB TO READ

## 2023-12-11 ENCOUNTER — TELEPHONE (OUTPATIENT)
Dept: GASTROENTEROLOGY | Facility: CLINIC | Age: 63
End: 2023-12-11
Payer: COMMERCIAL

## 2023-12-11 PROBLEM — R12 HEARTBURN: Status: ACTIVE | Noted: 2023-12-07

## 2023-12-11 PROBLEM — R93.3 ABNORMAL FINDING ON GI TRACT IMAGING: Status: ACTIVE | Noted: 2023-12-07

## 2023-12-11 PROBLEM — R06.6 HICCUPS: Status: ACTIVE | Noted: 2023-12-07

## 2023-12-11 PROBLEM — R10.13 DYSPEPSIA: Status: ACTIVE | Noted: 2023-12-07

## 2023-12-11 PROBLEM — R13.10 DYSPHAGIA: Status: ACTIVE | Noted: 2023-12-07

## 2023-12-11 NOTE — TELEPHONE ENCOUNTER
UZAIR VALENTIN PT SCHEDULED 12/13/2023 @6:30AM.EGD PREP INSTRUCTIONS SET TO PT CHRISTOPHER FOR HUB TO READ

## 2023-12-11 NOTE — TELEPHONE ENCOUNTER
HUB STAFF ATTEMPTED TO FOLLOW WARM TRANSFER PROCESS BUT WAS UNSUCCESSFUL.      Caller: Arlene Quesada     Relationship to patient: Self     Best call back number: [phone]     Chief complaint: RESCHEDULE EGD      Type of visit: GASTRO PROCEDURE      Requested date: 12/13/23 @ 3:20PM.      If rescheduling, when is the original appointment: 12/18/23     Additional notes: CALLING TO REE IF SHE COULD CHANGE DATE TO ANOTHER ONE OF THE DATES OFFRED TO HER WHEN SHE SPOKE WITH JASMIN BEVERLY EARLIER TODAY. HER  IS UNABLE TO BRING HER ON 12/18/23 BUT CAN BRING HER ON 12/13/23. PLEASE REVIEW AND CONTACT PATIENT TO ADVISE.

## 2023-12-11 NOTE — TELEPHONE ENCOUNTER
HUB STAFF ATTEMPTED TO FOLLOW WARM TRANSFER PROCESS BUT WAS UNSUCCESSFUL.     Caller: Arlene Quesada    Relationship to patient: Self    Best call back number: [phone]    Chief complaint: RESCHEDULE EGD     Type of visit: GASTRO PROCEDURE     Requested date: 12/13/23 @ 3:20PM.     If rescheduling, when is the original appointment: 12/18/23    Additional notes: CALLING TO REE IF SHE COULD CHANGE DATE TO ANOTHER ONE OF THE DATES OFFRED TO HER WHEN SHE SPOKE WITH JASMIN BEVERLY EARLIER TODAY. HER  IS UNABLE TO BRING HER ON 12/18/23 BUT CAN BRING HER ON 12/13/23. PLEASE REVIEW AND CONTACT PATIENT TO ADVISE.

## 2023-12-11 NOTE — TELEPHONE ENCOUNTER
UZAIR VALENTIN PT SCHEDULED 12/18/2023 @6:30AM.EGD PREP INSTRUCTIONS SET TO PT CHRISTOPHER FOR HUB TO READ

## 2023-12-11 NOTE — TELEPHONE ENCOUNTER
----- Message from DAREK Carrillo sent at 12/7/2023  2:11 PM EST -----  Please inform the patient x-ray shows a delay in barium tablet in the mid to proximal esophagus.  Would offer EGD with Dr. Rose for further evaluation of symptoms.  Case request has been placed.  Please forward to our  Carmel if she is umu leahy.

## 2023-12-11 NOTE — TELEPHONE ENCOUNTER
Called pt and advised of Margy LARKIN's note.  Pt verbalized understanding.    Pt is agreeable to EGD.  Msg sent to Carmel.    Pt states she is having constant hiccups, advised will send a msg to CHAYA Ji to see if this is r/t esophageal narrowing.

## 2023-12-13 ENCOUNTER — TELEPHONE (OUTPATIENT)
Dept: GASTROENTEROLOGY | Facility: CLINIC | Age: 63
End: 2023-12-13
Payer: COMMERCIAL

## 2023-12-13 ENCOUNTER — ANESTHESIA (OUTPATIENT)
Dept: GASTROENTEROLOGY | Facility: HOSPITAL | Age: 63
End: 2023-12-13
Payer: COMMERCIAL

## 2023-12-13 ENCOUNTER — ANESTHESIA EVENT (OUTPATIENT)
Dept: GASTROENTEROLOGY | Facility: HOSPITAL | Age: 63
End: 2023-12-13
Payer: COMMERCIAL

## 2023-12-13 ENCOUNTER — HOSPITAL ENCOUNTER (OUTPATIENT)
Facility: HOSPITAL | Age: 63
Setting detail: HOSPITAL OUTPATIENT SURGERY
Discharge: HOME | End: 2023-12-13
Attending: INTERNAL MEDICINE | Admitting: INTERNAL MEDICINE
Payer: COMMERCIAL

## 2023-12-13 VITALS
DIASTOLIC BLOOD PRESSURE: 84 MMHG | RESPIRATION RATE: 22 BRPM | HEART RATE: 78 BPM | WEIGHT: 149.3 LBS | HEIGHT: 63 IN | OXYGEN SATURATION: 97 % | BODY MASS INDEX: 26.45 KG/M2 | SYSTOLIC BLOOD PRESSURE: 145 MMHG

## 2023-12-13 DIAGNOSIS — R13.10 DYSPHAGIA, UNSPECIFIED TYPE: ICD-10-CM

## 2023-12-13 DIAGNOSIS — R06.6 HICCUPS: ICD-10-CM

## 2023-12-13 DIAGNOSIS — R93.3 ABNORMAL FINDING ON GI TRACT IMAGING: ICD-10-CM

## 2023-12-13 DIAGNOSIS — R10.13 DYSPEPSIA: ICD-10-CM

## 2023-12-13 DIAGNOSIS — R12 HEARTBURN: ICD-10-CM

## 2023-12-13 LAB — GLUCOSE BLDC GLUCOMTR-MCNC: 77 MG/DL (ref 70–130)

## 2023-12-13 PROCEDURE — S0260 H&P FOR SURGERY: HCPCS | Performed by: INTERNAL MEDICINE

## 2023-12-13 PROCEDURE — 43239 EGD BIOPSY SINGLE/MULTIPLE: CPT | Performed by: INTERNAL MEDICINE

## 2023-12-13 PROCEDURE — 25010000002 PROPOFOL 10 MG/ML EMULSION: Performed by: ANESTHESIOLOGY

## 2023-12-13 PROCEDURE — 25810000003 LACTATED RINGERS PER 1000 ML: Performed by: INTERNAL MEDICINE

## 2023-12-13 PROCEDURE — 82948 REAGENT STRIP/BLOOD GLUCOSE: CPT

## 2023-12-13 PROCEDURE — 88305 TISSUE EXAM BY PATHOLOGIST: CPT | Performed by: INTERNAL MEDICINE

## 2023-12-13 RX ORDER — SODIUM CHLORIDE 9 MG/ML
40 INJECTION, SOLUTION INTRAVENOUS AS NEEDED
Status: DISCONTINUED | OUTPATIENT
Start: 2023-12-13 | End: 2023-12-13 | Stop reason: HOSPADM

## 2023-12-13 RX ORDER — PROPOFOL 10 MG/ML
VIAL (ML) INTRAVENOUS CONTINUOUS PRN
Status: DISCONTINUED | OUTPATIENT
Start: 2023-12-13 | End: 2023-12-13 | Stop reason: SURG

## 2023-12-13 RX ORDER — ESOMEPRAZOLE MAGNESIUM 40 MG/1
40 CAPSULE, DELAYED RELEASE ORAL
COMMUNITY

## 2023-12-13 RX ORDER — SODIUM CHLORIDE, SODIUM LACTATE, POTASSIUM CHLORIDE, CALCIUM CHLORIDE 600; 310; 30; 20 MG/100ML; MG/100ML; MG/100ML; MG/100ML
30 INJECTION, SOLUTION INTRAVENOUS CONTINUOUS PRN
Status: DISCONTINUED | OUTPATIENT
Start: 2023-12-13 | End: 2023-12-13 | Stop reason: HOSPADM

## 2023-12-13 RX ORDER — SODIUM CHLORIDE 0.9 % (FLUSH) 0.9 %
10 SYRINGE (ML) INJECTION AS NEEDED
Status: DISCONTINUED | OUTPATIENT
Start: 2023-12-13 | End: 2023-12-13 | Stop reason: HOSPADM

## 2023-12-13 RX ORDER — SODIUM CHLORIDE 0.9 % (FLUSH) 0.9 %
10 SYRINGE (ML) INJECTION EVERY 12 HOURS SCHEDULED
Status: DISCONTINUED | OUTPATIENT
Start: 2023-12-13 | End: 2023-12-13 | Stop reason: HOSPADM

## 2023-12-13 RX ADMIN — SODIUM CHLORIDE, POTASSIUM CHLORIDE, SODIUM LACTATE AND CALCIUM CHLORIDE 30 ML/HR: 600; 310; 30; 20 INJECTION, SOLUTION INTRAVENOUS at 14:21

## 2023-12-13 RX ADMIN — PROPOFOL 50 MCG/KG/MIN: 10 INJECTION, EMULSION INTRAVENOUS at 15:58

## 2023-12-13 NOTE — BRIEF OP NOTE
ESOPHAGOGASTRODUODENOSCOPY  Progress Note    Arlene Quesada  12/13/2023    Pre-op Diagnosis:   Dyspepsia [R10.13]  Hiccups [R06.6]  Dysphagia, unspecified type [R13.10]  Heartburn [R12]  Abnormal finding on GI tract imaging [R93.3]       Post-Op Diagnosis Codes:     * Dyspepsia [R10.13]     * Hiccups [R06.6]     * Dysphagia, unspecified type [R13.10]     * Heartburn [R12]     * Abnormal finding on GI tract imaging [R93.3]    Procedure/CPT® Codes:        Procedure(s):  ESOPHAGOGASTRODUODENOSCOPY with cold biopsies              Surgeon(s):  Silver Rose MD    Anesthesia: Monitored Anesthesia Care    Staff:   Endo Technician: Queenie Kamara  Endo Nurse: Katelin Shultz RN         Estimated Blood Loss: minimal    Urine Voided: * No values recorded between 12/13/2023  3:50 PM and 12/13/2023  4:06 PM *    Specimens:                Specimens       ID Source Type Tests Collected By Collected At Frozen?    A Esophagus, Mid Tissue TISSUE PATHOLOGY EXAM   Silver Rose MD 12/13/23 [id number]     Description: mid esophagus biopsies    This specimen was not marked as sent.                  Drains: * No LDAs found *    Findings: EGD biopsies were performed with normal esophagus gastric and duodenal mucosa.  Biopsies of the midesophagus were obtained to rule out eosinophilic esophagitis.        Complications: None          Silver Rose MD     Date: 12/13/2023  Time: 16:06 EST

## 2023-12-13 NOTE — ANESTHESIA PREPROCEDURE EVALUATION
Anesthesia Evaluation     Patient summary reviewed and Nursing notes reviewed                Airway   Mallampati: II  TM distance: >3 FB  Neck ROM: full  Dental      Pulmonary    (+) a smoker Former,  Cardiovascular     ECG reviewed  Rhythm: regular  Rate: normal    (+) hypertension, hyperlipidemia      Neuro/Psych  (+) psychiatric history Anxiety and Depression  GI/Hepatic/Renal/Endo    (+) obesity, GERD, thyroid problem hypothyroidism    Musculoskeletal     (+) neck pain  Abdominal    Substance History - negative use     OB/GYN negative ob/gyn ROS         Other   arthritis,                   Anesthesia Plan    ASA 3     MAC     (Semaglutide)  intravenous induction     Anesthetic plan, risks, benefits, and alternatives have been provided, discussed and informed consent has been obtained with: patient.    CODE STATUS:

## 2023-12-13 NOTE — ANESTHESIA POSTPROCEDURE EVALUATION
Patient: Arlene Quesada    Procedure Summary       Date: 12/13/23 Room / Location:  [facility]    Anesthesia Start: 1556 Anesthesia Stop: 1607    Procedure: ESOPHAGOGASTRODUODENOSCOPY with cold biopsies (Esophagus) Diagnosis:       Dyspepsia      Hiccups      Dysphagia, unspecified type      Heartburn      Abnormal finding on GI tract imaging      (Dyspepsia [R10.13])      (Hiccups [R06.6])      (Dysphagia, unspecified type [R13.10])      (Heartburn [R12])      (Abnormal finding on GI tract imaging [R93.3])    Surgeons: Silver Rose MD Provider: Cheng Brice MD    Anesthesia Type: MAC ASA Status: 3            Anesthesia Type: MAC    Vitals  Vitals Value Taken Time   /73 12/13/23 1611   Temp     Pulse 84 12/13/23 1614   Resp 16 12/13/23 1611   SpO2 95 % 12/13/23 1614   Vitals shown include unfiled device data.        Post Anesthesia Care and Evaluation    Patient location during evaluation: PACU  Patient participation: complete - patient participated  Level of consciousness: awake and alert  Pain management: adequate    Airway patency: patent  Anesthetic complications: No anesthetic complications    Cardiovascular status: acceptable  Respiratory status: acceptable  Hydration status: acceptable    Comments: --------------------            12/13/23               1611     --------------------   BP:       126/73     Pulse:      84       Resp:       16       SpO2:      97%      --------------------

## 2023-12-13 NOTE — PERIOPERATIVE NURSING NOTE
Pt took her Wegovy yesterday. Informed Dr. Rsoe and Dr. Brice. Both agreed to continue with the scheduled EGD. Explained to pt the high probability of aspiration because of the Wegovy that it she may have undigested food present in her stomach during the procedure and that they may need to abort procedure if that happened. Pt agreed to proceed with the EGD despite the risk noted.

## 2023-12-13 NOTE — DISCHARGE INSTRUCTIONS
For the next 24 hours patient needs to be with a responsible adult.    For 24 hours DO NOT drive, operate machinery, appliances, drink alcohol, make important decisions or sign legal documents.    Start with a light or bland diet if you are feeling sick to your stomach otherwise advance to regular diet as tolerated.    Follow recommendations on procedure report if provided by your doctor.    Call Dr Rose for problems [phone]    Problems may include but not limited to: large amounts of bleeding, trouble breathing, repeated vomiting, severe unrelieved pain, fever or chills.

## 2023-12-13 NOTE — TELEPHONE ENCOUNTER
----- Message from Arlene Quesada sent at 12/13/2023  8:16 AM EST -----  Regarding: EGD PREP INSTRUCTIONS FOR 12/13/2023  Contact: [phone]  It says I am scheduled for a colonoscopy?

## 2023-12-13 NOTE — H&P
[facility]  Pre Procedure History & Physical    Chief Complaint:   Esophageal dysphagia    Subjective     HPI:   Patient 64-year-old female with history of hypertension, hyperlipidemia on Ozempic for weight loss presenting with dysphagia.  Esophagram suggesting dysmotility without stricture here for EGD.    Past Medical History:   Past Medical History:   Diagnosis Date    Anemia, iron deficiency     Arthritis     Depression     Gastroesophageal reflux     Glaucoma     History of complete eye exam 25932    [facility]: early glaucoma    History of EKG 03/2006    borderline QT prolonged, NSR    History of MRI of cervical spine 03/29/2011    Multilevel DDD with multilevel spinal steonsis, most severe at C5-6 and C6-7, less prominent at C3-4 and C4-5, there is neuroforaminimal compromise on the right at multiple levels    History of MRI of lumbar spine 03/29/2011    Multilevel DDD most prominent at L4-5 and L5-S1, moderate facet DDD, protrusion in L4-5 and mild disc bulge at L5-S1    Hyperlipemia     Hypertension, essential, benign     Low back pain     Nausea 01/23/2015    Reflux esophagitis     Thyroid disorder     Thyroid nodule        Past Surgical History:  Past Surgical History:   Procedure Laterality Date    APPENDECTOMY      BREAST BIOPSY  09/26/2013    left - benign fibroadenoma    CHOLECYSTECTOMY      COLONOSCOPY      COLPOSCOPY      ENDOMETRIAL ABLATION      ENDOSCOPY  03/2006    Dr. Ruiz: clean based gastric ulcer    HYSTERECTOMY  10/2010    THYROIDECTOMY, PARTIAL Right     TRABECULECTOMY Bilateral     UPPER GASTROINTESTINAL ENDOSCOPY  2021       Family History:  Family History   Problem Relation Age of Onset    Breast cancer Mother     Kidney cancer Mother     Hypertension Father     Diabetes Brother        Social History:   reports that she quit smoking about 21 years ago. Her smoking use included cigarettes. She has never used smokeless tobacco. She reports current alcohol use.  She reports that she does not use drugs.    Medications:   Medications Prior to Admission   Medication Sig Dispense Refill Last Dose    amLODIPine-benazepril (LOTREL 5-10) 5-10 MG per capsule Take 1 capsule by mouth Daily. For BP 90 capsule 1 12/12/2023    buPROPion XL (WELLBUTRIN XL) 300 MG 24 hr tablet Take 1 tablet by mouth Every Morning. For mood 90 tablet 3 12/12/2023    Cholecalciferol (VITAMIN D3) 2000 UNITS tablet Take by mouth. Take 1 capsule by oral route daily for 90 days   12/12/2023    esomeprazole (nexIUM) 40 MG capsule Take 1 capsule by mouth Every Morning Before Breakfast.   12/12/2023    FLUoxetine (PROzac) 40 MG capsule Take 2 capsules by mouth Daily. For depression and anxiety 180 capsule 3 12/12/2023    metFORMIN ER (GLUCOPHAGE-XR) 500 MG 24 hr tablet 1 p.o. with food for impaired fasting glucose 90 tablet 3 12/12/2023    montelukast (SINGULAIR) 10 MG tablet Take 1 tablet by mouth Every Night. For allergies 90 tablet 3 12/12/2023    NP Thyroid 90 MG tablet    12/13/2023    Semaglutide-Weight Management (Wegovy) 0.25 MG/0.5ML solution auto-injector Inject 0.25 mg under the skin into the appropriate area as directed 1 (One) Time Per Week. Inject 0.25 mg SQ weekly x4 2 mL 0 12/12/2023    timolol (TIMOPTIC) 0.5 % ophthalmic solution    12/12/2023    valACYclovir (VALTREX) 1000 MG tablet TAKE 2 TABLETS BY MOUTH AT ONSET FOR FEVER BLISTER. REPEAT THIS DOSE 1 TIME IN 12 HOURS 20 tablet 5 12/12/2023    albuterol sulfate  (90 Base) MCG/ACT inhaler Inhale 2 puffs Every 4 (Four) Hours As Needed.   More than a month    bimatoprost (LUMIGAN) 0.01 % ophthalmic drops 1 drop Every Night. Instill 1 in affected eye once a day (at bedtime)   Unknown    Calcium Carbonate-Vit D-Min (CALCIUM 1200) 3700-5856 MG-UNIT chewable tablet Chew.   Unknown    Glucosamine HCl-MSM (GLUCOSAMINE-MSM) 1500-500 MG/30ML liquid Take  by mouth. (Patient not taking: Reported on 10/26/2023)   Unknown    hyoscyamine (ANASPAZ,LEVSIN)  0.125 MG tablet Take 1 tablet by mouth Every 6 (Six) Hours As Needed (dyspepsia). (Patient not taking: Reported on 10/26/2023) 60 tablet 0 Unknown    Insulin Pen Needle 32G X 4 MM misc For once daily use with daily injection (Patient not taking: Reported on 10/26/2023) 100 each 3     levoFLOXacin (LEVAQUIN) 500 MG tablet TAKE 1 TABLET BY MOUTH DAILY FOR INFECTION (Patient not taking: Reported on 10/26/2023) 10 tablet 0 Unknown    lidocaine (LIDODERM) 5 % Place 2 patches on the skin Daily. Remove & Discard patch within 12 hours or as directed by MD (Patient not taking: Reported on 10/26/2023) 30 patch 3 Unknown    LORazepam (ATIVAN) 0.5 MG tablet  (Patient not taking: Reported on 10/26/2023)   Unknown    metaxalone (SKELAXIN) 800 MG tablet Take 1 tablet by mouth 2 (Two) Times a Day As Needed for Muscle Spasms. (Patient not taking: Reported on 10/26/2023) 60 tablet 1 Unknown    mupirocin (Bactroban) 2 % ointment Apply  topically to the appropriate area as directed 3 (Three) Times a Day. To wound area until healed (Patient not taking: Reported on 10/26/2023) 30 each 1     nitrofurantoin, macrocrystal-monohydrate, (MACROBID) 100 MG capsule Take 1 capsule by mouth Daily. For prophylaxis PRN 30 capsule 5 Unknown    norethindrone-ethinyl estradiol (FEMHRT 1/5) 1-5 MG-MCG tablet Take  by mouth Daily. (Patient not taking: Reported on 10/26/2023)   Unknown    terconazole (TERAZOL 3) 0.8 % vaginal cream Insert 1 applicator into the vagina Every Night. X 3 days for yeast 20 g 5     Testosterone Propionate (FIRST-TESTOSTERONE) 2 % ointment Place  on the skin as directed by provider.   Unknown    tobramycin-dexamethasone (TOBRADEX) 0.3-0.1 % ophthalmic suspension  (Patient not taking: Reported on 10/26/2023)   Unknown    XIIDRA 5 % solution  (Patient not taking: Reported on 10/26/2023)          Allergies:  Carrot [daucus carota], Banana, and Augmentin [amoxicillin-pot clavulanate]    ROS:    Pertinent items are noted in HPI  "    Objective     Blood pressure 140/95, pulse 82, resp. rate 16, height 160 cm (63\"), weight 67.7 kg (149 lb 4.8 oz), SpO2 98%, not currently breastfeeding.    Physical Exam   Constitutional: Pt is oriented to person, place, and time and well-developed, well-nourished, and in no distress.   Mouth/Throat: Oropharynx is clear and moist.   Neck: Normal range of motion.   Cardiovascular: Normal rate, regular rhythm and normal heart sounds.    Pulmonary/Chest: Effort normal and breath sounds normal.   Abdominal: Soft. Nontender  Skin: Skin is warm and dry.   Psychiatric: Mood, memory, affect and judgment normal.     Assessment & Plan     Diagnosis:  Esophageal dysphagia    Anticipated Surgical Procedure:  EGD    The risks, benefits, and alternatives of this procedure have been discussed with the patient or the responsible party- the patient understands and agrees to proceed.                                                          "

## 2023-12-15 LAB
LAB AP CASE REPORT: NORMAL
PATH REPORT.FINAL DX SPEC: NORMAL
PATH REPORT.GROSS SPEC: NORMAL

## 2023-12-19 ENCOUNTER — TELEPHONE (OUTPATIENT)
Dept: GASTROENTEROLOGY | Facility: CLINIC | Age: 63
End: 2023-12-19
Payer: COMMERCIAL

## 2023-12-19 DIAGNOSIS — R13.10 DYSPHAGIA, UNSPECIFIED TYPE: ICD-10-CM

## 2023-12-19 DIAGNOSIS — R10.13 DYSPEPSIA: Primary | ICD-10-CM

## 2023-12-19 DIAGNOSIS — R93.3 ABNORMAL FINDING ON GI TRACT IMAGING: ICD-10-CM

## 2023-12-19 NOTE — TELEPHONE ENCOUNTER
----- Message from Silver Rose MD sent at 12/18/2023  2:34 PM EST -----  Biopsies normal, no abnormalities of the esophagus itself.  If ongoing symptoms consider for speech video swallow

## 2023-12-19 NOTE — TELEPHONE ENCOUNTER
Called pt and advised of Dr. Guardado note.  Pt verbalized understanding.     Pt is agreeable to video swallow.  Order placed, sent to Dr Rose to coisgn.

## 2023-12-21 NOTE — TELEPHONE ENCOUNTER
Called patient and she states she is still feeling the food stopping and it feels like it is at the bottom of the throat. She is asking is there any medication that can help with this. She also asked could wagovy have caused this.     Sometimes burping relieves it a little

## 2023-12-21 NOTE — TELEPHONE ENCOUNTER
Hub staff attempted to follow warm transfer process and was unsuccessful     Caller: Arlene Quesada    Relationship to patient: Self    Best call back number: [phone]    Patient is needing: PT IS CALLING NEEDING TO SPEAK WITH EITHER DR AMAYA OR HIS NURSE. PT HAD THE PROCEDURES DONE BUT IS WANTING TO KNOW IF THERE'S MAYBE A NEW MEDICINE OR SOMETHING THAT CAN BE SUGGESTED TO HELP HER WITH THE FEELING LIKE FOOD IS STUCK AND NOT GOING DOWN. IT'S OK TO [facility].

## 2023-12-22 NOTE — TELEPHONE ENCOUNTER
Per Dr. Rose:   UnFortunately no medication is effective for the esophageal bar.   can you arrange swallow study by speech to see if there is anything they can do to help alleviate the symptoms.    Patient called. Advised of Dr. Rose's note. She verb understanding.

## 2024-01-01 NOTE — TELEPHONE ENCOUNTER
Spoke to pt and answered questions regarding epidurals and facet injections. She was wondering if Dr. Zamora had to order facets after the epidurals if she would have to wait for authorization for the facets again.   
(2) cough or sneeze

## 2024-01-05 ENCOUNTER — HOSPITAL ENCOUNTER (OUTPATIENT)
Dept: GENERAL RADIOLOGY | Facility: HOSPITAL | Age: 64
Discharge: HOME | End: 2024-01-05
Payer: COMMERCIAL

## 2024-01-05 DIAGNOSIS — R10.13 DYSPEPSIA: ICD-10-CM

## 2024-01-05 DIAGNOSIS — R93.3 ABNORMAL FINDING ON GI TRACT IMAGING: ICD-10-CM

## 2024-01-05 DIAGNOSIS — R13.10 DYSPHAGIA, UNSPECIFIED TYPE: ICD-10-CM

## 2024-01-31 ENCOUNTER — TELEPHONE (OUTPATIENT)
Dept: FAMILY MEDICINE CLINIC | Facility: CLINIC | Age: 64
End: 2024-01-31
Payer: COMMERCIAL

## 2024-01-31 RX ORDER — AMLODIPINE BESYLATE AND BENAZEPRIL HYDROCHLORIDE 5; 10 MG/1; MG/1
1 CAPSULE ORAL DAILY
Qty: 10 CAPSULE | Refills: 0 | Status: SHIPPED | OUTPATIENT
Start: 2024-01-31

## 2024-01-31 NOTE — TELEPHONE ENCOUNTER
Caller: Dipak Arlene G    Relationship: Self    Best call back number: [phone] (Home)     Requested Prescriptions:   amLODIPine-benazepril (LOTREL 5-10) 5-10 MG per capsule        Pharmacy where request should be sent:  [facility], [address] [phone] [facility] [phone]      Last office visit with prescribing clinician: 6/8/2023   Last telemedicine visit with prescribing clinician: Visit date not found   Next office visit with prescribing clinician: 2/14/2024     Additional details provided by patient: PATIENT CALLED TO REQUEST A MEDICATION REFILL ON MEDICATION. PATIENT HAS A 5 DAY SUPPLY LEFT.      Does the patient have less than a 3 day supply:  [] Yes  [x] No    Would you like a call back once the refill request has been completed: [] Yes [] No    If the office needs to give you a call back, can they leave a voicemail: [] Yes [] No    Suzette Henry Rep   01/31/24 09:21 EST         THANKS

## 2024-02-01 RX ORDER — VALACYCLOVIR HYDROCHLORIDE 1 G/1
TABLET, FILM COATED ORAL
Qty: 20 TABLET | Refills: 5 | Status: SHIPPED | OUTPATIENT
Start: 2024-02-01

## 2024-02-14 ENCOUNTER — OFFICE VISIT (OUTPATIENT)
Dept: FAMILY MEDICINE CLINIC | Facility: CLINIC | Age: 64
End: 2024-02-14
Payer: COMMERCIAL

## 2024-02-14 VITALS
SYSTOLIC BLOOD PRESSURE: 126 MMHG | HEART RATE: 82 BPM | RESPIRATION RATE: 16 BRPM | OXYGEN SATURATION: 98 % | HEIGHT: 63 IN | BODY MASS INDEX: 24.8 KG/M2 | TEMPERATURE: 97.1 F | WEIGHT: 140 LBS | DIASTOLIC BLOOD PRESSURE: 78 MMHG

## 2024-02-14 DIAGNOSIS — F33.41 RECURRENT MAJOR DEPRESSIVE DISORDER, IN PARTIAL REMISSION: ICD-10-CM

## 2024-02-14 DIAGNOSIS — J30.9 CHRONIC ALLERGIC RHINITIS: ICD-10-CM

## 2024-02-14 DIAGNOSIS — E89.0 POST-SURGICAL HYPOTHYROIDISM: ICD-10-CM

## 2024-02-14 DIAGNOSIS — K21.9 GASTROESOPHAGEAL REFLUX DISEASE WITHOUT ESOPHAGITIS: ICD-10-CM

## 2024-02-14 DIAGNOSIS — T14.8XXA BRUISING: ICD-10-CM

## 2024-02-14 DIAGNOSIS — I10 HYPERTENSION, ESSENTIAL, BENIGN: Primary | ICD-10-CM

## 2024-02-14 DIAGNOSIS — F41.1 GENERALIZED ANXIETY DISORDER: ICD-10-CM

## 2024-02-14 DIAGNOSIS — E78.2 MIXED HYPERLIPIDEMIA: ICD-10-CM

## 2024-02-14 DIAGNOSIS — D75.839 THROMBOCYTOSIS: ICD-10-CM

## 2024-02-14 DIAGNOSIS — E55.9 VITAMIN D DEFICIENCY: ICD-10-CM

## 2024-02-14 DIAGNOSIS — R73.01 IMPAIRED FASTING GLUCOSE: ICD-10-CM

## 2024-02-14 RX ORDER — BENAZEPRIL HYDROCHLORIDE 10 MG/1
10 TABLET ORAL DAILY
Qty: 90 TABLET | Refills: 1 | Status: SHIPPED | OUTPATIENT
Start: 2024-02-14

## 2024-02-14 RX ORDER — MONTELUKAST SODIUM 10 MG/1
10 TABLET ORAL NIGHTLY
Qty: 90 TABLET | Refills: 3 | Status: SHIPPED | OUTPATIENT
Start: 2024-02-14

## 2024-02-14 RX ORDER — FLUCONAZOLE 150 MG/1
150 TABLET ORAL ONCE
Qty: 1 TABLET | Refills: 11 | Status: SHIPPED | OUTPATIENT
Start: 2024-02-14 | End: 2024-02-14

## 2024-02-14 RX ORDER — FLUCONAZOLE 150 MG/1
150 TABLET ORAL ONCE
Qty: 1 TABLET | Refills: 11 | Status: SHIPPED | OUTPATIENT
Start: 2024-02-14 | End: 2024-02-14 | Stop reason: SDUPTHER

## 2024-02-14 RX ORDER — NITROFURANTOIN 25; 75 MG/1; MG/1
100 CAPSULE ORAL DAILY
Qty: 30 CAPSULE | Refills: 5 | Status: SHIPPED | OUTPATIENT
Start: 2024-02-14 | End: 2024-02-14

## 2024-02-14 RX ORDER — NITROFURANTOIN 25; 75 MG/1; MG/1
100 CAPSULE ORAL DAILY
Qty: 30 CAPSULE | Refills: 5 | Status: SHIPPED | OUTPATIENT
Start: 2024-02-14

## 2024-02-14 RX ORDER — AMLODIPINE BESYLATE 2.5 MG/1
2.5 TABLET ORAL DAILY
Qty: 90 TABLET | Refills: 1 | Status: SHIPPED | OUTPATIENT
Start: 2024-02-14 | End: 2024-02-14 | Stop reason: SDUPTHER

## 2024-02-14 RX ORDER — VALACYCLOVIR HYDROCHLORIDE 1 G/1
TABLET, FILM COATED ORAL
Qty: 20 TABLET | Refills: 5 | Status: SHIPPED | OUTPATIENT
Start: 2024-02-14

## 2024-02-14 RX ORDER — AMLODIPINE BESYLATE 2.5 MG/1
2.5 TABLET ORAL DAILY
Qty: 90 TABLET | Refills: 1 | Status: SHIPPED | OUTPATIENT
Start: 2024-02-14

## 2024-02-14 RX ORDER — BUPROPION HYDROCHLORIDE 300 MG/1
300 TABLET ORAL EVERY MORNING
Qty: 90 TABLET | Refills: 3 | Status: SHIPPED | OUTPATIENT
Start: 2024-02-14 | End: 2024-02-14 | Stop reason: SDUPTHER

## 2024-02-14 RX ORDER — BUPROPION HYDROCHLORIDE 300 MG/1
300 TABLET ORAL EVERY MORNING
Qty: 90 TABLET | Refills: 3 | Status: SHIPPED | OUTPATIENT
Start: 2024-02-14

## 2024-02-14 RX ORDER — BENAZEPRIL HYDROCHLORIDE 10 MG/1
10 TABLET ORAL DAILY
Qty: 90 TABLET | Refills: 1 | Status: SHIPPED | OUTPATIENT
Start: 2024-02-14 | End: 2024-02-14 | Stop reason: SDUPTHER

## 2024-02-14 RX ORDER — FLUOXETINE HYDROCHLORIDE 40 MG/1
80 CAPSULE ORAL DAILY
Qty: 180 CAPSULE | Refills: 3 | Status: SHIPPED | OUTPATIENT
Start: 2024-02-14

## 2024-02-14 RX ORDER — FLUOXETINE HYDROCHLORIDE 40 MG/1
80 CAPSULE ORAL DAILY
Qty: 180 CAPSULE | Refills: 3 | Status: SHIPPED | OUTPATIENT
Start: 2024-02-14 | End: 2024-02-14 | Stop reason: SDUPTHER

## 2024-02-15 LAB
25(OH)D3+25(OH)D2 SERPL-MCNC: 55.1 NG/ML (ref 30–100)
ALBUMIN SERPL-MCNC: 4.2 G/DL (ref 3.9–4.9)
ALBUMIN/GLOB SERPL: 1.6 {RATIO} (ref 1.2–2.2)
ALP SERPL-CCNC: 95 IU/L (ref 44–121)
ALT SERPL-CCNC: 22 IU/L (ref 0–32)
APPEARANCE UR: CLEAR
APTT PPP: 26 SEC (ref 24–33)
AST SERPL-CCNC: 19 IU/L (ref 0–40)
BACTERIA #/AREA URNS HPF: ABNORMAL /[HPF]
BASOPHILS # BLD AUTO: 0 X10E3/UL (ref 0–0.2)
BASOPHILS NFR BLD AUTO: 0 %
BILIRUB SERPL-MCNC: <0.2 MG/DL (ref 0–1.2)
BILIRUB UR QL STRIP: NEGATIVE
BUN SERPL-MCNC: 17 MG/DL (ref 8–27)
BUN/CREAT SERPL: 27 (ref 12–28)
CALCIUM SERPL-MCNC: 9.8 MG/DL (ref 8.7–10.3)
CASTS URNS QL MICRO: ABNORMAL /LPF
CHLORIDE SERPL-SCNC: 100 MMOL/L (ref 96–106)
CHOLEST SERPL-MCNC: 183 MG/DL (ref 100–199)
CO2 SERPL-SCNC: 27 MMOL/L (ref 20–29)
COLOR UR: YELLOW
CREAT SERPL-MCNC: 0.63 MG/DL (ref 0.57–1)
EGFRCR SERPLBLD CKD-EPI 2021: 100 ML/MIN/1.73
EOSINOPHIL # BLD AUTO: 0.2 X10E3/UL (ref 0–0.4)
EOSINOPHIL NFR BLD AUTO: 2 %
EPI CELLS #/AREA URNS HPF: ABNORMAL /HPF (ref 0–10)
ERYTHROCYTE [DISTWIDTH] IN BLOOD BY AUTOMATED COUNT: 13.1 % (ref 11.7–15.4)
FERRITIN SERPL-MCNC: 66 NG/ML (ref 15–150)
FOLATE SERPL-MCNC: 7.4 NG/ML
GLOBULIN SER CALC-MCNC: 2.7 G/DL (ref 1.5–4.5)
GLUCOSE SERPL-MCNC: 77 MG/DL (ref 70–99)
GLUCOSE UR QL STRIP: NEGATIVE
HBA1C MFR BLD: 5.7 % (ref 4.8–5.6)
HCT VFR BLD AUTO: 36.4 % (ref 34–46.6)
HDLC SERPL-MCNC: 46 MG/DL
HGB BLD-MCNC: 11.9 G/DL (ref 11.1–15.9)
HGB UR QL STRIP: NEGATIVE
IMM GRANULOCYTES # BLD AUTO: 0 X10E3/UL (ref 0–0.1)
IMM GRANULOCYTES NFR BLD AUTO: 0 %
INR PPP: 1 (ref 0.9–1.2)
IRON SATN MFR SERPL: 18 % (ref 15–55)
IRON SERPL-MCNC: 62 UG/DL (ref 27–139)
KETONES UR QL STRIP: NEGATIVE
LDLC SERPL CALC-MCNC: 106 MG/DL (ref 0–99)
LEUKOCYTE ESTERASE UR QL STRIP: ABNORMAL
LYMPHOCYTES # BLD AUTO: 2.6 X10E3/UL (ref 0.7–3.1)
LYMPHOCYTES NFR BLD AUTO: 28 %
MAGNESIUM SERPL-MCNC: 1.9 MG/DL (ref 1.6–2.3)
MCH RBC QN AUTO: 28.9 PG (ref 26.6–33)
MCHC RBC AUTO-ENTMCNC: 32.7 G/DL (ref 31.5–35.7)
MCV RBC AUTO: 88 FL (ref 79–97)
MICRO URNS: ABNORMAL
MONOCYTES # BLD AUTO: 0.8 X10E3/UL (ref 0.1–0.9)
MONOCYTES NFR BLD AUTO: 8 %
NEUTROPHILS # BLD AUTO: 5.8 X10E3/UL (ref 1.4–7)
NEUTROPHILS NFR BLD AUTO: 62 %
NITRITE UR QL STRIP: NEGATIVE
PH UR STRIP: 6 [PH] (ref 5–7.5)
PLATELET # BLD AUTO: 465 X10E3/UL (ref 150–450)
POTASSIUM SERPL-SCNC: 4.3 MMOL/L (ref 3.5–5.2)
PROT SERPL-MCNC: 6.9 G/DL (ref 6–8.5)
PROT UR QL STRIP: NEGATIVE
PROTHROMBIN TIME: 10.5 SEC (ref 9.1–12)
RBC # BLD AUTO: 4.12 X10E6/UL (ref 3.77–5.28)
RBC #/AREA URNS HPF: ABNORMAL /HPF (ref 0–2)
SODIUM SERPL-SCNC: 140 MMOL/L (ref 134–144)
SP GR UR STRIP: 1.02 (ref 1–1.03)
T3FREE SERPL-MCNC: 4.6 PG/ML (ref 2–4.4)
T4 FREE SERPL-MCNC: 0.99 NG/DL (ref 0.82–1.77)
TIBC SERPL-MCNC: 353 UG/DL (ref 250–450)
TRIGL SERPL-MCNC: 178 MG/DL (ref 0–149)
TSH SERPL DL<=0.005 MIU/L-ACNC: 0.16 UIU/ML (ref 0.45–4.5)
UIBC SERPL-MCNC: 291 UG/DL (ref 118–369)
UROBILINOGEN UR STRIP-MCNC: 0.2 MG/DL (ref 0.2–1)
VIT B12 SERPL-MCNC: >2000 PG/ML (ref 232–1245)
VLDLC SERPL CALC-MCNC: 31 MG/DL (ref 5–40)
WBC # BLD AUTO: 9.4 X10E3/UL (ref 3.4–10.8)
WBC #/AREA URNS HPF: ABNORMAL /HPF (ref 0–5)

## 2024-04-20 ENCOUNTER — APPOINTMENT (OUTPATIENT)
Dept: GENERAL RADIOLOGY | Facility: HOSPITAL | Age: 64
DRG: 287 | End: 2024-04-20
Payer: COMMERCIAL

## 2024-04-20 ENCOUNTER — HOSPITAL ENCOUNTER (INPATIENT)
Facility: HOSPITAL | Age: 64
LOS: 2 days | Discharge: HOME | DRG: 287 | End: 2024-04-22
Attending: EMERGENCY MEDICINE | Admitting: INTERNAL MEDICINE
Payer: COMMERCIAL

## 2024-04-20 DIAGNOSIS — I21.4 NSTEMI, INITIAL EPISODE OF CARE: ICD-10-CM

## 2024-04-20 DIAGNOSIS — R07.9 CHEST PAIN AT REST: Primary | ICD-10-CM

## 2024-04-20 LAB
ALBUMIN SERPL-MCNC: 4.3 G/DL (ref 3.5–5.2)
ALBUMIN/GLOB SERPL: 1.4 G/DL
ALP SERPL-CCNC: 110 U/L (ref 39–117)
ALT SERPL W P-5'-P-CCNC: 40 U/L (ref 1–33)
ANION GAP SERPL CALCULATED.3IONS-SCNC: 12 MMOL/L (ref 5–15)
AST SERPL-CCNC: 25 U/L (ref 1–32)
BASOPHILS # BLD AUTO: 0.03 10*3/MM3 (ref 0–0.2)
BASOPHILS NFR BLD AUTO: 0.2 % (ref 0–1.5)
BILIRUB SERPL-MCNC: 0.2 MG/DL (ref 0–1.2)
BUN BLDA-MCNC: 11 MG/DL (ref 8–26)
BUN BLDA-MCNC: 12 MG/DL (ref 8–26)
BUN SERPL-MCNC: 11 MG/DL (ref 8–23)
BUN/CREAT SERPL: 14.7 (ref 7–25)
CA-I BLDA-SCNC: 1.21 MMOL/L (ref 1.15–1.33)
CA-I BLDA-SCNC: 1.26 MMOL/L (ref 1.15–1.33)
CALCIUM SPEC-SCNC: 9.5 MG/DL (ref 8.6–10.5)
CHLORIDE BLDA-SCNC: 101 MMOL/L (ref 98–107)
CHLORIDE BLDA-SCNC: 105 MMOL/L (ref 98–107)
CHLORIDE SERPL-SCNC: 101 MMOL/L (ref 98–107)
CO2 BLDA-SCNC: 26.8 MMOL/L (ref 23–27)
CO2 BLDA-SCNC: 28.7 MMOL/L (ref 23–27)
CO2 SERPL-SCNC: 25 MMOL/L (ref 22–29)
CREAT BLDA-MCNC: 0.39 MG/DL (ref 0.51–1.19)
CREAT BLDA-MCNC: <0.3 MG/DL (ref 0.51–1.19)
CREAT SERPL-MCNC: 0.75 MG/DL (ref 0.57–1)
D-LACTATE SERPL-SCNC: 0.9 MMOL/L (ref 0.5–2)
DEPRECATED RDW RBC AUTO: 44.3 FL (ref 37–54)
EGFRCR SERPLBLD CKD-EPI 2021: 90.1 ML/MIN/1.73
EOSINOPHIL # BLD AUTO: 1.04 10*3/MM3 (ref 0–0.4)
EOSINOPHIL NFR BLD AUTO: 8.4 % (ref 0.3–6.2)
ERYTHROCYTE [DISTWIDTH] IN BLOOD BY AUTOMATED COUNT: 13 % (ref 12.3–15.4)
GEN 5 2HR TROPONIN T REFLEX: 411 NG/L
GLOBULIN UR ELPH-MCNC: 3.1 GM/DL
GLUCOSE BLDC GLUCOMTR-MCNC: 133 MG/DL (ref 74–100)
GLUCOSE BLDC GLUCOMTR-MCNC: 79 MG/DL (ref 74–100)
GLUCOSE SERPL-MCNC: 93 MG/DL (ref 65–99)
HCT VFR BLD AUTO: 40.8 % (ref 34–46.6)
HGB BLD-MCNC: 12.8 G/DL (ref 12–15.9)
IMM GRANULOCYTES # BLD AUTO: 0.02 10*3/MM3 (ref 0–0.05)
IMM GRANULOCYTES NFR BLD AUTO: 0.2 % (ref 0–0.5)
LYMPHOCYTES # BLD AUTO: 2.34 10*3/MM3 (ref 0.7–3.1)
LYMPHOCYTES NFR BLD AUTO: 19 % (ref 19.6–45.3)
MCH RBC QN AUTO: 28.8 PG (ref 26.6–33)
MCHC RBC AUTO-ENTMCNC: 31.4 G/DL (ref 31.5–35.7)
MCV RBC AUTO: 91.9 FL (ref 79–97)
MONOCYTES # BLD AUTO: 0.89 10*3/MM3 (ref 0.1–0.9)
MONOCYTES NFR BLD AUTO: 7.2 % (ref 5–12)
NEUTROPHILS NFR BLD AUTO: 65 % (ref 42.7–76)
NEUTROPHILS NFR BLD AUTO: 7.99 10*3/MM3 (ref 1.7–7)
PLATELET # BLD AUTO: 560 10*3/MM3 (ref 140–450)
PMV BLD AUTO: 9.5 FL (ref 6–12)
POTASSIUM BLDA-SCNC: 3.6 MMOL/L (ref 3.5–4.5)
POTASSIUM BLDA-SCNC: 3.6 MMOL/L (ref 3.5–4.5)
POTASSIUM SERPL-SCNC: 4.1 MMOL/L (ref 3.5–5.2)
PROT SERPL-MCNC: 7.4 G/DL (ref 6–8.5)
QT INTERVAL: 437 MS
QT INTERVAL: 449 MS
QTC INTERVAL: 517 MS
QTC INTERVAL: 534 MS
RBC # BLD AUTO: 4.44 10*6/MM3 (ref 3.77–5.28)
SODIUM BLD-SCNC: 142 MMOL/L (ref 138–146)
SODIUM BLD-SCNC: 144 MMOL/L (ref 138–146)
SODIUM SERPL-SCNC: 138 MMOL/L (ref 136–145)
TROPONIN T DELTA: 204 NG/L
TROPONIN T SERPL HS-MCNC: 207 NG/L
WBC NRBC COR # BLD AUTO: 12.31 10*3/MM3 (ref 3.4–10.8)

## 2024-04-20 PROCEDURE — 25810000003 SODIUM CHLORIDE 0.9 % SOLUTION: Performed by: EMERGENCY MEDICINE

## 2024-04-20 PROCEDURE — 83605 ASSAY OF LACTIC ACID: CPT

## 2024-04-20 PROCEDURE — 99285 EMERGENCY DEPT VISIT HI MDM: CPT | Performed by: EMERGENCY MEDICINE

## 2024-04-20 PROCEDURE — 93005 ELECTROCARDIOGRAM TRACING: CPT | Performed by: EMERGENCY MEDICINE

## 2024-04-20 PROCEDURE — 80053 COMPREHEN METABOLIC PANEL: CPT | Performed by: EMERGENCY MEDICINE

## 2024-04-20 PROCEDURE — 80047 BASIC METABLC PNL IONIZED CA: CPT

## 2024-04-20 PROCEDURE — 71045 X-RAY EXAM CHEST 1 VIEW: CPT

## 2024-04-20 PROCEDURE — 25010000002 ENOXAPARIN PER 10 MG: Performed by: EMERGENCY MEDICINE

## 2024-04-20 PROCEDURE — 36415 COLL VENOUS BLD VENIPUNCTURE: CPT

## 2024-04-20 PROCEDURE — 99291 CRITICAL CARE FIRST HOUR: CPT

## 2024-04-20 PROCEDURE — 93010 ELECTROCARDIOGRAM REPORT: CPT | Performed by: INTERNAL MEDICINE

## 2024-04-20 PROCEDURE — 85025 COMPLETE CBC W/AUTO DIFF WBC: CPT | Performed by: EMERGENCY MEDICINE

## 2024-04-20 PROCEDURE — 93005 ELECTROCARDIOGRAM TRACING: CPT | Performed by: INTERNAL MEDICINE

## 2024-04-20 PROCEDURE — 84484 ASSAY OF TROPONIN QUANT: CPT | Performed by: EMERGENCY MEDICINE

## 2024-04-20 RX ORDER — FLUOXETINE HYDROCHLORIDE 20 MG/1
80 CAPSULE ORAL DAILY
Status: DISCONTINUED | OUTPATIENT
Start: 2024-04-20 | End: 2024-04-22 | Stop reason: HOSPADM

## 2024-04-20 RX ORDER — TOBRAMYCIN AND DEXAMETHASONE 3; 1 MG/ML; MG/ML
1 SUSPENSION/ DROPS OPHTHALMIC ONCE
Status: DISCONTINUED | OUTPATIENT
Start: 2024-04-20 | End: 2024-04-22 | Stop reason: HOSPADM

## 2024-04-20 RX ORDER — ASPIRIN 325 MG
325 TABLET ORAL ONCE
Status: COMPLETED | OUTPATIENT
Start: 2024-04-20 | End: 2024-04-20

## 2024-04-20 RX ORDER — LORAZEPAM 0.5 MG/1
0.5 TABLET ORAL EVERY 6 HOURS PRN
Status: DISCONTINUED | OUTPATIENT
Start: 2024-04-20 | End: 2024-04-22 | Stop reason: HOSPADM

## 2024-04-20 RX ORDER — BISACODYL 5 MG/1
5 TABLET, DELAYED RELEASE ORAL DAILY PRN
Status: DISCONTINUED | OUTPATIENT
Start: 2024-04-20 | End: 2024-04-22 | Stop reason: HOSPADM

## 2024-04-20 RX ORDER — MONTELUKAST SODIUM 10 MG/1
10 TABLET ORAL NIGHTLY
Status: DISCONTINUED | OUTPATIENT
Start: 2024-04-20 | End: 2024-04-22 | Stop reason: HOSPADM

## 2024-04-20 RX ORDER — SODIUM CHLORIDE 9 MG/ML
40 INJECTION, SOLUTION INTRAVENOUS AS NEEDED
Status: DISCONTINUED | OUTPATIENT
Start: 2024-04-20 | End: 2024-04-22 | Stop reason: HOSPADM

## 2024-04-20 RX ORDER — ALBUTEROL SULFATE 2.5 MG/3ML
2.5 SOLUTION RESPIRATORY (INHALATION) EVERY 6 HOURS PRN
Status: DISCONTINUED | OUTPATIENT
Start: 2024-04-20 | End: 2024-04-22 | Stop reason: HOSPADM

## 2024-04-20 RX ORDER — TIMOLOL MALEATE 5 MG/ML
1 SOLUTION/ DROPS OPHTHALMIC DAILY
Status: DISCONTINUED | OUTPATIENT
Start: 2024-04-20 | End: 2024-04-22 | Stop reason: HOSPADM

## 2024-04-20 RX ORDER — ENOXAPARIN SODIUM 100 MG/ML
1 INJECTION SUBCUTANEOUS ONCE
Status: COMPLETED | OUTPATIENT
Start: 2024-04-20 | End: 2024-04-20

## 2024-04-20 RX ORDER — AMLODIPINE BESYLATE 2.5 MG/1
2.5 TABLET ORAL DAILY
Status: DISCONTINUED | OUTPATIENT
Start: 2024-04-20 | End: 2024-04-20

## 2024-04-20 RX ORDER — NITROGLYCERIN 0.4 MG/1
0.4 TABLET SUBLINGUAL
Status: DISCONTINUED | OUTPATIENT
Start: 2024-04-20 | End: 2024-04-22 | Stop reason: HOSPADM

## 2024-04-20 RX ORDER — ACETAMINOPHEN 160 MG/5ML
650 SOLUTION ORAL EVERY 4 HOURS PRN
Status: DISCONTINUED | OUTPATIENT
Start: 2024-04-20 | End: 2024-04-22 | Stop reason: HOSPADM

## 2024-04-20 RX ORDER — SODIUM CHLORIDE 0.9 % (FLUSH) 0.9 %
10 SYRINGE (ML) INJECTION AS NEEDED
Status: DISCONTINUED | OUTPATIENT
Start: 2024-04-20 | End: 2024-04-20

## 2024-04-20 RX ORDER — POLYETHYLENE GLYCOL 3350 17 G/17G
17 POWDER, FOR SOLUTION ORAL DAILY PRN
Status: DISCONTINUED | OUTPATIENT
Start: 2024-04-20 | End: 2024-04-22 | Stop reason: HOSPADM

## 2024-04-20 RX ORDER — AMOXICILLIN 250 MG
2 CAPSULE ORAL 2 TIMES DAILY PRN
Status: DISCONTINUED | OUTPATIENT
Start: 2024-04-20 | End: 2024-04-22 | Stop reason: HOSPADM

## 2024-04-20 RX ORDER — LATANOPROST 50 UG/ML
1 SOLUTION/ DROPS OPHTHALMIC NIGHTLY
Status: DISCONTINUED | OUTPATIENT
Start: 2024-04-20 | End: 2024-04-22 | Stop reason: HOSPADM

## 2024-04-20 RX ORDER — SODIUM CHLORIDE 0.9 % (FLUSH) 0.9 %
10 SYRINGE (ML) INJECTION EVERY 12 HOURS SCHEDULED
Status: DISCONTINUED | OUTPATIENT
Start: 2024-04-20 | End: 2024-04-22 | Stop reason: HOSPADM

## 2024-04-20 RX ORDER — BISACODYL 10 MG
10 SUPPOSITORY, RECTAL RECTAL DAILY PRN
Status: DISCONTINUED | OUTPATIENT
Start: 2024-04-20 | End: 2024-04-22 | Stop reason: HOSPADM

## 2024-04-20 RX ORDER — BUPROPION HYDROCHLORIDE 300 MG/1
300 TABLET ORAL EVERY MORNING
Status: DISCONTINUED | OUTPATIENT
Start: 2024-04-21 | End: 2024-04-22 | Stop reason: HOSPADM

## 2024-04-20 RX ORDER — METOPROLOL SUCCINATE 25 MG/1
25 TABLET, EXTENDED RELEASE ORAL
Status: DISCONTINUED | OUTPATIENT
Start: 2024-04-20 | End: 2024-04-22 | Stop reason: HOSPADM

## 2024-04-20 RX ORDER — THYROID 90 MG/1
90 TABLET ORAL
Status: DISCONTINUED | OUTPATIENT
Start: 2024-04-21 | End: 2024-04-22 | Stop reason: HOSPADM

## 2024-04-20 RX ORDER — SODIUM CHLORIDE 0.9 % (FLUSH) 0.9 %
10 SYRINGE (ML) INJECTION AS NEEDED
Status: DISCONTINUED | OUTPATIENT
Start: 2024-04-20 | End: 2024-04-22 | Stop reason: HOSPADM

## 2024-04-20 RX ADMIN — TIMOLOL MALEATE 1 DROP: 5 SOLUTION OPHTHALMIC at 22:20

## 2024-04-20 RX ADMIN — ENOXAPARIN SODIUM 60 MG: 100 INJECTION SUBCUTANEOUS at 16:47

## 2024-04-20 RX ADMIN — METOPROLOL SUCCINATE 25 MG: 25 TABLET, EXTENDED RELEASE ORAL at 22:36

## 2024-04-20 RX ADMIN — ACETAMINOPHEN 650 MG: 160 SOLUTION ORAL at 20:04

## 2024-04-20 RX ADMIN — MONTELUKAST SODIUM 10 MG: 10 TABLET, FILM COATED ORAL at 22:38

## 2024-04-20 RX ADMIN — NITROGLYCERIN 0.5 INCH: 20 OINTMENT TOPICAL at 16:27

## 2024-04-20 RX ADMIN — ASPIRIN 325 MG: 325 TABLET ORAL at 16:00

## 2024-04-20 RX ADMIN — SODIUM CHLORIDE 1000 ML: 9 INJECTION, SOLUTION INTRAVENOUS at 17:29

## 2024-04-20 RX ADMIN — FLUOXETINE HYDROCHLORIDE 80 MG: 20 CAPSULE ORAL at 22:35

## 2024-04-20 RX ADMIN — Medication 10 ML: at 22:19

## 2024-04-20 NOTE — PROGRESS NOTES
Pt came to [facility] ER with CP which she thought was indigestion. Troponin positive. No acute EKG changes. Transferred to [facility] for further evaluation. Received one dose of lovenox at [facility]. Held metformin and semaglutide (unsure of last dose).  NPO after MN. Echo ordered for Sunday.

## 2024-04-20 NOTE — Clinical Note
A 4 fr sheath was  inserted using micropuncture technique with ultrasound guidance into the right femoral artery. Sheath insertion not delayed. Angio RFA

## 2024-04-20 NOTE — Clinical Note
Level of Care: Telemetry [5]   Diagnosis: Chest pain [094318]   Admitting Physician: LAUREEN HUYNH [[id number]]   Attending Physician: LAUREEN HUYNH [[id number]]

## 2024-04-20 NOTE — FSED PROVIDER NOTE
Subjective   History of Present Illness  63-year-old female that approximately 1 PM to 130 this afternoon she started having epigastric pain similar to her previous GERD heartburn pain.  The pain was persistent so she took Tums and the pain continued and then radiated up to her left chest she continued take more Tums and it radiated to her left arm in the form of numbness.  The episode lasted about an hour to an hour and a half at 6-7/10.  On arrival she is a 3-4 out of 10.  She had a similar episode about a week ago of about a 6-7 out of 10 that lasted less than 30 minutes without including the LUE, but did include epigastric discomfort that went superior into her chest.  She does have a history of heartburn.  Usually responsive to Tums.  She said the week ago when she had the pain that she belched and felt better.  And the pain eventually went away.  There is no family history of MI.  She has never had a heart attack that she knows of before.  She is a recently diagnosed prediabetic based on her Hb 1 AC and is on medication.  She currently does not have pain in her arm which was more like numbness.  As I write this she is now telling me she  has very little epigastric discomfort and no chest pain and no arm numbness. Discomfort maybe a 2/10.      Review of Systems    Past Medical History:   Diagnosis Date    Anemia, iron deficiency     Arthritis     Depression     Gastroesophageal reflux     Glaucoma     History of complete eye exam 37590    [facility]: early glaucoma    History of EKG 03/2006    borderline QT prolonged, NSR    History of MRI of cervical spine 03/29/2011    Multilevel DDD with multilevel spinal steonsis, most severe at C5-6 and C6-7, less prominent at C3-4 and C4-5, there is neuroforaminimal compromise on the right at multiple levels    History of MRI of lumbar spine 03/29/2011    Multilevel DDD most prominent at L4-5 and L5-S1, moderate facet DDD, protrusion in L4-5 and mild disc bulge at L5-S1     Hyperlipemia     Hypertension, essential, benign     Low back pain     Nausea 2015    Reflux esophagitis     Thyroid disorder     Thyroid nodule        Allergies   Allergen Reactions    Carrot [Daucus Carota] Anaphylaxis    Banana Itching     Throat itching      Augmentin [Amoxicillin-Pot Clavulanate] Diarrhea and GI Intolerance       Past Surgical History:   Procedure Laterality Date    APPENDECTOMY      BREAST BIOPSY  2013    left - benign fibroadenoma    CHOLECYSTECTOMY      COLONOSCOPY      COLPOSCOPY      ENDOMETRIAL ABLATION      ENDOSCOPY  2006    Dr. Ruiz: clean based gastric ulcer    ENDOSCOPY N/A 2023    Procedure: ESOPHAGOGASTRODUODENOSCOPY with cold biopsies;  Surgeon: Silver Rose MD;  Location: [facility] END[facility]COPY;  Service: Gastroenterology;  Laterality: N/A;  Pre: dysphagia  Post: normal    HYSTERECTOMY  10/2010    THYROIDECTOMY, PARTIAL Right     TRABECULECTOMY Bilateral     UPPER GASTROINTESTINAL ENDOSCOPY         Family History   Problem Relation Age of Onset    Breast cancer Mother     Kidney cancer Mother     Hypertension Father     Diabetes Brother        Social History     Socioeconomic History    Marital status:    Tobacco Use    Smoking status: Former     Current packs/day: 0.00     Types: Cigarettes     Quit date:      Years since quittin.3    Smokeless tobacco: Never    Tobacco comments:     Started at age 19/Stopped at age 45   Substance and Sexual Activity    Alcohol use: Yes     Comment: rare    Drug use: No    Sexual activity: Defer           Objective   Physical Exam  Vitals and nursing note reviewed.   Constitutional:       Appearance: She is well-developed and normal weight.   HENT:      Head: Atraumatic.   Eyes:      Extraocular Movements: Extraocular movements intact.   Cardiovascular:      Rate and Rhythm: Normal rate and regular rhythm.      Heart sounds: Normal heart sounds. No murmur heard.  Pulmonary:      Effort: Pulmonary  effort is normal.   Chest:      Chest wall: No deformity, tenderness or crepitus.   Abdominal:      Palpations: Abdomen is soft.   Musculoskeletal:         General: Normal range of motion.      Cervical back: Normal range of motion and neck supple.   Skin:     General: Skin is warm and dry.      Capillary Refill: Capillary refill takes less than 2 seconds.   Neurological:      General: No focal deficit present.      Mental Status: She is alert. She is disoriented.   Psychiatric:         Mood and Affect: Mood normal.         Behavior: Behavior normal.         Procedures           ED Course  ED Course as of 04/20/24 1817   Sat Apr 20, 2024   1629 Discussed with Dr. Sania Gallo regarding HPI and past medical history the troponin and the first EKG and the second EKG actually is normal as well.  Placed Nitropaste gave aspirin once Lovenox 1 mg/kg given and transferred to the [facility]. [AR]   1636 Dr Gallo accepted. [AR]   1639 Transfer paperwork completed for going to [facility], she has a bed under Dr. Gallo's name. [AR]   1643 Our instrument is down here for complete comprehensive metabolic panel and a basic metabolic panel has been ordered. [AR]   1645 I discussed with the patient and family the findings of a normal EKG and elevated troponin with that means that likely earlier today between 1 and 130 when she had the pain for nearly an hour she was having a heart attack.  The normal EKG tells me she is past that point and is not currently having a heart attack however she is at a risk of having a repeated heart attack at any moment.  That she needs to be admitted and cardiology needs to see her and decide when to take her to Cath Lab.  She and her family understood and agreed.  This discussion took place about 45 minutes ago. [AR]   1725 Patient's creatinine is less than 0.30; lactate 0.9.  2-hour Trope pending. [AR]   1806 Troponin has doubled to 411, Dr FREDERIC Gallo will be informed by the BB charge RN, ambulance  in route. [AR]   1816 Discussed with Dr. Gallo the troponin findings.  Patient is without pain right now.  An ambulance is just arrived to take her over there I do not want to delay any more care as she is already had aspirin and Nitropaste and has currently no symptoms.  I expect that she says starting at 25 or 30 like most people do she started at 200 and doubled as other people would double from 25-50 or 30-60.  Prefer for her to arrive sooner rather than later for Dr. Gallo to evaluate and make decisions on her care. [AR]      ED Course User Index  [AR] May Aguila MD                HEART Score: 6                            Medical Decision Making  Differential diagnosis includes but not limited to MI, PE, GERD, esophagitis gastritis aortic dissection.    The initial EKG was normal the troponin came back 200 suggesting that the earlier event today was an MI.  Call was made to cardiology spoke with Dr. Sania Gallo to admit and calls out to hospitalist to admit and they expect to take her to the Cath Lab tomorrow unless she changes on admission at some point.    Aspirin and nitro patient was placed and Lovenox 1 mg/kg was given.    While patient is still in the ER we will monitor her closely.        Problems Addressed:  Chest pain at rest: complicated acute illness or injury    Amount and/or Complexity of Data Reviewed  Labs: ordered. Decision-making details documented in ED Course.  Radiology: ordered and independent interpretation performed.     Details: Portable chest x-ray no pneumonia no pleural effusions no pneumothorax silhouette within normal limits.  ECG/medicine tests: ordered and independent interpretation performed.     Details: EKG #1 on arrival normal sinus rhythm rate 85 suspicious for old anterior septal infarct V1 V2 however I do not have an old one to compare to either or if this is related to the chest pain a week ago.  QT interval is 449 and QTc slightly elevated as well as 534  Post  troponin discovery of over 200 repeated the EKG to see where we were at 1619.  Normal sinus rhythm rate 84 still says old anterior infarct less than 1 tiny box in V1 quite possibly related to a week ago or today.  Prolonged QT with a QTc of 517.    Risk  OTC drugs.  Prescription drug management.  Decision regarding hospitalization.  Risk Details: Patient's first EKG suggested an old septal infarct anterior without an active current MI noted however when troponin came back her troponin was 200+.  Suggesting that the event today was an actual MI and she has since returned flow to her heart and it is not showing up on EKG as a significant active movement.  She was already getting aspirin and Nitropaste was placed on her chest in order to help open up the arteries without dropping her blood pressure and stressing her heart more.  She also had Lovenox given 1 mg Per kilogram and repeat EKG to see where she was on her EKG which had not changed from the previous an hour before.  Been monitoring her level of pain for the past hour and discussed with Dr. Sania Gallo the HPI, the current findings by lab and EKG.  She agreed to admit.    Critical Care  Total time providing critical care: 30 minutes        Final diagnoses:   Chest pain at rest       ED Disposition  ED Disposition       ED Disposition   Decision to Admit    Condition   --    Comment   Level of Care: Telemetry [5]   Diagnosis: Chest pain [878079]   Admitting Physician: SANIA GALLO [[id number]]   Attending Physician: SANIA GALLO [[id number]]   Certification: I Certify That Inpatient Hospital Services Are Medically Necessary For Greater Than 2 Midnights                 No follow-up provider specified.       Medication List      No changes were made to your prescriptions during this visit.

## 2024-04-20 NOTE — ED NOTES
"Nursing report ED to floor  Arlene Quesada  63 y.o.  female    HPI :  HPI (Adult)  Stated Reason for Visit: Chest pain  History Obtained From: patient    Chief Complaint  Chief Complaint   Patient presents with    Chest Pain       Admitting doctor:   Sania Gallo MD    Admitting diagnosis:   The encounter diagnosis was Chest pain at rest.    Code status:   Current Code Status       Date Active Code Status Order ID Comments User Context       Not on file            Allergies:   Carrot [daucus carota], Banana, and Augmentin [amoxicillin-pot clavulanate]    Isolation:   No active isolations    Intake and Output  No intake or output data in the 24 hours ending 04/20/24 1710    Weight:       04/20/24  1538   Weight: 61.7 kg (136 lb)       Most recent vitals:   Vitals:    04/20/24 1538 04/20/24 1630 04/20/24 1700   BP: 144/93 129/85 115/73   BP Location: Right arm     Patient Position: Sitting     Pulse: 88 81 86   Resp: 16  15   Temp: 97.6 °F (36.4 °C)     TempSrc: Oral     SpO2: 100%  100%   Weight: 61.7 kg (136 lb)     Height: 160 cm (63\")         Active LDAs/IV Access:   Lines, Drains & Airways       Active LDAs       Name Placement date Placement time Site Days    Peripheral IV 04/20/24 1543 Left Antecubital 04/20/24  1543  Antecubital  less than 1                    Labs (abnormal labs have a star):   Labs Reviewed   TROPONIN - Abnormal; Notable for the following components:       Result Value    HS Troponin T 207 (*)     All other components within normal limits    Narrative:     High Sensitive Troponin T Reference Range:  <14.0 ng/L- Negative Female for AMI  <22.0 ng/L- Negative Male for AMI  >=14 - Abnormal Female indicating possible myocardial injury.  >=22 - Abnormal Male indicating possible myocardial injury.   Clinicians would have to utilize clinical acumen, EKG, Troponin, and serial changes to determine if it is an Acute Myocardial Infarction or myocardial injury due to an underlying chronic " condition.        CBC WITH AUTO DIFFERENTIAL - Abnormal; Notable for the following components:    WBC 12.31 (*)     MCHC 31.4 (*)     Platelets 560 (*)     Lymphocyte % 19.0 (*)     Eosinophil % 8.4 (*)     Neutrophils, Absolute 7.99 (*)     Eosinophils, Absolute 1.04 (*)     All other components within normal limits   POC CHEM PANEL - Abnormal; Notable for the following components:    Glucose 133 (*)     Creatinine <0.30 (*)     CO2 Content 28.7 (*)     All other components within normal limits   POC LACTATE - Normal   COMPREHENSIVE METABOLIC PANEL   HIGH SENSITIVITIY TROPONIN T 2HR   BASIC METABOLIC PANEL   CBC AND DIFFERENTIAL    Narrative:     The following orders were created for panel order CBC & Differential.  Procedure                               Abnormality         Status                     ---------                               -----------         ------                     CBC Auto Differential[[id number]]        Abnormal            Final result                 Please view results for these tests on the individual orders.       EKG:   ECG 12 Lead Chest Pain   Preliminary Result   HEART RATE= 84  bpm   RR Interval= 714  ms   DC Interval= 150  ms   P Horizontal Axis= -5  deg   P Front Axis= 35  deg   QRSD Interval= 86  ms   QT Interval= 437  ms   QTcB= 517  ms   QRS Axis= 20  deg   T Wave Axis= 43  deg   - ABNORMAL ECG -   Sinus rhythm   Anteroseptal infarct, old   Prolonged QT interval   Baseline wander in lead(s) V2,V5,V6   Electronically Signed By:    Date and Time of Study: 2024-04-20 16:19:45      ECG 12 Lead ED Triage Standing Order; Chest Pain   Preliminary Result   HEART RATE= 85  bpm   RR Interval= 706  ms   DC Interval= 144  ms   P Horizontal Axis= 6  deg   P Front Axis= 41  deg   QRSD Interval= 92  ms   QT Interval= 449  ms   QTcB= 534  ms   QRS Axis= 33  deg   T Wave Axis= 33  deg   - ABNORMAL ECG -   Sinus rhythm   Anteroseptal infarct, old   Prolonged QT interval   Electronically Signed By:     Date and Time of Study: 2024 15:36:49          Meds given in ED:   Medications   sodium chloride 0.9 % flush 10 mL (has no administration in time range)   aspirin tablet 325 mg (325 mg Oral Given 24 1600)   nitroglycerin (NITROSTAT) ointment 0.5 inch (0.5 inches Topical Given 24 1627)   Enoxaparin Sodium (LOVENOX) syringe 60 mg (60 mg Subcutaneous Given 24 1647)       Imaging results:  XR Chest 1 View    Result Date: 2024  1. No acute process.   This report was finalized on 2024 4:13 PM by Dr. Varun Tobias M.D on Workstation: [email or web address]       Ambulatory status:   - self    Social issues:   Social History     Socioeconomic History    Marital status:    Tobacco Use    Smoking status: Former     Current packs/day: 0.00     Types: Cigarettes     Quit date:      Years since quittin.3    Smokeless tobacco: Never    Tobacco comments:     Started at age 19/Stopped at age 45   Substance and Sexual Activity    Alcohol use: Yes     Comment: rare    Drug use: No    Sexual activity: Defer       Peripheral Neurovascular  Peripheral Neurovascular (Adult)  Peripheral Neurovascular WDL: WDL    Neuro Cognitive  Neuro Cognitive (Adult)  Cognitive/Neuro/Behavioral WDL: WDL    Learning  Learning Assessment (Adult)  Learning Readiness and Ability: no barriers identified    Respiratory  Respiratory (Adult)  Airway WDL: WDL  Respiratory WDL  Respiratory WDL: WDL    Abdominal Pain       Pain Assessments  Pain (Adult)  (0-10) Pain Rating: Rest: 6  (0-10) Pain Rating: Activity: 7  Pain Description: sharp  Pain Radiation to: shoulder, left    NIH Stroke Scale       Tavon Wadsworth RN  24 17:10 EDT

## 2024-04-21 ENCOUNTER — APPOINTMENT (OUTPATIENT)
Dept: CARDIOLOGY | Facility: HOSPITAL | Age: 64
DRG: 287 | End: 2024-04-21
Payer: COMMERCIAL

## 2024-04-21 PROBLEM — I21.4 NSTEMI, INITIAL EPISODE OF CARE: Status: ACTIVE | Noted: 2024-04-20

## 2024-04-21 LAB
ACT BLD: 152 SECONDS (ref 82–152)
ANION GAP SERPL CALCULATED.3IONS-SCNC: 11 MMOL/L (ref 5–15)
ASCENDING AORTA: 2.8 CM
BH CV ECHO LEFT VENTRICLE GLOBAL LONGITUDINAL STRAIN: -8.7 %
BH CV ECHO MEAS - ACS: 1.36 CM
BH CV ECHO MEAS - AO MAX PG: 5 MMHG
BH CV ECHO MEAS - AO MEAN PG: 2.9 MMHG
BH CV ECHO MEAS - AO V2 MAX: 112.3 CM/SEC
BH CV ECHO MEAS - AO V2 VTI: 21 CM
BH CV ECHO MEAS - AVA(I,D): 1.54 CM2
BH CV ECHO MEAS - EDV(CUBED): 118 ML
BH CV ECHO MEAS - EDV(MOD-SP2): 107 ML
BH CV ECHO MEAS - EDV(MOD-SP4): 110 ML
BH CV ECHO MEAS - EF(MOD-BP): 33.2 %
BH CV ECHO MEAS - EF(MOD-SP2): 25.2 %
BH CV ECHO MEAS - EF(MOD-SP4): 35.5 %
BH CV ECHO MEAS - ESV(CUBED): 50.1 ML
BH CV ECHO MEAS - ESV(MOD-SP2): 80 ML
BH CV ECHO MEAS - ESV(MOD-SP4): 71 ML
BH CV ECHO MEAS - FS: 24.8 %
BH CV ECHO MEAS - IVS/LVPW: 1.09 CM
BH CV ECHO MEAS - IVSD: 1.08 CM
BH CV ECHO MEAS - LAT PEAK E' VEL: 5.4 CM/SEC
BH CV ECHO MEAS - LV DIASTOLIC VOL/BSA (35-75): 67.4 CM2
BH CV ECHO MEAS - LV MASS(C)D: 184.9 GRAMS
BH CV ECHO MEAS - LV MAX PG: 2.22 MMHG
BH CV ECHO MEAS - LV MEAN PG: 1.01 MMHG
BH CV ECHO MEAS - LV SYSTOLIC VOL/BSA (12-30): 43.5 CM2
BH CV ECHO MEAS - LV V1 MAX: 74.4 CM/SEC
BH CV ECHO MEAS - LV V1 VTI: 12.7 CM
BH CV ECHO MEAS - LVIDD: 4.9 CM
BH CV ECHO MEAS - LVIDS: 3.7 CM
BH CV ECHO MEAS - LVOT AREA: 2.5 CM2
BH CV ECHO MEAS - LVOT DIAM: 1.8 CM
BH CV ECHO MEAS - LVPWD: 0.99 CM
BH CV ECHO MEAS - MED PEAK E' VEL: 6.9 CM/SEC
BH CV ECHO MEAS - MR MAX PG: 88.6 MMHG
BH CV ECHO MEAS - MR MAX VEL: 470.6 CM/SEC
BH CV ECHO MEAS - MV A DUR: 0.1 SEC
BH CV ECHO MEAS - MV A MAX VEL: 89.1 CM/SEC
BH CV ECHO MEAS - MV DEC SLOPE: 454 CM/SEC2
BH CV ECHO MEAS - MV DEC TIME: 0.12 SEC
BH CV ECHO MEAS - MV E MAX VEL: 62.4 CM/SEC
BH CV ECHO MEAS - MV E/A: 0.7
BH CV ECHO MEAS - MV MAX PG: 4.1 MMHG
BH CV ECHO MEAS - MV MEAN PG: 2 MMHG
BH CV ECHO MEAS - MV P1/2T: 47.8 MSEC
BH CV ECHO MEAS - MV V2 VTI: 19.2 CM
BH CV ECHO MEAS - MVA(P1/2T): 4.6 CM2
BH CV ECHO MEAS - MVA(VTI): 1.68 CM2
BH CV ECHO MEAS - PA ACC TIME: 0.16 SEC
BH CV ECHO MEAS - PA V2 MAX: 68.8 CM/SEC
BH CV ECHO MEAS - QP/QS: 0.75
BH CV ECHO MEAS - RAP SYSTOLE: 5 MMHG
BH CV ECHO MEAS - RV MAX PG: 1.8 MMHG
BH CV ECHO MEAS - RV V1 MAX: 67.1 CM/SEC
BH CV ECHO MEAS - RV V1 VTI: 14 CM
BH CV ECHO MEAS - RVOT DIAM: 1.48 CM
BH CV ECHO MEAS - RVSP: 30 MMHG
BH CV ECHO MEAS - SUP REN AO DIAM: 1.9 CM
BH CV ECHO MEAS - SV(LVOT): 32.3 ML
BH CV ECHO MEAS - SV(MOD-SP2): 27 ML
BH CV ECHO MEAS - SV(MOD-SP4): 39 ML
BH CV ECHO MEAS - SV(RVOT): 24.1 ML
BH CV ECHO MEAS - SVI(MOD-SP2): 16.6 ML/M2
BH CV ECHO MEAS - SVI(MOD-SP4): 23.9 ML/M2
BH CV ECHO MEAS - TAPSE (>1.6): 2.2 CM
BH CV ECHO MEAS - TR MAX PG: 27.8 MMHG
BH CV ECHO MEAS - TR MAX VEL: 263.8 CM/SEC
BH CV ECHO MEASUREMENTS AVERAGE E/E' RATIO: 10.15
BH CV XLRA - RV BASE: 2.3 CM
BH CV XLRA - RV LENGTH: 4.6 CM
BH CV XLRA - RV MID: 1.67 CM
BH CV XLRA - TDI S': 9.5 CM/SEC
BUN SERPL-MCNC: 10 MG/DL (ref 8–23)
BUN/CREAT SERPL: 15.2 (ref 7–25)
CALCIUM SPEC-SCNC: 8.7 MG/DL (ref 8.6–10.5)
CHLORIDE SERPL-SCNC: 104 MMOL/L (ref 98–107)
CO2 SERPL-SCNC: 24 MMOL/L (ref 22–29)
CREAT SERPL-MCNC: 0.66 MG/DL (ref 0.57–1)
EGFRCR SERPLBLD CKD-EPI 2021: 99.3 ML/MIN/1.73
GLUCOSE SERPL-MCNC: 102 MG/DL (ref 65–99)
LEFT ATRIUM VOLUME INDEX: 28.8 ML/M2
POTASSIUM SERPL-SCNC: 4.5 MMOL/L (ref 3.5–5.2)
QT INTERVAL: 446 MS
QTC INTERVAL: 537 MS
SINUS: 2.5 CM
SODIUM SERPL-SCNC: 139 MMOL/L (ref 136–145)
STJ: 2 CM
TROPONIN T SERPL HS-MCNC: 144 NG/L

## 2024-04-21 PROCEDURE — 25010000002 FENTANYL CITRATE (PF) 50 MCG/ML SOLUTION: Performed by: INTERNAL MEDICINE

## 2024-04-21 PROCEDURE — B2111ZZ FLUOROSCOPY OF MULTIPLE CORONARY ARTERIES USING LOW OSMOLAR CONTRAST: ICD-10-PCS | Performed by: INTERNAL MEDICINE

## 2024-04-21 PROCEDURE — 4A023N7 MEASUREMENT OF CARDIAC SAMPLING AND PRESSURE, LEFT HEART, PERCUTANEOUS APPROACH: ICD-10-PCS | Performed by: INTERNAL MEDICINE

## 2024-04-21 PROCEDURE — B2151ZZ FLUOROSCOPY OF LEFT HEART USING LOW OSMOLAR CONTRAST: ICD-10-PCS | Performed by: INTERNAL MEDICINE

## 2024-04-21 PROCEDURE — C1894 INTRO/SHEATH, NON-LASER: HCPCS | Performed by: INTERNAL MEDICINE

## 2024-04-21 PROCEDURE — 93356 MYOCRD STRAIN IMG SPCKL TRCK: CPT

## 2024-04-21 PROCEDURE — 93458 L HRT ARTERY/VENTRICLE ANGIO: CPT | Performed by: INTERNAL MEDICINE

## 2024-04-21 PROCEDURE — 85347 COAGULATION TIME ACTIVATED: CPT

## 2024-04-21 PROCEDURE — 25510000001 PERFLUTREN (DEFINITY) 8.476 MG IN SODIUM CHLORIDE (PF) 0.9 % 10 ML INJECTION: Performed by: INTERNAL MEDICINE

## 2024-04-21 PROCEDURE — 25010000002 MIDAZOLAM PER 1 MG: Performed by: INTERNAL MEDICINE

## 2024-04-21 PROCEDURE — C1769 GUIDE WIRE: HCPCS | Performed by: INTERNAL MEDICINE

## 2024-04-21 PROCEDURE — 93306 TTE W/DOPPLER COMPLETE: CPT

## 2024-04-21 PROCEDURE — 93306 TTE W/DOPPLER COMPLETE: CPT | Performed by: INTERNAL MEDICINE

## 2024-04-21 PROCEDURE — 25510000001 IOPAMIDOL PER 1 ML: Performed by: INTERNAL MEDICINE

## 2024-04-21 PROCEDURE — 80048 BASIC METABOLIC PNL TOTAL CA: CPT | Performed by: INTERNAL MEDICINE

## 2024-04-21 PROCEDURE — 25010000002 HEPARIN (PORCINE) PER 1000 UNITS: Performed by: INTERNAL MEDICINE

## 2024-04-21 PROCEDURE — 99222 1ST HOSP IP/OBS MODERATE 55: CPT | Performed by: INTERNAL MEDICINE

## 2024-04-21 PROCEDURE — 84484 ASSAY OF TROPONIN QUANT: CPT | Performed by: INTERNAL MEDICINE

## 2024-04-21 PROCEDURE — 93356 MYOCRD STRAIN IMG SPCKL TRCK: CPT | Performed by: INTERNAL MEDICINE

## 2024-04-21 RX ORDER — SODIUM CHLORIDE 9 MG/ML
75 INJECTION, SOLUTION INTRAVENOUS CONTINUOUS
Status: ACTIVE | OUTPATIENT
Start: 2024-04-21 | End: 2024-04-21

## 2024-04-21 RX ORDER — PANTOPRAZOLE SODIUM 40 MG/1
40 TABLET, DELAYED RELEASE ORAL
Status: DISCONTINUED | OUTPATIENT
Start: 2024-04-22 | End: 2024-04-22 | Stop reason: HOSPADM

## 2024-04-21 RX ORDER — VERAPAMIL HYDROCHLORIDE 2.5 MG/ML
INJECTION, SOLUTION INTRAVENOUS
Status: DISCONTINUED | OUTPATIENT
Start: 2024-04-21 | End: 2024-04-21 | Stop reason: HOSPADM

## 2024-04-21 RX ORDER — MIDAZOLAM HYDROCHLORIDE 1 MG/ML
INJECTION INTRAMUSCULAR; INTRAVENOUS
Status: DISCONTINUED | OUTPATIENT
Start: 2024-04-21 | End: 2024-04-21 | Stop reason: HOSPADM

## 2024-04-21 RX ORDER — HEPARIN SODIUM 1000 [USP'U]/ML
INJECTION, SOLUTION INTRAVENOUS; SUBCUTANEOUS
Status: DISCONTINUED | OUTPATIENT
Start: 2024-04-21 | End: 2024-04-21 | Stop reason: HOSPADM

## 2024-04-21 RX ORDER — SODIUM CHLORIDE 9 MG/ML
INJECTION, SOLUTION INTRAVENOUS
Status: COMPLETED | OUTPATIENT
Start: 2024-04-21 | End: 2024-04-21

## 2024-04-21 RX ORDER — ONDANSETRON 2 MG/ML
4 INJECTION INTRAMUSCULAR; INTRAVENOUS EVERY 6 HOURS PRN
Status: DISCONTINUED | OUTPATIENT
Start: 2024-04-21 | End: 2024-04-22 | Stop reason: HOSPADM

## 2024-04-21 RX ORDER — MORPHINE SULFATE 2 MG/ML
1 INJECTION, SOLUTION INTRAMUSCULAR; INTRAVENOUS EVERY 4 HOURS PRN
Status: DISCONTINUED | OUTPATIENT
Start: 2024-04-21 | End: 2024-04-22 | Stop reason: HOSPADM

## 2024-04-21 RX ORDER — LIDOCAINE HYDROCHLORIDE 20 MG/ML
INJECTION, SOLUTION INFILTRATION; PERINEURAL
Status: DISCONTINUED | OUTPATIENT
Start: 2024-04-21 | End: 2024-04-21 | Stop reason: HOSPADM

## 2024-04-21 RX ORDER — FENTANYL CITRATE 50 UG/ML
INJECTION, SOLUTION INTRAMUSCULAR; INTRAVENOUS
Status: DISCONTINUED | OUTPATIENT
Start: 2024-04-21 | End: 2024-04-21 | Stop reason: HOSPADM

## 2024-04-21 RX ORDER — CALCIUM CARBONATE 500 MG/1
2 TABLET, CHEWABLE ORAL 3 TIMES DAILY PRN
Status: DISCONTINUED | OUTPATIENT
Start: 2024-04-21 | End: 2024-04-22 | Stop reason: HOSPADM

## 2024-04-21 RX ORDER — SODIUM CHLORIDE 9 MG/ML
75 INJECTION, SOLUTION INTRAVENOUS CONTINUOUS
Status: DISCONTINUED | OUTPATIENT
Start: 2024-04-21 | End: 2024-04-21

## 2024-04-21 RX ORDER — ACETAMINOPHEN 325 MG/1
650 TABLET ORAL EVERY 4 HOURS PRN
Status: DISCONTINUED | OUTPATIENT
Start: 2024-04-21 | End: 2024-04-22 | Stop reason: HOSPADM

## 2024-04-21 RX ORDER — SODIUM CHLORIDE 9 MG/ML
250 INJECTION, SOLUTION INTRAVENOUS ONCE AS NEEDED
Status: DISCONTINUED | OUTPATIENT
Start: 2024-04-21 | End: 2024-04-22 | Stop reason: HOSPADM

## 2024-04-21 RX ORDER — NALOXONE HCL 0.4 MG/ML
0.4 VIAL (ML) INJECTION
Status: DISCONTINUED | OUTPATIENT
Start: 2024-04-21 | End: 2024-04-22 | Stop reason: HOSPADM

## 2024-04-21 RX ORDER — ONDANSETRON 4 MG/1
4 TABLET, ORALLY DISINTEGRATING ORAL EVERY 6 HOURS PRN
Status: DISCONTINUED | OUTPATIENT
Start: 2024-04-21 | End: 2024-04-22 | Stop reason: HOSPADM

## 2024-04-21 RX ADMIN — ACETAMINOPHEN 650 MG: 325 TABLET, FILM COATED ORAL at 12:18

## 2024-04-21 RX ADMIN — ANTACID TABLETS 2 TABLET: 500 TABLET, CHEWABLE ORAL at 21:10

## 2024-04-21 RX ADMIN — BUPROPION HYDROCHLORIDE 300 MG: 300 TABLET, EXTENDED RELEASE ORAL at 06:25

## 2024-04-21 RX ADMIN — FLUOXETINE HYDROCHLORIDE 80 MG: 20 CAPSULE ORAL at 09:37

## 2024-04-21 RX ADMIN — Medication 10 ML: at 09:37

## 2024-04-21 RX ADMIN — PERFLUTREN 3 ML: 6.52 INJECTION, SUSPENSION INTRAVENOUS at 08:57

## 2024-04-21 RX ADMIN — METOPROLOL SUCCINATE 25 MG: 25 TABLET, EXTENDED RELEASE ORAL at 09:37

## 2024-04-21 RX ADMIN — SODIUM CHLORIDE 75 ML/HR: 9 INJECTION, SOLUTION INTRAVENOUS at 13:50

## 2024-04-21 RX ADMIN — MONTELUKAST SODIUM 10 MG: 10 TABLET, FILM COATED ORAL at 21:10

## 2024-04-21 NOTE — PLAN OF CARE
Goal Outcome Evaluation:  Plan of Care Reviewed With: patient        Progress: no change                                   Plastic Surgeon Procedure Text (A): After obtaining clear surgical margins the patient was sent to plastics for surgical repair.  The patient understands they will receive post-surgical care and follow-up from the referring physician's office.

## 2024-04-21 NOTE — NURSING NOTE
Pt admitted on 04/20/2024 1900 from [facility] for chest pain and high troponin.      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

## 2024-04-22 ENCOUNTER — TELEPHONE (OUTPATIENT)
Dept: ONCOLOGY | Facility: CLINIC | Age: 64
End: 2024-04-22
Payer: COMMERCIAL

## 2024-04-22 ENCOUNTER — READMISSION MANAGEMENT (OUTPATIENT)
Dept: CALL CENTER | Facility: HOSPITAL | Age: 64
End: 2024-04-22
Payer: COMMERCIAL

## 2024-04-22 VITALS
HEIGHT: 63 IN | WEIGHT: 134 LBS | RESPIRATION RATE: 16 BRPM | HEART RATE: 90 BPM | BODY MASS INDEX: 23.74 KG/M2 | SYSTOLIC BLOOD PRESSURE: 103 MMHG | OXYGEN SATURATION: 98 % | TEMPERATURE: 97.4 F | DIASTOLIC BLOOD PRESSURE: 73 MMHG

## 2024-04-22 PROCEDURE — 99239 HOSP IP/OBS DSCHRG MGMT >30: CPT | Performed by: NURSE PRACTITIONER

## 2024-04-22 RX ORDER — METOPROLOL SUCCINATE 25 MG/1
25 TABLET, EXTENDED RELEASE ORAL
Qty: 90 TABLET | Refills: 3 | Status: SHIPPED | OUTPATIENT
Start: 2024-04-23

## 2024-04-22 RX ADMIN — Medication 10 ML: at 08:55

## 2024-04-22 RX ADMIN — METOPROLOL SUCCINATE 25 MG: 25 TABLET, EXTENDED RELEASE ORAL at 08:54

## 2024-04-22 RX ADMIN — FLUOXETINE HYDROCHLORIDE 80 MG: 20 CAPSULE ORAL at 08:54

## 2024-04-22 RX ADMIN — ACETAMINOPHEN 650 MG: 325 TABLET, FILM COATED ORAL at 05:58

## 2024-04-22 RX ADMIN — PANTOPRAZOLE SODIUM 40 MG: 40 TABLET, DELAYED RELEASE ORAL at 05:58

## 2024-04-22 RX ADMIN — TIMOLOL MALEATE 1 DROP: 5 SOLUTION OPHTHALMIC at 08:55

## 2024-04-22 RX ADMIN — BUPROPION HYDROCHLORIDE 300 MG: 300 TABLET, EXTENDED RELEASE ORAL at 05:59

## 2024-04-22 RX ADMIN — LEVOTHYROXINE, LIOTHYRONINE 90 MG: 57; 13.5 TABLET ORAL at 05:59

## 2024-04-22 NOTE — PROGRESS NOTES
[facility] Clinical Pharmacy Services: Patient Counseling Consult    Arlene Quesada and family have been counseled on the following medication: metoprolol XL. Counseling points included the following:    Explained indication of medication, and patient's need for this medication.  Went over dosing and frequency of medication.  Discussed any special administration, storage, or monitoring instructions with medication.  Discussed all important drug interactions, including over-the-counter medications and supplements.  Explained possible side effects for medication.  Instructed the patient not to begin or discontinue any medications without informing his/her physician.    Patient expressed understanding and had no further questions.      Alanna Morataya, Pharm.D., [facility]   Clinical Pharmacist  Phone Extension #[phone]

## 2024-04-22 NOTE — CONSULTS
Met with patient to discuss the benefits of cardiac rehab. Provided phase II information along with the contact information for cardiac rehab here at [facility].Pt undecided where she will attend. Will call after discharge.

## 2024-04-22 NOTE — OUTREACH NOTE
Prep Survey      Flowsheet Row Responses   [facility] patient discharged from? [place]   Is LACE score < 7 ? Yes   Eligibility [facility]   Date of Admission 04/20/24   Date of Discharge 04/22/24   Discharge Disposition Home or Self Care   Discharge diagnosis chest pain, NSTEMI, heart cath   Does the patient have one of the following disease processes/diagnoses(primary or secondary)? Acute MI (STEMI,NSTEMI)   Does the patient have Home health ordered? No   Is there a DME ordered? No   Prep survey completed? Yes            Robyn Ramirez Registered Nurse

## 2024-04-22 NOTE — TELEPHONE ENCOUNTER
Caller: Arlene Quesada    Relationship: Self    Best call back number: [phone]    What is the best time to reach you: ANYTIME    Who are you requesting to speak with (clinical staff, provider,  specific staff member): CLINICAL    What was the call regarding: PT WAS DISCHARGED FROM THE HOSPITAL TODAY (CARDIAC CARE UNIT) AND THEY DID LABS AND SHE WANTS TO MAKE SURE DR MENENDEZ CAN USE THESE LABS FOR TOMORROWS APPT. IF HE NEEDS SOMETHING DIFFERENT SHE WILL BE HERE AT 2:30  PLEASE ADVISE

## 2024-04-22 NOTE — NURSING NOTE
Pt complaint of GERD and back pain overnight.  PRN Tums and Schedule protonix were ordered and pt was given tylenol for pain.  Pt decided to sleep in chair as she felt this was slightly more comfortable

## 2024-04-22 NOTE — CONSULTS
Met with Pt to go over an Advance Directive. Went through the form, filled it out, and answered Pt questions. The form was completed and notarized. Pt got original copy, copy was placed in chart, and a copy was faxed to HIM.

## 2024-04-22 NOTE — PAYOR COMM NOTE
"Eileen Avery (63 y.o. Female)               ATTENTION - INITIAL CLINICALS AUTH PENDING [id number]              REPLY TO UR DEPT  [phone] OR CALL    YOBANI GARCIAMarylu BUTT           Date of Birth   1960    Social Security Number       Address   [address] [place], [place] [address]    Home Phone   [phone]    MRN   [id number]       Sabianist   Confucianist    Marital Status                               Admission Date   24    Admission Type   Urgent    Admitting Provider   Sania Gallo MD    Attending Provider   Sania Gallo MD    Department, Room/Bed   [facility] CARDIOVASC UNIT,        Discharge Date       Discharge Disposition   Home or Self Care    Discharge Destination                                 Attending Provider: Sania Gallo MD    Allergies: Carrot [Daucus Carota], Banana, Augmentin [Amoxicillin-pot Clavulanate]    Isolation: None   Infection: None   Code Status: CPR    Ht: 160 cm (63\")   Wt: 60.8 kg (134 lb)    Admission Cmt: None   Principal Problem: Chest pain [R07.9]                   Active Insurance as of 2024       Primary Coverage       Payor Plan Insurance Group Employer/Plan Group    ANTHEM BLUE CROSS ANTHEM BLUE CROSS BLUE SHIELD PPO [id number]       Payor Plan Address Payor Plan Phone Number Payor Plan Fax Number Effective Dates    [address] [phone]  2022 - None Entered    [place] [address]         Subscriber Name Subscriber Birth Date Member ID       EILEEN AVERY 1960 [id number]                     Emergency Contacts        (Rel.) Home Phone Work Phone Mobile Phone    Damien Avery (Spouse) -- -- [phone]    MANUEL OLIVARES (Friend) [phone] -- [phone]                 History & Physical        Marjorie Phillips MD at 24 31            [facility] History And Physical Assessment    Patient Name: Eileen Avery  Age/Sex: 63 y.o. female  : " 1960  MRN: [id number]    Date of Hospital Admission: 4/20/2024  Date of Encounter Visit: 04/21/24  Encounter Provider: Marjorie Phillips MD  Place of Service: [facility] CARDIOLOGY  Patient Care Team:  Dasia Glez PA-C as PCP - General (Family Medicine)  Scarlett Irby MD as Consulting Physician (Obstetrics)  Rene Haro MD as Consulting Physician (Gastroenterology)  Tim Schmid MD as Consulting Physician (Otolaryngology)  Sofie Salter MD as Consulting Physician (Orthopedic Surgery)  Lorraine Zamora MD as Consulting Physician (Pain Medicine)  Lincoln Leung MD as Consulting Physician (Dermatology)  Filipe Orozco MD as Consulting Physician (Hand Surgery)  Ivonne Graves MD as Consulting Physician (Ophthalmology)  Marlena Cook MD (Psychiatry)  Maria Luisa Donohue MD as Consulting Physician (Gastroenterology)  Maycol Haynes MD as Consulting Physician (Hematology and Oncology)  Dasia Glez PA-C as Referring Physician (Family Medicine)    Subjective:     Chief Complaint: Chest pain    History of Present Illness:  Arlene Quesada is a 63 y.o. female with anxiety, depression, GERD, hypothyroidism, arthritis, hypertension, hyperlipidemia, who presented to the emergency room with complaints of chest pain.    The patient reports that she was folding laundry when she had a sudden onset of substernal chest discomfort that radiated to the left side of her chest and was associated with left-sided arm numbness and shortness of breath.  She reports that she has a long history of GERD with discomfort in a similar area in the middle of her chest.  She usually takes PPI and Tums for this with relief.  However yesterday the symptoms were different that they were much more severe and radiated to the left chest.  She also noted left arm numbness and shortness of breath with the symptoms.  Reports that she was belching a lot with the  episode.  She tried taking Tums without any relief.  She opted to come to the emergency room.    She presented to the [facility] emergency room her EKG was unremarkable.  Vital signs were within normal limits.  CBC showed a mildly elevated white blood count of 12.31.  CMP was unremarkable except for mildly elevated ALT.  Initial troponin was elevated at 207 with a repeat at 411.  She was treated with nitroglycerin paste and given a dose of enoxaparin and transferred here for further management.    Overnight she has had no further chest pain.  Denies any shortness of breath currently.  She denies any prior cardiac history.  She denies any family history of cardiac disease.  She denies any symptoms preceding the episode yesterday.    Past Medical History:  Past Medical History:   Diagnosis Date    Anemia, iron deficiency     Arthritis     Depression     Gastroesophageal reflux     Glaucoma     History of complete eye exam 33643    [facility]: early glaucoma    History of EKG 03/2006    borderline QT prolonged, NSR    History of MRI of cervical spine 03/29/2011    Multilevel DDD with multilevel spinal steonsis, most severe at C5-6 and C6-7, less prominent at C3-4 and C4-5, there is neuroforaminimal compromise on the right at multiple levels    History of MRI of lumbar spine 03/29/2011    Multilevel DDD most prominent at L4-5 and L5-S1, moderate facet DDD, protrusion in L4-5 and mild disc bulge at L5-S1    Hyperlipemia     Hypertension, essential, benign     Low back pain     Nausea 01/23/2015    Reflux esophagitis     Thyroid disorder     Thyroid nodule        Past Surgical History:   Procedure Laterality Date    APPENDECTOMY      BREAST BIOPSY  09/26/2013    left - benign fibroadenoma    CHOLECYSTECTOMY      COLONOSCOPY      COLPOSCOPY      ENDOMETRIAL ABLATION      ENDOSCOPY  03/2006    Dr. Ruiz: clean based gastric ulcer    ENDOSCOPY N/A 12/13/2023    Procedure: ESOPHAGOGASTRODUODENOSCOPY with cold biopsies;   Surgeon: Silver Rose MD;  Location: [facility] ENDOSCOPY;  Service: Gastroenterology;  Laterality: N/A;  Pre: dysphagia  Post: normal    HYSTERECTOMY  10/2010    THYROIDECTOMY, PARTIAL Right     TRABECULECTOMY Bilateral     UPPER GASTROINTESTINAL ENDOSCOPY  2021       Home Medications:   Medications Prior to Admission   Medication Sig Dispense Refill Last Dose    amLODIPine (NORVASC) 2.5 MG tablet Take 1 tablet by mouth Daily. For BP, new dose and stop generic Lotrel 90 tablet 1 4/19/2024    albuterol sulfate  (90 Base) MCG/ACT inhaler Inhale 2 puffs Every 4 (Four) Hours As Needed.   Unknown    benazepril (LOTENSIN) 10 MG tablet Take 1 tablet by mouth Daily. For BP and stop generic Lotrel 90 tablet 1     bimatoprost (LUMIGAN) 0.01 % ophthalmic drops 1 drop Every Night. Instill 1 in affected eye once a day (at bedtime)       buPROPion XL (WELLBUTRIN XL) 300 MG 24 hr tablet Take 1 tablet by mouth Every Morning. For mood 90 tablet 3     Cholecalciferol (VITAMIN D3) 2000 UNITS tablet Take by mouth. Take 1 capsule by oral route daily for 90 days       esomeprazole (nexIUM) 40 MG capsule Take 1 capsule by mouth Every Morning Before Breakfast.       FLUoxetine (PROzac) 40 MG capsule Take 2 capsules by mouth Daily. For depression and anxiety 180 capsule 3     Insulin Pen Needle 32G X 4 MM misc For once daily use with daily injection 100 each 3     LORazepam (ATIVAN) 0.5 MG tablet        metaxalone (SKELAXIN) 800 MG tablet Take 1 tablet by mouth 2 (Two) Times a Day As Needed for Muscle Spasms. 60 tablet 1     metFORMIN ER (GLUCOPHAGE-XR) 500 MG 24 hr tablet 1 p.o. with food for impaired fasting glucose 90 tablet 3     montelukast (SINGULAIR) 10 MG tablet Take 1 tablet by mouth Every Night. For allergies 90 tablet 3     mupirocin (Bactroban) 2 % ointment Apply  topically to the appropriate area as directed 3 (Three) Times a Day. To wound area until healed (Patient not taking: Reported on 2/14/2024) 30 each 1      nitrofurantoin, macrocrystal-monohydrate, (MACROBID) 100 MG capsule Take 1 capsule by mouth Daily. For prophylaxis PRN 30 capsule 5     NP Thyroid 90 MG tablet        Semaglutide-Weight Management (Wegovy) 0.25 MG/0.5ML solution auto-injector Inject 0.25 mg under the skin into the appropriate area as directed 1 (One) Time Per Week. Inject 0.25 mg SQ weekly x4 2 mL 0     terconazole (TERAZOL 3) 0.8 % vaginal cream Insert 1 applicator into the vagina Every Night. X 3 days for yeast (Patient not taking: Reported on 2024) 20 g 5     Testosterone Propionate (FIRST-TESTOSTERONE) 2 % ointment Place  on the skin as directed by provider.       timolol (TIMOPTIC) 0.5 % ophthalmic solution        tobramycin-dexamethasone (TOBRADEX) 0.3-0.1 % ophthalmic suspension        valACYclovir (VALTREX) 1000 MG tablet TAKE 2 TABLETS BY MOUTH AT ONSET FOR FEVER BLISTER. REPEAT THIS DOSE 1 TIME IN 12 HOURS 20 tablet 5     XIIDRA 5 % solution           Allergies:  Allergies   Allergen Reactions    Carrot [Daucus Carota] Anaphylaxis    Banana Itching     Throat itching      Augmentin [Amoxicillin-Pot Clavulanate] Diarrhea and GI Intolerance       Past Social History:  Social History     Socioeconomic History    Marital status:    Tobacco Use    Smoking status: Former     Current packs/day: 0.00     Types: Cigarettes     Quit date:      Years since quittin.3    Smokeless tobacco: Never    Tobacco comments:     Started at age 19/Stopped at age 45   Vaping Use    Vaping status: Never Used   Substance and Sexual Activity    Alcohol use: Yes     Comment: rare    Drug use: No    Sexual activity: Defer       Past Family History:  Family History   Problem Relation Age of Onset    Breast cancer Mother     Kidney cancer Mother     Hypertension Father     Diabetes Brother        Review of Systems:   All systems reviewed. Pertinent positives identified in HPI. All other systems are negative.    Objective:   Temp:  [97.6 °F (36.4  °C)-98 °F (36.7 °C)] 97.7 °F (36.5 °C)  Heart Rate:  [79-88] 79  Resp:  [15-16] 16  BP: (101-144)/(68-93) 101/76    Intake/Output Summary (Last 24 hours) at 4/21/2024 0732  Last data filed at 4/20/2024 2139  Gross per 24 hour   Intake 360 ml   Output --   Net 360 ml     Body mass index is 23.84 kg/m².      04/20/24  1538 04/20/24  1905   Weight: 61.7 kg (136 lb) 61.1 kg (134 lb 9.6 oz)     Weight change:     Physical Exam:   General Appearance:    Alert, cooperative, in no acute distress   Head:    Normocephalic, without obvious abnormality, atraumatic   Eyes:            Lids and lashes normal, conjunctivae and sclerae normal, no   icterus, no pallor, corneas clear, PERRLA   Ears:    Ears appear intact with no abnormalities noted   Neck:   No adenopathy, supple, trachea midline, no thyromegaly, no   carotid bruit, no JVD   Lungs:     Clear to auscultation,respirations regular, even and unlabored    Heart:    Regular rhythm and normal rate, normal S1 and S2, no murmur, no gallop, no rub, no click   Chest Wall:    No abnormalities observed   Abdomen:     Normal bowel sounds, no masses, no organomegaly, soft        non-tender, non-distended, no guarding, no rebound  tenderness   Extremities:   Moves all extremities well, no edema, no cyanosis, no redness   Pulses:   Pulses palpable and equal bilaterally. Normal radial, carotid, femoral, dorsalis pedis and posterior tibial pulses bilaterally. Normal abdominal aorta   Skin:  Psychiatric:   No bleeding, bruising or rash    Alert and oriented x 3, normal mood and affect         Lab Review:   Results from last 7 days   Lab Units 04/20/24  1746 04/20/24  1653 04/20/24  1545   SODIUM mmol/L  --   --  138   POTASSIUM mmol/L  --   --  4.1   CHLORIDE mmol/L  --   --  101   CO2 mmol/L  --   --  25.0   BUN mg/dL  --   --  11   CREATININE mg/dL 0.39* <0.30* 0.75   GLUCOSE mg/dL  --   --  93   CALCIUM mg/dL  --   --  9.5   AST (SGOT) U/L  --   --  25   ALT (SGPT) U/L  --   --  40*      Results from last 7 days   Lab Units 04/20/24  1741 04/20/24  1545   HSTROP T ng/L 411* 207*     Results from last 7 days   Lab Units 04/20/24  1545   WBC 10*3/mm3 12.31*   HEMOGLOBIN g/dL 12.8   HEMATOCRIT % 40.8   PLATELETS 10*3/mm3 560*       Imaging:  Imaging Results (Most Recent)       Procedure Component Value Units Date/Time    XR Chest 1 View [[id number]] Collected: 04/20/24 1612     Updated: 04/20/24 1616    Narrative:      XR CHEST 1 VW-4/20/2024     HISTORY: Chest pain.     Heart size is within normal limits. Lungs appear free of acute  infiltrates. Bones and soft tissues are unremarkable.       Impression:      1. No acute process.        This report was finalized on 4/20/2024 4:13 PM by Dr. Varun Tobias M.D on Workstation: [employer]             I personally viewed and interpreted the patient's EKG    Assessment/Plan:     1.  Non-STEMI.  No EKG changes.  No recurrent symptoms since admission.  2.  Hypertension.  Well-controlled.  3.  Hyperlipidemia.  4.  Diabetes mellitus type 2  5.  Hypothyroidism  6.  Depression/anxiety.    - Continue aspirin, beta-blockers.  - Follow-up on echocardiogram.  - Recommend proceeding with a cardiac catheterization.  Discussed the procedure, risk, benefits at length with the patient and she agrees to proceed.  Will proceed with cardiac catheterization today.    Marjorie Phillips MD  04/21/24  07:32 EDT                      Electronically signed by Marjorie Phillips MD at 04/21/24 1143          Emergency Department Notes        Jarrell Rodrigues, RN at 04/20/24 1820          MD Aguila informed MD Gallo with cardiology of increased troponin of 411    Electronically signed by Jarrell Rodrigues RN at 04/20/24 1821       Tavon Wadsworth, RN at 04/20/24 1814          Tavon RN called to update Maria Luisa MARIA in CVU    Electronically signed by Tavon Wadsworth, RN at 04/20/24 1814       Tavon Wadsworth, RN at 04/20/24 1710          Nursing report ED to floor  Arlene Courtney  "y.o.  female    HPI :  HPI (Adult)  Stated Reason for Visit: Chest pain  History Obtained From: patient    Chief Complaint  Chief Complaint   Patient presents with    Chest Pain       Admitting doctor:   Sania Gallo MD    Admitting diagnosis:   The encounter diagnosis was Chest pain at rest.    Code status:   Current Code Status       Date Active Code Status Order ID Comments User Context       Not on file            Allergies:   Carrot [daucus carota], Banana, and Augmentin [amoxicillin-pot clavulanate]    Isolation:   No active isolations    Intake and Output  No intake or output data in the 24 hours ending 04/20/24 1710    Weight:       04/20/24  1538   Weight: 61.7 kg (136 lb)       Most recent vitals:   Vitals:    04/20/24 1538 04/20/24 1630 04/20/24 1700   BP: 144/93 129/85 115/73   BP Location: Right arm     Patient Position: Sitting     Pulse: 88 81 86   Resp: 16  15   Temp: 97.6 °F (36.4 °C)     TempSrc: Oral     SpO2: 100%  100%   Weight: 61.7 kg (136 lb)     Height: 160 cm (63\")         Active LDAs/IV Access:   Lines, Drains & Airways       Active LDAs       Name Placement date Placement time Site Days    Peripheral IV 04/20/24 1543 Left Antecubital 04/20/24  1543  Antecubital  less than 1                    Labs (abnormal labs have a star):   Labs Reviewed   TROPONIN - Abnormal; Notable for the following components:       Result Value    HS Troponin T 207 (*)     All other components within normal limits    Narrative:     High Sensitive Troponin T Reference Range:  <14.0 ng/L- Negative Female for AMI  <22.0 ng/L- Negative Male for AMI  >=14 - Abnormal Female indicating possible myocardial injury.  >=22 - Abnormal Male indicating possible myocardial injury.   Clinicians would have to utilize clinical acumen, EKG, Troponin, and serial changes to determine if it is an Acute Myocardial Infarction or myocardial injury due to an underlying chronic condition.        CBC WITH AUTO DIFFERENTIAL - Abnormal; " Notable for the following components:    WBC 12.31 (*)     MCHC 31.4 (*)     Platelets 560 (*)     Lymphocyte % 19.0 (*)     Eosinophil % 8.4 (*)     Neutrophils, Absolute 7.99 (*)     Eosinophils, Absolute 1.04 (*)     All other components within normal limits   POC CHEM PANEL - Abnormal; Notable for the following components:    Glucose 133 (*)     Creatinine <0.30 (*)     CO2 Content 28.7 (*)     All other components within normal limits   POC LACTATE - Normal   COMPREHENSIVE METABOLIC PANEL   HIGH SENSITIVITIY TROPONIN T 2HR   BASIC METABOLIC PANEL   CBC AND DIFFERENTIAL    Narrative:     The following orders were created for panel order CBC & Differential.  Procedure                               Abnormality         Status                     ---------                               -----------         ------                     CBC Auto Differential[[id number]]        Abnormal            Final result                 Please view results for these tests on the individual orders.       EKG:   ECG 12 Lead Chest Pain   Preliminary Result   HEART RATE= 84  bpm   RR Interval= 714  ms   DC Interval= 150  ms   P Horizontal Axis= -5  deg   P Front Axis= 35  deg   QRSD Interval= 86  ms   QT Interval= 437  ms   QTcB= 517  ms   QRS Axis= 20  deg   T Wave Axis= 43  deg   - ABNORMAL ECG -   Sinus rhythm   Anteroseptal infarct, old   Prolonged QT interval   Baseline wander in lead(s) V2,V5,V6   Electronically Signed By:    Date and Time of Study: 2024-04-20 16:19:45      ECG 12 Lead ED Triage Standing Order; Chest Pain   Preliminary Result   HEART RATE= 85  bpm   RR Interval= 706  ms   DC Interval= 144  ms   P Horizontal Axis= 6  deg   P Front Axis= 41  deg   QRSD Interval= 92  ms   QT Interval= 449  ms   QTcB= 534  ms   QRS Axis= 33  deg   T Wave Axis= 33  deg   - ABNORMAL ECG -   Sinus rhythm   Anteroseptal infarct, old   Prolonged QT interval   Electronically Signed By:    Date and Time of Study: 2024-04-20 15:36:49           Meds given in ED:   Medications   sodium chloride 0.9 % flush 10 mL (has no administration in time range)   aspirin tablet 325 mg (325 mg Oral Given 24 1600)   nitroglycerin (NITROSTAT) ointment 0.5 inch (0.5 inches Topical Given 24 1627)   Enoxaparin Sodium (LOVENOX) syringe 60 mg (60 mg Subcutaneous Given 24 1647)       Imaging results:  XR Chest 1 View    Result Date: 2024  1. No acute process.   This report was finalized on 2024 4:13 PM by Dr. Varun Tobias M.D on Workstation: [employer]       Ambulatory status:   - self    Social issues:   Social History     Socioeconomic History    Marital status:    Tobacco Use    Smoking status: Former     Current packs/day: 0.00     Types: Cigarettes     Quit date:      Years since quittin.3    Smokeless tobacco: Never    Tobacco comments:     Started at age 19/Stopped at age 45   Substance and Sexual Activity    Alcohol use: Yes     Comment: rare    Drug use: No    Sexual activity: Defer       Peripheral Neurovascular  Peripheral Neurovascular (Adult)  Peripheral Neurovascular WDL: WDL    Neuro Cognitive  Neuro Cognitive (Adult)  Cognitive/Neuro/Behavioral WDL: WDL    Learning  Learning Assessment (Adult)  Learning Readiness and Ability: no barriers identified    Respiratory  Respiratory (Adult)  Airway WDL: WDL  Respiratory WDL  Respiratory WDL: WDL    Abdominal Pain       Pain Assessments  Pain (Adult)  (0-10) Pain Rating: Rest: 6  (0-10) Pain Rating: Activity: 7  Pain Description: sharp  Pain Radiation to: shoulder, left    NIH Stroke Scale       Tavon Wadsworth RN  24 17:10 EDT     Electronically signed by Tavon Wadsworth RN at 24 1710       Jarrell Rodrigues RN at 24 1701           EMS paged for transport    Electronically signed by Jarrell Rodrigues RN at 24 1701       Jarrell Rodrigues, RN at 24 1632          A Dr. Flakito butterfield to return call    Electronically signed by Jarrell Rodrigues RN at  04/20/24 1632       Jarrell Rodrigues RN at 04/20/24 1627          Dr. Gallo returned page at this time    Electronically signed by Jarrell Rodrigues RN at 04/20/24 1627       Jarrell Rodrigues RN at 04/20/24 1618          Page placed to interventional cardiologist per MD Aguila's request    Electronically signed by Jarrell Rodrigues RN at 04/20/24 1618       May Aguila MD at 04/20/24 1617          Subjective   History of Present Illness  63-year-old female that approximately 1 PM to 130 this afternoon she started having epigastric pain similar to her previous GERD heartburn pain.  The pain was persistent so she took Tums and the pain continued and then radiated up to her left chest she continued take more Tums and it radiated to her left arm in the form of numbness.  The episode lasted about an hour to an hour and a half at 6-7/10.  On arrival she is a 3-4 out of 10.  She had a similar episode about a week ago of about a 6-7 out of 10 that lasted less than 30 minutes without including the LUE, but did include epigastric discomfort that went superior into her chest.  She does have a history of heartburn.  Usually responsive to Tums.  She said the week ago when she had the pain that she belched and felt better.  And the pain eventually went away.  There is no family history of MI.  She has never had a heart attack that she knows of before.  She is a recently diagnosed prediabetic based on her Hb 1 AC and is on medication.  She currently does not have pain in her arm which was more like numbness.  As I write this she is now telling me she  has very little epigastric discomfort and no chest pain and no arm numbness. Discomfort maybe a 2/10.      Review of Systems    Past Medical History:   Diagnosis Date    Anemia, iron deficiency     Arthritis     Depression     Gastroesophageal reflux     Glaucoma     History of complete eye exam [id number]    [facility]: early glaucoma    History of EKG 03/2006    borderline QT prolonged, NSR     History of MRI of cervical spine 2011    Multilevel DDD with multilevel spinal steonsis, most severe at C5-6 and C6-7, less prominent at C3-4 and C4-5, there is neuroforaminimal compromise on the right at multiple levels    History of MRI of lumbar spine 2011    Multilevel DDD most prominent at L4-5 and L5-S1, moderate facet DDD, protrusion in L4-5 and mild disc bulge at L5-S1    Hyperlipemia     Hypertension, essential, benign     Low back pain     Nausea 2015    Reflux esophagitis     Thyroid disorder     Thyroid nodule        Allergies   Allergen Reactions    Carrot [Daucus Carota] Anaphylaxis    Banana Itching     Throat itching      Augmentin [Amoxicillin-Pot Clavulanate] Diarrhea and GI Intolerance       Past Surgical History:   Procedure Laterality Date    APPENDECTOMY      BREAST BIOPSY  2013    left - benign fibroadenoma    CHOLECYSTECTOMY      COLONOSCOPY      COLPOSCOPY      ENDOMETRIAL ABLATION      ENDOSCOPY  2006    Dr. Ruiz: clean based gastric ulcer    ENDOSCOPY N/A 2023    Procedure: ESOPHAGOGASTRODUODENOSCOPY with cold biopsies;  Surgeon: Silver Rose MD;  Location: [facility] ENDOSCOPY;  Service: Gastroenterology;  Laterality: N/A;  Pre: dysphagia  Post: normal    HYSTERECTOMY  10/2010    THYROIDECTOMY, PARTIAL Right     TRABECULECTOMY Bilateral     UPPER GASTROINTESTINAL ENDOSCOPY         Family History   Problem Relation Age of Onset    Breast cancer Mother     Kidney cancer Mother     Hypertension Father     Diabetes Brother        Social History     Socioeconomic History    Marital status:    Tobacco Use    Smoking status: Former     Current packs/day: 0.00     Types: Cigarettes     Quit date:      Years since quittin.3    Smokeless tobacco: Never    Tobacco comments:     Started at age 19/Stopped at age 45   Substance and Sexual Activity    Alcohol use: Yes     Comment: rare    Drug use: No    Sexual activity: Defer            Objective   Physical Exam  Vitals and nursing note reviewed.   Constitutional:       Appearance: She is well-developed and normal weight.   HENT:      Head: Atraumatic.   Eyes:      Extraocular Movements: Extraocular movements intact.   Cardiovascular:      Rate and Rhythm: Normal rate and regular rhythm.      Heart sounds: Normal heart sounds. No murmur heard.  Pulmonary:      Effort: Pulmonary effort is normal.   Chest:      Chest wall: No deformity, tenderness or crepitus.   Abdominal:      Palpations: Abdomen is soft.   Musculoskeletal:         General: Normal range of motion.      Cervical back: Normal range of motion and neck supple.   Skin:     General: Skin is warm and dry.      Capillary Refill: Capillary refill takes less than 2 seconds.   Neurological:      General: No focal deficit present.      Mental Status: She is alert. She is disoriented.   Psychiatric:         Mood and Affect: Mood normal.         Behavior: Behavior normal.         Procedures          ED Course  ED Course as of 04/20/24 1817   Sat Apr 20, 2024   1629 Discussed with Dr. Sania Gallo regarding HPI and past medical history the troponin and the first EKG and the second EKG actually is normal as well.  Placed Nitropaste gave aspirin once Lovenox 1 mg/kg given and transferred to the [facility]. [AR]   1636 Dr Gallo accepted. [AR]   1639 Transfer paperwork completed for going to [facility], she has a bed under Dr. Gallo's name. [AR]   1643 Our instrument is down here for complete comprehensive metabolic panel and a basic metabolic panel has been ordered. [AR]   1645 I discussed with the patient and family the findings of a normal EKG and elevated troponin with that means that likely earlier today between 1 and 130 when she had the pain for nearly an hour she was having a heart attack.  The normal EKG tells me she is past that point and is not currently having a heart attack however she is at a risk of having a repeated heart  attack at any moment.  That she needs to be admitted and cardiology needs to see her and decide when to take her to Cath Lab.  She and her family understood and agreed.  This discussion took place about 45 minutes ago. [AR]   1725 Patient's creatinine is less than 0.30; lactate 0.9.  2-hour Trope pending. [AR]   1806 Troponin has doubled to 411, Dr FREDERIC Gallo will be informed by the BB charge RN, ambulance in route. [AR]   1816 Discussed with Dr. Gallo the troponin findings.  Patient is without pain right now.  An ambulance is just arrived to take her over there I do not want to delay any more care as she is already had aspirin and Nitropaste and has currently no symptoms.  I expect that she says starting at 25 or 30 like most people do she started at 200 and doubled as other people would double from 25-50 or 30-60.  Prefer for her to arrive sooner rather than later for Dr. Gallo to evaluate and make decisions on her care. [AR]      ED Course User Index  [AR] May Aguila MD                HEART Score: 6                            Medical Decision Making  Differential diagnosis includes but not limited to MI, PE, GERD, esophagitis gastritis aortic dissection.    The initial EKG was normal the troponin came back 200 suggesting that the earlier event today was an MI.  Call was made to cardiology spoke with Dr. Sania Gallo to admit and calls out to hospitalist to admit and they expect to take her to the Cath Lab tomorrow unless she changes on admission at some point.    Aspirin and nitro patient was placed and Lovenox 1 mg/kg was given.    While patient is still in the ER we will monitor her closely.        Problems Addressed:  Chest pain at rest: complicated acute illness or injury    Amount and/or Complexity of Data Reviewed  Labs: ordered. Decision-making details documented in ED Course.  Radiology: ordered and independent interpretation performed.     Details: Portable chest x-ray no pneumonia no pleural effusions  no pneumothorax silhouette within normal limits.  ECG/medicine tests: ordered and independent interpretation performed.     Details: EKG #1 on arrival normal sinus rhythm rate 85 suspicious for old anterior septal infarct V1 V2 however I do not have an old one to compare to either or if this is related to the chest pain a week ago.  QT interval is 449 and QTc slightly elevated as well as 534  Post troponin discovery of over 200 repeated the EKG to see where we were at 1619.  Normal sinus rhythm rate 84 still says old anterior infarct less than 1 tiny box in V1 quite possibly related to a week ago or today.  Prolonged QT with a QTc of 517.    Risk  OTC drugs.  Prescription drug management.  Decision regarding hospitalization.  Risk Details: Patient's first EKG suggested an old septal infarct anterior without an active current MI noted however when troponin came back her troponin was 200+.  Suggesting that the event today was an actual MI and she has since returned flow to her heart and it is not showing up on EKG as a significant active movement.  She was already getting aspirin and Nitropaste was placed on her chest in order to help open up the arteries without dropping her blood pressure and stressing her heart more.  She also had Lovenox given 1 mg Per kilogram and repeat EKG to see where she was on her EKG which had not changed from the previous an hour before.  Been monitoring her level of pain for the past hour and discussed with Dr. Sania Gallo the HPI, the current findings by lab and EKG.  She agreed to admit.    Critical Care  Total time providing critical care: 30 minutes        Final diagnoses:   Chest pain at rest       ED Disposition  ED Disposition       ED Disposition   Decision to Admit    Condition   --    Comment   Level of Care: Telemetry [5]   Diagnosis: Chest pain [303096]   Admitting Physician: SANIA GALLO [[id number]]   Attending Physician: SANIA GALLO [[id number]]   Certification: I Certify  That Inpatient Hospital Services Are Medically Necessary For Greater Than 2 Midnights                 No follow-up provider specified.       Medication List      No changes were made to your prescriptions during this visit.           Electronically signed by May Aguila MD at 04/20/24 1817       Vital Signs (last 3 days)       Date/Time Temp Temp src Pulse Resp BP Patient Position SpO2    04/22/24 1156 97.4 (36.3) Oral 90 16 103/73 Sitting 98    04/22/24 0809 98.5 (36.9) Oral 91 18 113/76 Lying 96    04/22/24 0437 98.5 (36.9) Oral 93 16 125/83 Sitting 98    04/22/24 0000 98.5 (36.9) Oral 87 16 104/67 Lying 93    04/21/24 1916 98 (36.7) Oral 76 16 104/78 -- 95    04/21/24 1605 97.6 (36.4) Oral 75 16 99/76 Lying 94    04/21/24 1530 -- -- 78 -- -- -- 94    04/21/24 1500 -- -- 77 -- -- -- 93    04/21/24 1445 97.8 (36.6) Oral 74 16 92/72 Lying 93    04/21/24 1417 -- -- 77 -- 103/76 -- 91    04/21/24 1415 -- -- 78 -- 101/82 -- 94    04/21/24 1345 -- -- 75 -- 94/74 -- 94    04/21/24 1315 -- -- 74 -- 108/82 -- 93    04/21/24 1300 -- -- 75 -- 108/75 -- 94    04/21/24 1245 -- -- 75 -- 99/76 -- 96    04/21/24 1217 98.1 (36.7) Oral -- -- -- -- --    04/21/24 1215 -- -- 78 -- 110/82 -- 94    04/21/24 1200 -- -- 75 -- 104/70 -- 93    04/21/24 1145 -- -- 75 -- 111/81 -- 97    04/21/24 1130 -- -- 78 -- 107/75 -- 96    04/21/24 1115 -- -- 73 -- 104/79 -- 95    04/21/24 10:58:05 -- -- -- 16 -- -- --    04/21/24 10:53:22 -- -- -- 16 -- -- --    04/21/24 10:47:52 -- -- -- 16 -- -- --    04/21/24 10:42:03 -- -- -- 14 -- -- --    04/21/24 10:35:42 -- -- -- 14 -- -- --    04/21/24 10:28:52 -- -- -- 14 -- -- --    04/21/24 10:22:49 -- -- -- 14 -- -- --    04/21/24 10:17:57 -- -- -- 14 -- -- --    04/21/24 10:13:15 -- -- -- 16 -- -- --    04/21/24 10:06:41 -- -- -- 14 -- -- --    04/21/24 10:01:45 -- -- -- 16 -- -- --    04/21/24 09:56:48 -- -- -- 18 -- -- --    04/21/24 09:51:49 -- -- -- 20 -- -- --    04/21/24 0802 97.8 (36.6) Oral  79 16 109/75 Sitting 98    04/21/24 0350 97.7 (36.5) Oral 79 16 101/76 Lying --    04/20/24 2345 97.8 (36.6) Oral 87 16 106/68 Lying --    04/20/24 1944 98 (36.7) Oral -- 16 119/91 Lying --    04/20/24 1800 98 (36.7) -- 84 16 116/82 -- 99    04/20/24 1730 -- -- 85 -- 118/82 -- --    04/20/24 1700 -- -- 86 15 115/73 -- 100    04/20/24 1630 -- -- 81 -- 129/85 -- --    04/20/24 1538 97.6 (36.4) Oral 88 16 144/93 Sitting 100          Oxygen Therapy (last 3 days)       Date/Time SpO2 Device (Oxygen Therapy) Flow (L/min) Oxygen Concentration (%) ETCO2 (mmHg)    04/22/24 1156 98 -- -- -- --    04/22/24 0852 -- room air -- -- --    04/22/24 0809 96 -- -- -- --    04/22/24 0437 98 -- -- -- --    04/22/24 0000 93 -- -- -- --    04/21/24 2000 -- room air -- -- --    04/21/24 1916 95 room air -- -- --    04/21/24 1605 94 room air -- -- --    04/21/24 1530 94 room air -- -- --    04/21/24 1500 93 room air -- -- --    04/21/24 1445 93 room air -- -- --    04/21/24 1417 91 room air -- -- --    04/21/24 1415 94 -- -- -- --    04/21/24 1345 94 room air -- -- --    04/21/24 1315 93 room air -- -- --    04/21/24 1300 94 -- -- -- --    04/21/24 1245 96 room air -- -- --    04/21/24 1215 94 room air -- -- --    04/21/24 1200 93 room air -- -- --    04/21/24 1145 97 room air -- -- --    04/21/24 1130 96 room air -- -- --    04/21/24 1115 95 room air -- -- --    04/21/24 10:02:04 -- nasal cannula with ETCO2 3 -- --    04/21/24 0802 98 room air -- -- --    04/20/24 2000 -- room air -- -- --    04/20/24 1944 -- room air -- -- --    04/20/24 1800 99 -- -- -- --    04/20/24 1700 100 -- -- -- --    04/20/24 1538 100 -- -- -- --          Facility-Administered Medications as of 4/22/2024   Medication Dose Route Frequency Provider Last Rate Last Admin    acetaminophen (TYLENOL) 160 MG/5ML oral solution 650 mg  650 mg Oral Q4H PRN Marjorie Phillips MD   650 mg at 04/20/24 2004    acetaminophen (TYLENOL) tablet 650 mg  650 mg Oral Q4H PRN Alan  MD Marjorie   650 mg at 04/22/24 0558    albuterol (PROVENTIL) nebulizer solution 0.083% 2.5 mg/3mL  2.5 mg Nebulization Q6H PRN Marjorie Phillips MD        [COMPLETED] aspirin tablet 325 mg  325 mg Oral Once May Aguila MD   325 mg at 04/20/24 1600    atropine sulfate injection 0.5 mg  0.5 mg Intravenous Q5 Min PRN Marjorie Phillips MD        sennosides-docusate (PERICOLACE) 8.6-50 MG per tablet 2 tablet  2 tablet Oral BID PRN Marjorie Phillips MD        And    polyethylene glycol (MIRALAX) packet 17 g  17 g Oral Daily PRN Marjorie Phillips MD        And    bisacodyl (DULCOLAX) EC tablet 5 mg  5 mg Oral Daily PRN Marjorie Phillips MD        And    bisacodyl (DULCOLAX) suppository 10 mg  10 mg Rectal Daily PRN Marjorie Phillips MD        buPROPion XL (WELLBUTRIN XL) 24 hr tablet 300 mg  300 mg Oral QAM Marjorie Phillips MD   300 mg at 04/22/24 0559    calcium carbonate (TUMS) chewable tablet 500 mg (200 mg elemental)  2 tablet Oral TID PRN Owen Arango PA-C   2 tablet at 04/21/24 2110    [COMPLETED] Enoxaparin Sodium (LOVENOX) syringe 60 mg  1 mg/kg Subcutaneous Once May Aguila MD   60 mg at 04/20/24 1647    FLUoxetine (PROzac) capsule 80 mg  80 mg Oral Daily Marjorie Phillips MD   80 mg at 04/22/24 0854    latanoprost (XALATAN) 0.005 % ophthalmic solution 1 drop  1 drop Both Eyes Nightly Marjorie Phillips MD        LORazepam (ATIVAN) tablet 0.5 mg  0.5 mg Oral Q6H PRN Marjorie Phillips MD        metoprolol succinate XL (TOPROL-XL) 24 hr tablet 25 mg  25 mg Oral Q24H Marjorie Phillips MD   25 mg at 04/22/24 0854    montelukast (SINGULAIR) tablet 10 mg  10 mg Oral Nightly Marjorie Phillips MD   10 mg at 04/21/24 2110    morphine injection 1 mg  1 mg Intravenous Q4H PRN Marjorie Phillips MD        And    naloxone (NARCAN) injection 0.4 mg  0.4 mg Intravenous Q5 Min PRN Marjorie Phillips MD        [COMPLETED] nitroglycerin (NITROSTAT) ointment 0.5 inch  0.5 inch Topical Once May Aguila MD   0.5 inch at  24 1627    nitroglycerin (NITROSTAT) SL tablet 0.4 mg  0.4 mg Sublingual Q5 Min PRN Marjorie Phillips MD        ondansetron ODT (ZOFRAN-ODT) disintegrating tablet 4 mg  4 mg Oral Q6H PRN Marjorie Phillips MD        Or    ondansetron (ZOFRAN) injection 4 mg  4 mg Intravenous Q6H PRN Marjorie Phillips MD        pantoprazole (PROTONIX) EC tablet 40 mg  40 mg Oral Q AM Owen Arango PA-C   40 mg at 24 0558    [COMPLETED] perflutren (DEFINITY) 8.476 mg in Sodium Chloride (PF) 0.9 % 10 mL injection  3 mL Intravenous Once in imaging Sania Gallo MD   3 mL at 24 0857    [COMPLETED] sodium chloride 0.9 % bolus 1,000 mL  1,000 mL Intravenous Once May Aguila MD   Stopped at 24 1818    sodium chloride 0.9 % flush 10 mL  10 mL Intravenous Q12H Marjorie Phillips MD   10 mL at 24 0855    sodium chloride 0.9 % flush 10 mL  10 mL Intravenous PRN Marjorie Phillips MD        sodium chloride 0.9 % infusion 250 mL  250 mL Intravenous Once PRN Marjorie Phillips MD        sodium chloride 0.9 % infusion 40 mL  40 mL Intravenous PRN Marjorie Phillips MD        [COMPLETED] sodium chloride 0.9 % infusion    Code / Trauma / Sedation Continuous Med Marjorie Phillips MD 75 mL/hr at 24 0956 75 mL/hr at 24 0956    [] sodium chloride 0.9 % infusion  75 mL/hr Intravenous Continuous Marjorie Phillips MD   Stopped at 24 2208    Thyroid (ARMOUR) tablet 90 mg  90 mg Oral QAM AC Marjorie Phillips MD   90 mg at 24 0559    timolol (TIMOPTIC) 0.5 % ophthalmic solution 1 drop  1 drop Both Eyes Daily Marjorie Phillips MD   1 drop at 24 0855    tobramycin-dexAMETHasone (TOBRADEX) 0.3-0.1 % ophthalmic suspension 1 drop  1 drop Both Eyes Once Marjorie Phillips MD         Orders (last 72 hrs)        Start     Ordered    24 0000  metoprolol succinate XL (TOPROL-XL) 25 MG 24 hr tablet  Every 24 Hours Scheduled         24 0913    24 0912  Discharge patient  Once          04/22/24 0913    04/22/24 0600  pantoprazole (PROTONIX) EC tablet 40 mg  Every Early Morning         04/21/24 2103    04/21/24 2102  calcium carbonate (TUMS) chewable tablet 500 mg (200 mg elemental)  3 Times Daily PRN         04/21/24 2103    04/21/24 1451  POC Activated Clotting Time  PROCEDURE ONCE         04/21/24 1427    04/21/24 1428  POC Activated Clotting Time  PROCEDURE ONCE         04/21/24 1351    04/21/24 1215  sodium chloride 0.9 % infusion  Continuous         04/21/24 1115    04/21/24 1200  Strict intake and output  Every 4 Hours       04/21/24 1115    04/21/24 1116  Continuous Cardiac Monitoring  Continuous        Comments: Follow Standing Orders As Outlined in Process Instructions (Open Order Report to View Full Instructions)    04/21/24 1115    04/21/24 1116  Maintain IV Access  Continuous         04/21/24 1115    04/21/24 1116  Telemetry - Place Orders & Notify Provider of Results When Patient Experiences Acute Chest Pain, Dysrhythmia or Respiratory Distress  Continuous        Comments: Open Order Report to View Parameters Requiring Provider Notification    04/21/24 1115    04/21/24 1116  May Be Off Telemetry for Tests  Continuous         04/21/24 1115    04/21/24 1116  Encourage fluids  Until Discontinued         04/21/24 1115    04/21/24 1116  Activity - Strict Bed Rest  Until Discontinued         04/21/24 1115    04/21/24 1116  Remove Radial Pressure Device / Dressing  Once         04/21/24 1115    04/21/24 1116  Vital Signs & Reverse Jose's Test (While Radial Compression Device in Place)  Per Order Details        Comments: Every 15 Minutes x4, Every 30 Minutes x4, & Every 1 Hour x2    04/21/24 1115    04/21/24 1116  Keep Affected Arm Straight & Elevated  Continuous         04/21/24 1115    04/21/24 1116  Activity As Tolerated  Until Discontinued         04/21/24 1115    04/21/24 1116  Oxygen Therapy- Nasal Cannula; Titrate 1-6 LPM Per SpO2; 90 - 95%  Continuous         04/21/24 1115     "04/21/24 1116  Continuous Pulse Oximetry  Continuous         04/21/24 1115    04/21/24 1116  Advance Diet As Tolerated -  Until Discontinued         04/21/24 1115    04/21/24 1116  Notify MD if platelet count is less than 100,000, is less than 1/2 baseline, or if Hgb drops by more than 3mg/dl.  Until Discontinued         04/21/24 1115    04/21/24 1116  Notify MD of hypotension (SBP less than 95), bleeding, or dysrythmia and follow Sheath Removal Policy if needed.  Until Discontinued         04/21/24 1115    04/21/24 1116  Hold metFORMIN (GLUCOPHAGE) for 48 Hours  Continuous         04/21/24 1115 04/21/24 1116  Assess Puncture Site, Vital SIgns, Distal Pulses & Observe Site for Bleeding or Swelling  Per Order Details        Comments: Every 15 Minutes x4, Every 30 Minutes x4, & Every 1 Hour x2    04/21/24 1115    04/21/24 1116  Remove Femoral / Brachial Sheaths  Once         04/21/24 1115    04/21/24 1116  Apply Femostop  Per Order Details        Comments: For Groin Bleeding, Notify MD or PA If Applied    04/21/24 1115    04/21/24 1116  Diet: Cardiac, Diabetic; Healthy Heart (2-3 Na+); Consistent Carbohydrate; Fluid Consistency: Thin (IDDSI 0)  Diet Effective Now         04/21/24 1115    04/21/24 1115  acetaminophen (TYLENOL) tablet 650 mg  Every 4 Hours PRN         04/21/24 1115    04/21/24 1115  morphine injection 1 mg  Every 4 Hours PRN        Placed in \"And\" Linked Group    04/21/24 1115    04/21/24 1115  naloxone (NARCAN) injection 0.4 mg  Every 5 Minutes PRN        Placed in \"And\" Linked Group    04/21/24 1115    04/21/24 1115  ondansetron ODT (ZOFRAN-ODT) disintegrating tablet 4 mg  Every 6 Hours PRN        Placed in \"Or\" Linked Group    04/21/24 1115    04/21/24 1115  ondansetron (ZOFRAN) injection 4 mg  Every 6 Hours PRN        Placed in \"Or\" Linked Group    04/21/24 1115    04/21/24 1115  atropine sulfate injection 0.5 mg  Every 5 Minutes PRN         04/21/24 1115    04/21/24 1115  sodium chloride 0.9 % " infusion 250 mL  Once As Needed         04/21/24 1115    04/21/24 1051  iopamidol (ISOVUE-370) 76 % injection  Code / Trauma / Sedation Medication,   Status:  Discontinued         04/21/24 1051    04/21/24 1009  nitroGLYCERIN 200 mcg/mL 30 mL syringe  Code / Trauma / Sedation Medication,   Status:  Discontinued         04/21/24 1009    04/21/24 1009  verapamil (ISOPTIN) injection  Code / Trauma / Sedation Medication,   Status:  Discontinued         04/21/24 1009    04/21/24 1009  heparin (porcine) injection  Code / Trauma / Sedation Medication,   Status:  Discontinued         04/21/24 1009    04/21/24 1007  lidocaine (XYLOCAINE) 2% injection  Code / Trauma / Sedation Medication,   Status:  Discontinued         04/21/24 1008    04/21/24 0958  midazolam (VERSED) injection  Code / Trauma / Sedation Medication,   Status:  Discontinued         04/21/24 0958    04/21/24 0957  fentaNYL citrate (PF) (SUBLIMAZE) injection  Code / Trauma / Sedation Medication,   Status:  Discontinued         04/21/24 0957    04/21/24 0956  sodium chloride 0.9 % infusion  Code / Trauma / Sedation Continuous Med         04/21/24 1001    04/21/24 0945  sodium chloride 0.9 % infusion  Continuous,   Status:  Discontinued         04/21/24 0846    04/21/24 0945  perflutren (DEFINITY) 8.476 mg in Sodium Chloride (PF) 0.9 % 10 mL injection  Once in Imaging         04/21/24 0857    04/21/24 0924  Cardiac Catheterization/Vascular Study  Once         04/21/24 0923    04/21/24 0843  Obtain Informed Consent  Once         04/21/24 0846    04/21/24 0841  Case Request Cath Lab: Left Heart Cath, Coronary angiography  Once         04/21/24 0846    04/21/24 0800  Oral Care  2 Times Daily       04/20/24 1946    04/21/24 0734  Basic Metabolic Panel  Once         04/21/24 0733    04/21/24 0734  High Sensitivity Troponin T  Once         04/21/24 0733    04/21/24 0730  Thyroid (ARMOUR) tablet 90 mg  Every Morning Before Breakfast         04/20/24 1942    04/21/24  "0700  buPROPion XL (WELLBUTRIN XL) 24 hr tablet 300 mg  Every Morning         04/20/24 1942 04/21/24 0646  Inpatient Consult to Advance Care Planning  Once        Provider:  (Not yet assigned)    04/21/24 0645    04/21/24 0001  NPO Diet NPO Type: Strict NPO  Diet Effective Midnight,   Status:  Canceled         04/20/24 1946 04/21/24 0000  Ambulatory Referral to Cardiac Rehab         04/21/24 1058    04/20/24 2100  latanoprost (XALATAN) 0.005 % ophthalmic solution 1 drop  Nightly         04/20/24 1942 04/20/24 2100  montelukast (SINGULAIR) tablet 10 mg  Nightly         04/20/24 1942 04/20/24 2100  sodium chloride 0.9 % flush 10 mL  Every 12 Hours Scheduled         04/20/24 1946 04/20/24 2045  metoprolol succinate XL (TOPROL-XL) 24 hr tablet 25 mg  Every 24 Hours Scheduled         04/20/24 1946 04/20/24 2045  nitroglycerin (NITROSTAT) ointment 0.5 inch  3 Times Daily (Nitrates),   Status:  Discontinued         04/20/24 1954 04/20/24 2030  FLUoxetine (PROzac) capsule 80 mg  Daily         04/20/24 1942 04/20/24 2030  timolol (TIMOPTIC) 0.5 % ophthalmic solution 1 drop  Daily         04/20/24 1942 04/20/24 2030  tobramycin-dexAMETHasone (TOBRADEX) 0.3-0.1 % ophthalmic suspension 1 drop  Once         04/20/24 1942 04/20/24 2030  amLODIPine (NORVASC) tablet 2.5 mg  Daily,   Status:  Discontinued         04/20/24 1942 04/20/24 2000  Vital Signs  Every 4 Hours       04/20/24 1946 04/20/24 1953  ECG 12 Lead Chest Pain  Once         04/20/24 1953 04/20/24 1947  Adult Transthoracic Echo Complete W/ Cont if Necessary Per Protocol  Once         04/20/24 1947 04/20/24 1946  sennosides-docusate (PERICOLACE) 8.6-50 MG per tablet 2 tablet  2 Times Daily PRN        Placed in \"And\" Linked Group    04/20/24 1946 04/20/24 1946  polyethylene glycol (MIRALAX) packet 17 g  Daily PRN        Placed in \"And\" Linked Group    04/20/24 1946 04/20/24 1946  bisacodyl (DULCOLAX) EC tablet 5 mg  Daily " "PRN        Placed in \"And\" Linked Group    04/20/24 1946 04/20/24 1946  bisacodyl (DULCOLAX) suppository 10 mg  Daily PRN        Placed in \"And\" Linked Group    04/20/24 1946 04/20/24 1946  acetaminophen (TYLENOL) 160 MG/5ML oral solution 650 mg  Every 4 Hours PRN         04/20/24 1946 04/20/24 1944  Weigh Patient  Once         04/20/24 1946 04/20/24 1944  Insert Peripheral IV  Once         04/20/24 1946 04/20/24 1944  Saline Lock & Maintain IV Access  Continuous,   Status:  Canceled         04/20/24 1946 04/20/24 1944  Code Status and Medical Interventions:  Continuous         04/20/24 1946 04/20/24 1944  VTE Prophylaxis Not Indicated: Other: Patient Currently Anticoagulated / Receiving Prophylaxis  Once         04/20/24 1946 04/20/24 1944  Continuous Cardiac Monitoring  Continuous,   Status:  Canceled        Comments: Follow Standing Orders As Outlined in Process Instructions (Open Order Report to View Full Instructions)    04/20/24 1946 04/20/24 1944  Maintain IV Access  Continuous,   Status:  Canceled         04/20/24 1946 04/20/24 1944  Telemetry - Place Orders & Notify Provider of Results When Patient Experiences Acute Chest Pain, Dysrhythmia or Respiratory Distress  Continuous        Comments: Open Order Report to View Parameters Requiring Provider Notification    04/20/24 1946 04/20/24 1944  May Be Off Telemetry for Tests  Continuous         04/20/24 1946 04/20/24 1944  Activity - Ad Ynes  Until Discontinued         04/20/24 1946 04/20/24 1944  Diet: Cardiac; Healthy Heart (2-3 Na+); Fluid Consistency: Thin (IDDSI 0)  Diet Effective Now,   Status:  Canceled         04/20/24 1946 04/20/24 1943  nitroglycerin (NITROSTAT) SL tablet 0.4 mg  Every 5 Minutes PRN         04/20/24 1946 04/20/24 1943  sodium chloride 0.9 % flush 10 mL  As Needed         04/20/24 1946 04/20/24 1943  sodium chloride 0.9 % infusion 40 mL  As Needed         04/20/24 1946 04/20/24 1939  " LORazepam (ATIVAN) tablet 0.5 mg  Every 6 Hours PRN         04/20/24 1942    04/20/24 1938  albuterol (PROVENTIL) nebulizer solution 0.083% 2.5 mg/3mL  Every 6 Hours PRN         04/20/24 1942    04/20/24 1749  POC Chem Panel  PROCEDURE ONCE         04/20/24 1746    04/20/24 1745  High Sensitivity Troponin T 2Hr  PROCEDURE ONCE         04/20/24 1609    04/20/24 1745  sodium chloride 0.9 % bolus 1,000 mL  Once         04/20/24 1725    04/20/24 1700  Enoxaparin Sodium (LOVENOX) syringe 60 mg  Once         04/20/24 1631    04/20/24 1659  POC Chem Panel  PROCEDURE ONCE         04/20/24 1653    04/20/24 1659  POC Lactate  PROCEDURE ONCE         04/20/24 1653    04/20/24 1645  nitroglycerin (NITROSTAT) ointment 0.5 inch  Once         04/20/24 1618    04/20/24 1643  Basic Metabolic Panel  Once,   Status:  Canceled         04/20/24 1642    04/20/24 1638  Inpatient Admission  Once         04/20/24 1637    04/20/24 1631  Initiate Observation Status  Once         04/20/24 1635    04/20/24 1617  ECG 12 Lead Chest Pain  Once         04/20/24 1616    04/20/24 1600  aspirin tablet 325 mg  Once         04/20/24 1532    04/20/24 1533  ED Acknowledgement Form Needed;  Once        Comments: Obtain ED acknowledgement form once pt stabilized and has been seen by Provider    04/20/24 1532    04/20/24 1533  NPO Diet NPO Type: Strict NPO  Diet Effective Now,   Status:  Canceled         04/20/24 1532    04/20/24 1533  Undress and Gown  Once         04/20/24 1532    04/20/24 1533  Cardiac Monitoring  Continuous,   Status:  Canceled        Comments: Follow Standing Orders As Outlined in Process Instructions (Open Order Report to View Full Instructions)    04/20/24 1532    04/20/24 1533  Continuous Pulse Oximetry  Continuous,   Status:  Canceled         04/20/24 1532    04/20/24 1533  ECG 12 Lead ED Triage Standing Order; Chest Pain  Once         04/20/24 1532    04/20/24 1533  Insert Peripheral IV  Once         04/20/24 1532    04/20/24 1533   CBC & Differential  Once         04/20/24 1532    04/20/24 1533  Comprehensive Metabolic Panel  Once         04/20/24 1532    04/20/24 1533  High Sensitivity Troponin T  Once         04/20/24 1532    04/20/24 1533  XR Chest 1 View  1 Time Imaging         04/20/24 1532    04/20/24 1533  CBC Auto Differential  PROCEDURE ONCE         04/20/24 1533    04/20/24 1532  Oxygen Therapy- Nasal Cannula; Titrate 1-6 LPM Per SpO2; 90 - 95%  Continuous PRN,   Status:  Canceled       04/20/24 1532    04/20/24 1532  sodium chloride 0.9 % flush 10 mL  As Needed,   Status:  Discontinued         04/20/24 1532    Unscheduled  ECG 12 Lead ED Triage Standing Order; Chest Pain  As Needed      Comments: Persistent, Unrelieved or Recurrent Chest Pain    04/20/24 1532    Unscheduled  Vital Signs  As Needed       04/21/24 1115    Unscheduled  Change site dressing  As Needed       04/21/24 1115    Unscheduled  Head of Bed: Flat; 1 Hour; 1 Hour; Activity Level: Strict Bed Rest; Keep Affected Extremity/ Extremities Straight; Total Bedrest Time? 2 Hours  As Needed       04/21/24 1115                     Physician Progress Notes (last 72 hours)        Sania Gallo MD at 04/20/24 1948          Pt came to [facility] ER with CP which she thought was indigestion. Troponin positive. No acute EKG changes. Transferred to [facility] for further evaluation. Received one dose of lovenox at [facility]. Held metformin and semaglutide (unsure of last dose).  NPO after MN. Echo ordered for Sunday.    Electronically signed by Sania Gallo MD at 04/20/24 1951          Consult Notes (last 72 hours)        Zach Siegel at 04/22/24 1010        Consult Orders    1. Inpatient Consult to Advance Care Planning [[id number]] ordered by Marjorie Phillips MD at 04/21/24 0645                 Met with Pt to go over an Advance Directive. Went through the form, filled it out, and answered Pt questions. The form was completed and notarized. Pt got original copy, copy was  placed in chart, and a copy was faxed to HIM.     Electronically signed by Zach Siegel at [address] [place] [address] CATH LAB  [facility] [id number] [phone]              Cardiac Catheterization/Vascular Study    Accession Number: [id number]   Date of Study: 24   Ordering Provider: Marjorie Phillips MD   Referring: Dasia Glez PA-C   Clinical Indications: NSTEMI, initial episode of care [I21.4 (ICD-10-CM)]        Performing Physician  Performing Staff   Primary: Marjorie Phillips MD    Scrub Person: Brynn Fernandez   Invasive Nurse: Jason Marie Jr. RN   Invasive Nurse: Chrissy Calloway RN   Documenter: Christo Mendenhall         Procedures    Coronary angiography   Left ventriculography   Left Heart Cath        Patient Information    Patient Name  Arlene Quesada MRN  [id number] Legal Sex  Female  (Age)  1960 (63 y.o.)     Race Ethnicity Encounter Category   White or  Not  or  Urgent        [facility] PACS Images     Show images for Cardiac Catheterization/Vascular Study         Printable Vessel Diagram    Printable Vessel Diagram       Physicians    Panel Physicians Referring Physician Case Authorizing Physician   Marjorie Phillips MD (Primary) Dasia Glez PA-C Gondi, Sreedevi, MD       Indications    NSTEMI, initial episode of care [I21.4 (ICD-10-CM)]         Pre Procedure Diagnosis    NSTEMI      Post Procedure Diagnosis    Takotsubo cardiomyopathy        Conclusion    CONCLUSION:  1.  No significant atherosclerotic coronary disease  2.  Takotsubo cardiomyopathy  3.  Elevated left ventricular filling pressures     RECOMMENDATIONS:  Medical management of Takotsubo cardiomyopathy.  Continue metoprolol succinate.  Will try and add an ACE or ARB if blood pressures will tolerate.  Consider diuresis if shows clinical signs of volume overload in light of elevated left ventricular filling pressures.     FINDINGS:  SELECTIVE  CORONARY ANGIOGRAMS:  1.  Left main artery: Large-caliber vessel with 0% stenosis.  Vessel bifurcates into left anterior descending and left circumflex arteries.  2.  Left anterior descending artery: Proximally a large caliber vessel with 0% stenosis.  The mid vessel tapers to a moderate caliber vessel.  Gives rise to a moderate caliber first diagonal branch with no significant disease.  The distal LAD tapers to small caliber with no significant stenosis.  3.  Left circumflex artery: Proximally large-caliber vessel with 0% stenosis.  Proximal vessel gives rise to small caliber first obtuse marginal branch with no significant stenosis.  The mid vessel gives rise to a small caliber second obtuse marginal branch with no significant disease.  The distal continuation circumflex vessel tapers to a moderate caliber giving rise to small caliber third and fourth obtuse marginal branches with no second stenosis.  4.  Right coronary artery: Large-caliber vessel with luminal irregularities of the proximal and distal vessel.  The distal vessel bifurcates into a small caliber posterior descending and a moderate caliber posterolateral branch both with no significant disease.  This is a right dominant system.     LEFT VENTRICULAR HEMODYNAMICS:  1.  Left ventricular pressure: 109/18-25  2.  Aortic pressure: 114/75     LEFT VENTRICULOGRAM:  Normal contractility of the left ventricular basal segments and apex.  The remaining left ventricular wall segments are akinetic to dyskinetic.  The pattern of left ventricular wall motion abnormalities is consistent with Takotsubo cardiomyopathy.  Left ventricular systolic function appears to be severely depressed with a visually estimated ejection fraction of 25 to 30%.     PROCEDURE:  After informed consent was obtained the patient was brought to the cardiac catheterization laboratory where her right wrist was prepped and draped in a sterile manner.  1 mL of 2% lidocaine was infused  subcutaneously to the right wrist.  Access the right radial artery is obtained using a micropuncture needle followed by placement of a 5/6 [place] slender glide sheath.     Selective coronary angiograms were performed to the right coronary artery using a 5 [place] Tiger 4.0 diagnostic catheter.  Attempted selectively cannulating the left coronary arteries with multiple catheters including a 5 [place] FL 3.5, 5 [place] FL 4, Tiger 4.0, AL-1, and a 5 [place] ultimate 1 diagnostic catheters.  All of these were unsuccessful.  At this point the patient developed significant spasm and is having difficulty with my catheter exchanges and manipulation.  For this reason I opted to proceed with right femoral artery access in order to complete the diagnostic angiograms.     The right groin was prepped and draped in a sterile manner.  10 mL of 2% lidocaine was infused subcutaneously to the right groin.  Access to the right common femoral artery is obtained using a micropuncture needle and under ultrasound guidance followed with placement of a 4 [place] angiocatheter.  Selective angiograms performed showing access above the bifurcation and below the origin of the inferior epigastric artery.  A 4 [place] angiocatheter was then exchanged for a 4 [place] sheath.     Again with difficulty was able to cannulate the left coronary artery with a 4 [place] FL 4 diagnostic catheter.  Selective coronary angiograms were then performed.  I then proceeded with a left ventriculogram using a 4 [place] pigtail catheter.  A left heart catheterization was performed earlier using a 5 [place] FR4 diagnostic catheter.     Following completion of the procedure all wires and catheters removed.  The radial sheath was removed and TR band placed.  The femoral sheath was aspirated and flushed.  The patient was then transferred back to the CVU in stable condition.               Procedure Narrative    Risks, benefits and alternatives were discussed with the patient  "and/or family. Plan is for moderate sedation. An immediate assessment was done prior to the administration of moderate sedation. Under my direct supervision, intravenous moderate sedation sedation was administered during the course of this procedure with continuous monitoring of hemodynamic parameters and level of consciousness by an independent trained observer. Less than 20 mL of estimated blood loss during the case. No specimen was collected during the case.       Time Under Physician Observation    Name Panel Role Time Period   Marjorie Phillips MD Panel 1 Primary 4/21/2024 0957 - 4/21/2024 1053      Total Sedation Time    Moderate sedation event time was not documented.                     Vessel Access    A 6 fr sheath was  inserted into the right radial artery. Sheath insertion not delayed. A 4 fr sheath was  inserted using micropuncture technique with ultrasound guidance into the right femoral artery. Sheath insertion not delayed. Angio RFA           Sheath Disposition    Transparent Dressing was used to secure the sheath post procedure.  Sheath Left Intact after the procedure. Hemostasis started on the right radial artery. R-Band was used in achieving hemostasis. Radial compression device applied to vessel. Hemostasis achieved successfully. Closure device additional comment: Tr band with 12cc of air                          Radiation    Time Event Details User   10:51 AM Radiation Tracking Panel 1: Marjorie Phillips MD  Cumulative Air Kerma (mGy) = 331.000; Fluoro Time (min) = 14.1; DAP (mGy-cm2) = 16751.000 TV        Total Contrast        Administrations occurring from 0946 to 1106 on 04/21/24:   Medication Name Total Dose   iopamidol (ISOVUE-370) 76 % injection 117 mL         Patient Hx Of Height, Weight, and Vitals    Height Weight BSA (Calculated - sq m) BMI (Calculated) Retired BMI (kg/m2) Pulse BP   160 cm (63\") 60.8 kg (134 lb) 1.63 sq meters 23.7  79 109/75       Admission Information    Admission " Date/Time Discharge Date/Time Room/Bed   04/20/24  1532  2201/1       Medications  (Filter: Administrations occurring from 0000 to 1111 on 04/21/24)   important  Continuous medications are totaled by the amount administered until 04/21/24 1111.       fentaNYL citrate (PF) (SUBLIMAZE) injection (mcg)   Total dose: 50 mcg  Date/Time Rate/Dose/Volume Action    04/21/24 0957 50 mcg Given      midazolam (VERSED) injection (mg)   Total dose: 2 mg  Date/Time Rate/Dose/Volume Action    04/21/24 0958 1 mg Given    1022 1 mg Given      sodium chloride 0.9 % infusion (mL/hr)   Total dose: Cannot be calculated*  *Continuous medication not stopped within the calculation time range.  Date/Time Rate/Dose/Volume Action    04/21/24 0956 75 mL/hr New Bag      lidocaine (XYLOCAINE) 2% injection (mL)   Total volume: 11 mL  Date/Time Rate/Dose/Volume Action    04/21/24 1007 1 mL Given    1035 10 mL Given      heparin (porcine) injection (Units)   Total dose: 3,000 Units  Date/Time Rate/Dose/Volume Action    04/21/24 1009 3,000 Units Given      verapamil (ISOPTIN) injection (mg)   Total dose: 0.5 mg  Date/Time Rate/Dose/Volume Action    04/21/24 1009 0.5 mg Given      nitroGLYCERIN 200 mcg/mL 30 mL syringe (mcg)   Total dose: 200 mcg  Date/Time Rate/Dose/Volume Action    04/21/24 1009 200 mcg Given      iopamidol (ISOVUE-370) 76 % injection (mL)   Total volume: 117 mL  Date/Time Rate/Dose/Volume Action    04/21/24 1051 117 mL Given      acetaminophen (TYLENOL) 160 MG/5ML oral solution 650 mg (mg)   Total dose: Cannot be calculated* Dosing weight: 61.1  *Administration dose not documented  Date/Time Rate/Dose/Volume Action    04/21/24 0946 *Not included in total MAR Hold      albuterol (PROVENTIL) nebulizer solution 0.083% 2.5 mg/3mL (mg)   Total dose: Cannot be calculated* Dosing weight: 61.1  *Administration dose not documented  Date/Time Rate/Dose/Volume Action    04/21/24 0946 *Not included in total MAR Hold      buPROPion XL  (WELLBUTRIN XL) 24 hr tablet 300 mg (mg)   Total dose: 300 mg  Date/Time Rate/Dose/Volume Action    04/21/24 0625 300 mg Given    0946 *Not included in total MAR Hold      FLUoxetine (PROzac) capsule 80 mg (mg)   Total dose: 80 mg  Date/Time Rate/Dose/Volume Action    04/21/24 0937 80 mg Given    0946 *Not included in total MAR Hold      latanoprost (XALATAN) 0.005 % ophthalmic solution 1 drop (drop)   Total dose: Cannot be calculated* Dosing weight: 61.1  *Administration dose not documented  Date/Time Rate/Dose/Volume Action    04/21/24 0946 *Not included in total MAR Hold      LORazepam (ATIVAN) tablet 0.5 mg (mg)   Total dose: Cannot be calculated*  *Administration dose not documented  Date/Time Rate/Dose/Volume Action    04/21/24 0946 *Not included in total MAR Hold      metoprolol succinate XL (TOPROL-XL) 24 hr tablet 25 mg (mg)   Total dose: 25 mg Dosing weight: 61.1  Date/Time Rate/Dose/Volume Action    04/21/24 0937 25 mg Given      montelukast (SINGULAIR) tablet 10 mg (mg)   Total dose: Cannot be calculated* Dosing weight: 61.1  *Administration dose not documented  Date/Time Rate/Dose/Volume Action    04/21/24 0946 *Not included in total MAR Hold      nitroglycerin (NITROSTAT) SL tablet 0.4 mg (mg)   Total dose: Cannot be calculated* Dosing weight: 61.1  *Administration dose not documented  Date/Time Rate/Dose/Volume Action    04/21/24 0946 *Not included in total MAR Hold      sodium chloride 0.9 % flush 10 mL (mL)   Total volume: 10 mL Dosing weight: 61.1  Date/Time Rate/Dose/Volume Action    04/21/24 0937 10 mL Given    0946 *Not included in total MAR Hold      sodium chloride 0.9 % flush 10 mL (mL)   Total dose: Cannot be calculated* Dosing weight: 61.1  *Administration dose not documented  Date/Time Rate/Dose/Volume Action    04/21/24 0946 *Not included in total MAR Hold      sodium chloride 0.9 % infusion 40 mL (mL)   Total dose: Cannot be calculated* Dosing weight: 61.1  *Administration dose not  documented  Date/Time Rate/Dose/Volume Action    04/21/24 0946 *Not included in total MAR Hold      Thyroid (ARMOUR) tablet 90 mg (mg)   Total dose: Cannot be calculated*  *Administration dose not documented  Date/Time Rate/Dose/Volume Action    04/21/24 0716 *90 mg Not Given    0946 *Not included in total MAR Hold      timolol (TIMOPTIC) 0.5 % ophthalmic solution 1 drop (drop)   Total dose: Cannot be calculated*  *Administration dose not documented  Date/Time Rate/Dose/Volume Action    04/21/24 0946 *Not included in total MAR Hold      tobramycin-dexAMETHasone (TOBRADEX) 0.3-0.1 % ophthalmic suspension 1 drop (drop)   Total dose: Cannot be calculated*  *Administration dose not documented  Date/Time Rate/Dose/Volume Action    04/21/24 0946 *Not included in total MAR Hold      nitroglycerin (NITROSTAT) ointment 0.5 inch (inch)   Total dose: Cannot be calculated* Dosing weight: 61.1  *Administration dose not documented  Date/Time Rate/Dose/Volume Action    04/21/24 0946 *Not included in total MAR Hold        Implants    No implant documentation for this case.       Supplies    Name ID Temporary Type Charge Code Description Charge Code Quantity   KT MANIFLD CARDIAC 302 No Supply HC OR SUPPLY SHELL 272/NO CPT 885833 1   CATH VENT MIV RADL PIG ST TIP 5F 110CM 680 No Diagnostic Catheter HC OR SUPPLY SHELL 272/NO CPT 668640 0   CATH CORNRY OPTITORQUE 1SH TR4 5F 110 51888 No Diagnostic Catheter HC OR SUPPLY SHELL 272/NO CPT 351173 1   DEV COMPR RAD TR BND REG 24CM 20291 No Hemostasis Device HC OR SUPPLY SHELL 272/NO CPT 367352 1   INTRO GLIDESHEATH SLENDER SS 21G .021 6F 10CM 45CM 09504 No Supply HC OR SUPPLY SHELL 272/ 014067 1   CATH DIAG EXPO .045 FL3  5F 100CM 19058 No Diagnostic Catheter HC OR SUPPLY SHELL 272/NO CPT 256974 1   CATH DIAG IMPULSE AL1 5F 100CM 74362 No Diagnostic Catheter HC OR SUPPLY SHELL 272/NO CPT 638015 1   CATH DIAG IMPULSE FR4 5F 100CM 91836 No Diagnostic Catheter HC OR SUPPLY SHELL 272/NO  CPT 497440 1   CATH DIAG IMPULSE FL3.5 5F 100CM 74978 No Diagnostic Catheter HC OR SUPPLY SHELL 272/NO CPT 874172 1   CATH DIAG INFINITI QUICKCARE MICROLP [id number] 77618 No Diagnostic Catheter HC OR SUPPLY SHELL 272/NO CPT 414471 1   GW EMR FIX EXCHG J STD .035 3MM 260CM 53287 No Guidewire HC OR SUPPLY SHELL 272/ 945586 1   KT INTRO MADDY MANDREL NITNL GW/SS 7 REG4F 97912 No Sheath HC OR SUPPLY SHELL 272/ 318769 1   CATH DIAG CARD PERFORMA JL4.0 BT [id number] 06091 No Diagnostic Catheter HC OR SUPPLY SHELL 272/NO CPT 755744 1   CATH DIAG RADL PERFORMA ULTIMATE ULT1 5F .038IN 100CM 14405 No Diagnostic Catheter HC OR SUPPLY SHELL 272/NO CPT 434557 1   PK CATH CARD 40 78289 No Supply   1   SHEATH INTRO PINNACLE PERIPH 0.035 4F 10CM 442695 No Supply HC OR SUPPLY SHELL 272/ 505801 1       Signed    Electronically signed by Marjorie Phillips MD on 4/21/24 at [address] [place], [place] [address]T     Cardiac Catheterization/Vascular Study  Order: [id number]  Status: Final result      Result Care Coordination      Patient Communication     Released  Not seen              Link to Procedure Log    Procedure Log        Order-Level Documents:                   Results Routing Tracking    View Results Routing Information     Order Report     Order Details

## 2024-04-23 ENCOUNTER — TELEPHONE (OUTPATIENT)
Dept: CARDIOLOGY | Facility: CLINIC | Age: 64
End: 2024-04-23

## 2024-04-23 ENCOUNTER — APPOINTMENT (OUTPATIENT)
Dept: OTHER | Facility: HOSPITAL | Age: 64
End: 2024-04-23
Payer: COMMERCIAL

## 2024-04-23 ENCOUNTER — TRANSITIONAL CARE MANAGEMENT TELEPHONE ENCOUNTER (OUTPATIENT)
Dept: CALL CENTER | Facility: HOSPITAL | Age: 64
End: 2024-04-23
Payer: COMMERCIAL

## 2024-04-23 ENCOUNTER — CONSULT (OUTPATIENT)
Dept: ONCOLOGY | Facility: CLINIC | Age: 64
End: 2024-04-23
Payer: COMMERCIAL

## 2024-04-23 VITALS
WEIGHT: 138 LBS | SYSTOLIC BLOOD PRESSURE: 91 MMHG | HEIGHT: 63 IN | BODY MASS INDEX: 24.45 KG/M2 | DIASTOLIC BLOOD PRESSURE: 64 MMHG | OXYGEN SATURATION: 97 % | HEART RATE: 85 BPM | TEMPERATURE: 97.8 F | RESPIRATION RATE: 16 BRPM

## 2024-04-23 DIAGNOSIS — R23.3 EASY BRUISING: Primary | ICD-10-CM

## 2024-04-23 DIAGNOSIS — T14.8XXA BRUISING: ICD-10-CM

## 2024-04-23 DIAGNOSIS — T14.8XXA BRUISING: Primary | ICD-10-CM

## 2024-04-23 LAB
CLOSURE TME COLL+ADP BLD: 82 SECONDS (ref 52–122)
CLOSURE TME COLL+EPINEP BLD: 76 SECONDS (ref 68–148)
FERRITIN SERPL-MCNC: 115 NG/ML (ref 13–150)
FIBRINOGEN PPP-MCNC: 645 MG/DL (ref 219–464)
IRON 24H UR-MRATE: 27 MCG/DL (ref 37–145)
IRON SATN MFR SERPL: 7 % (ref 20–50)
TIBC SERPL-MCNC: 392 MCG/DL (ref 298–536)
TRANSFERRIN SERPL-MCNC: 263 MG/DL (ref 200–360)

## 2024-04-23 PROCEDURE — 99204 OFFICE O/P NEW MOD 45 MIN: CPT | Performed by: INTERNAL MEDICINE

## 2024-04-23 PROCEDURE — 36415 COLL VENOUS BLD VENIPUNCTURE: CPT | Performed by: INTERNAL MEDICINE

## 2024-04-23 PROCEDURE — 85576 BLOOD PLATELET AGGREGATION: CPT | Performed by: INTERNAL MEDICINE

## 2024-04-23 PROCEDURE — 85240 CLOT FACTOR VIII AHG 1 STAGE: CPT | Performed by: INTERNAL MEDICINE

## 2024-04-23 PROCEDURE — 82728 ASSAY OF FERRITIN: CPT | Performed by: INTERNAL MEDICINE

## 2024-04-23 PROCEDURE — 83540 ASSAY OF IRON: CPT | Performed by: INTERNAL MEDICINE

## 2024-04-23 PROCEDURE — 84466 ASSAY OF TRANSFERRIN: CPT | Performed by: INTERNAL MEDICINE

## 2024-04-23 PROCEDURE — 85245 CLOT FACTOR VIII VW RISTOCTN: CPT | Performed by: INTERNAL MEDICINE

## 2024-04-23 PROCEDURE — 85246 CLOT FACTOR VIII VW ANTIGEN: CPT | Performed by: INTERNAL MEDICINE

## 2024-04-23 PROCEDURE — 85384 FIBRINOGEN ACTIVITY: CPT | Performed by: INTERNAL MEDICINE

## 2024-04-23 RX ORDER — NETARSUDIL AND LATANOPROST OPHTHALMIC SOLUTION, 0.02%/0.005% .2; .05 MG/ML; MG/ML
SOLUTION/ DROPS OPHTHALMIC; TOPICAL
COMMUNITY
Start: 2024-03-02

## 2024-04-23 RX ORDER — LEVOTHYROXINE, LIOTHYRONINE 38; 9 UG/1; UG/1
60 TABLET ORAL
COMMUNITY
Start: 2024-03-18

## 2024-04-23 NOTE — LETTER
April 23, 2024     Dasia Glez PA-C  [id number] [facility]  [address]  [place] [id number]    Patient: Arlene Quesada   YOB: 1960   Date of Visit: 4/23/2024     Dear Dasia Glez PA-C:       Thank you for referring Arlene Quesada to me for evaluation. Below are the relevant portions of my assessment and plan of care.    If you have questions, please do not hesitate to call me. I look forward to following Arlene along with you.         Sincerely,        Maycol Haynes MD        CC: No Recipients    Maycol Haynes MD  04/23/24 2025  Signed        REASON FOR CONSULTATION: Easy bruisability  Provide an opinion on any further workup or treatment                             REQUESTING PHYSICIAN: Dasia Glez PA-C    RECORDS OBTAINED:  Records of the patients history including those obtained from the referring provider were reviewed and summarized in detail.    HISTORY OF PRESENT ILLNESS:  The patient is a 63 y.o. year old female who is here for an opinion about the above issue.    History of Present Illness   The patient is a 63-year-old female followed with GE reflux, hypothyroidism, arthritis, hypertension, hyperlipidemia, anxiety and depression.  She had presented to the ER 4/21/2024 with sudden onset substernal chest pain.  She had thought this was her GERD which is often managed with PPI therapy but did not improve with her usual remedies.    She was seen in outside emergency room with EKG is unremarkable, initial troponin elevated 207 and then repeated 411.  She is transferred to [facility] for further evaluation.    Subsequently, A cardiac catheterization was performed that showed no significant atherosclerotic coronary disease.  There is normal contractility of the left ventricular basal segments and apex.  The remaining left ventricular wall segments were akinetic to dyskinetic.  The pattern of the left ventricular wall motion abnormalities was consistent with Takotsubo cardiomyopathy.  Left  ventricular systolic function appeared to be severely depressed with an EF of 25 to 30%.An echocardiogram was also obtained that showed moderately decreased LV systolic function with an EF of 33.2% and wall motion abnormalities suspicious for stress-induced cardiomyopathy.  She was started on guideline directed medical therapy with Toprol-XL.  She was previously on benazepril and amlodipine.  Amlodipine was stopped in light of her reduced heart function and benazepril was placed on hold for relatively low blood pressures.     She is able to discharge 4/23/2024 with groin site without palpable hematoma, off Toprol-XL and held from starting ACE ARB or Entresto given her hypotension.    She had, before this hospitalization, been referred, apparently, for easy bruisability.  This is primarily involves her upper extremities particularly over the last year.  A thorough review of her medications has been obtained as well as her weight loss from treatment with Wegovy not documented at least 40+ pounds over the last year.    Past Medical History:   Diagnosis Date   • Anemia, iron deficiency    • Arthritis    • Depression    • Gastroesophageal reflux    • Glaucoma    • History of complete eye exam [id number]    [facility]: early glaucoma   • History of EKG 03/2006    borderline QT prolonged, NSR   • History of MRI of cervical spine 03/29/2011    Multilevel DDD with multilevel spinal steonsis, most severe at C5-6 and C6-7, less prominent at C3-4 and C4-5, there is neuroforaminimal compromise on the right at multiple levels   • History of MRI of lumbar spine 03/29/2011    Multilevel DDD most prominent at L4-5 and L5-S1, moderate facet DDD, protrusion in L4-5 and mild disc bulge at L5-S1   • Hyperlipemia    • Hypertension, essential, benign    • Low back pain    • Nausea 01/23/2015   • Reflux esophagitis    • Thyroid disorder    • Thyroid nodule     Thyroidectomy- 5-6 years ago    Past Surgical History:   Procedure Laterality Date    • APPENDECTOMY     • BREAST BIOPSY  09/26/2013    left - benign fibroadenoma   • CARDIAC CATHETERIZATION N/A 4/21/2024    Procedure: Left Heart Cath;  Surgeon: Marjorie Phillips MD;  Location:  [facility];  Service: Cardiology;  Laterality: N/A;   • CARDIAC CATHETERIZATION N/A 4/21/2024    Procedure: Coronary angiography;  Surgeon: Marjorie Phillips MD;  Location:  [facility];  Service: Cardiology;  Laterality: N/A;   • CARDIAC CATHETERIZATION N/A 4/21/2024    Procedure: Left ventriculography;  Surgeon: Marjorie Phillips MD;  Location:  [facility];  Service: Cardiology;  Laterality: N/A;   • CHOLECYSTECTOMY     • COLONOSCOPY     • COLPOSCOPY     • ENDOMETRIAL ABLATION     • ENDOSCOPY  03/2006    Dr. Ruiz: clean based gastric ulcer   • ENDOSCOPY N/A 12/13/2023    Procedure: ESOPHAGOGASTRODUODENOSCOPY with cold biopsies;  Surgeon: Silver Rose MD;  Location: [facility] ENDOSCOPY;  Service: Gastroenterology;  Laterality: N/A;  Pre: dysphagia  Post: normal   • HYSTERECTOMY  10/2010   • THYROIDECTOMY, PARTIAL Right    • TRABECULECTOMY Bilateral    • UPPER GASTROINTESTINAL ENDOSCOPY  2021        Current Outpatient Medications on File Prior to Visit   Medication Sig Dispense Refill   • albuterol sulfate  (90 Base) MCG/ACT inhaler Inhale 2 puffs Every 4 (Four) Hours As Needed.     • bimatoprost (LUMIGAN) 0.01 % ophthalmic drops 1 drop Every Night. Instill 1 in affected eye once a day (at bedtime)     • buPROPion XL (WELLBUTRIN XL) 300 MG 24 hr tablet Take 1 tablet by mouth Every Morning. For mood 90 tablet 3   • Cholecalciferol (VITAMIN D3) 2000 UNITS tablet Take by mouth. Take 1 capsule by oral route daily for 90 days     • esomeprazole (nexIUM) 40 MG capsule Take 1 capsule by mouth Every Morning Before Breakfast.     • FLUoxetine (PROzac) 40 MG capsule Take 2 capsules by mouth Daily. For depression and anxiety 180 capsule 3   • Insulin Pen Needle 32G X 4  MM misc For once daily use with daily injection 100 each 3   • LORazepam (ATIVAN) 0.5 MG tablet      • metaxalone (SKELAXIN) 800 MG tablet Take 1 tablet by mouth 2 (Two) Times a Day As Needed for Muscle Spasms. 60 tablet 1   • metFORMIN ER (GLUCOPHAGE-XR) 500 MG 24 hr tablet 1 p.o. with food for impaired fasting glucose 90 tablet 3   • metoprolol succinate XL (TOPROL-XL) 25 MG 24 hr tablet Take 1 tablet by mouth Daily. 90 tablet 3   • montelukast (SINGULAIR) 10 MG tablet Take 1 tablet by mouth Every Night. For allergies 90 tablet 3   • mupirocin (Bactroban) 2 % ointment Apply  topically to the appropriate area as directed 3 (Three) Times a Day. To wound area until healed 30 each 1   • NP Thyroid 60 MG tablet Take 1 tablet by mouth.     • NP Thyroid 90 MG tablet      • Rocklatan 0.02-0.005 % solution      • Semaglutide-Weight Management (Wegovy) 0.25 MG/0.5ML solution auto-injector Inject 0.25 mg under the skin into the appropriate area as directed 1 (One) Time Per Week. Inject 0.25 mg SQ weekly x4 2 mL 0   • terconazole (TERAZOL 3) 0.8 % vaginal cream Insert 1 applicator into the vagina Every Night. X 3 days for yeast 20 g 5   • Testosterone Propionate (FIRST-TESTOSTERONE) 2 % ointment Place  on the skin as directed by provider.     • timolol (TIMOPTIC) 0.5 % ophthalmic solution      • tobramycin-dexamethasone (TOBRADEX) 0.3-0.1 % ophthalmic suspension      • valACYclovir (VALTREX) 1000 MG tablet TAKE 2 TABLETS BY MOUTH AT ONSET FOR FEVER BLISTER. REPEAT THIS DOSE 1 TIME IN 12 HOURS 20 tablet 5   • XIIDRA 5 % solution        Current Facility-Administered Medications on File Prior to Visit   Medication Dose Route Frequency Provider Last Rate Last Admin   • [DISCONTINUED] acetaminophen (TYLENOL) 160 MG/5ML oral solution 650 mg  650 mg Oral Q4H PRN Marjorie Phillips MD   650 mg at 04/20/24 2004   • [DISCONTINUED] acetaminophen (TYLENOL) tablet 650 mg  650 mg Oral Q4H PRN Marjorie Phillips MD   650 mg at 04/22/24 0558   •  [DISCONTINUED] albuterol (PROVENTIL) nebulizer solution 0.083% 2.5 mg/3mL  2.5 mg Nebulization Q6H PRN Marjorie Phillips MD       • [DISCONTINUED] atropine sulfate injection 0.5 mg  0.5 mg Intravenous Q5 Min PRN Marjorie Phillips MD       • [DISCONTINUED] bisacodyl (DULCOLAX) EC tablet 5 mg  5 mg Oral Daily PRN Marjorie Phillips MD       • [DISCONTINUED] bisacodyl (DULCOLAX) suppository 10 mg  10 mg Rectal Daily PRN Marjorie Phillips MD       • [DISCONTINUED] buPROPion XL (WELLBUTRIN XL) 24 hr tablet 300 mg  300 mg Oral QAM Marjorie Phillips MD   300 mg at 04/22/24 0559   • [DISCONTINUED] calcium carbonate (TUMS) chewable tablet 500 mg (200 mg elemental)  2 tablet Oral TID PRN Owen Arango PA-C   2 tablet at 04/21/24 2110   • [DISCONTINUED] FLUoxetine (PROzac) capsule 80 mg  80 mg Oral Daily Marjorie Phillips MD   80 mg at 04/22/24 0854   • [DISCONTINUED] latanoprost (XALATAN) 0.005 % ophthalmic solution 1 drop  1 drop Both Eyes Nightly Marjorie Phillips MD       • [DISCONTINUED] LORazepam (ATIVAN) tablet 0.5 mg  0.5 mg Oral Q6H PRN Marjorie Phillips MD       • [DISCONTINUED] metoprolol succinate XL (TOPROL-XL) 24 hr tablet 25 mg  25 mg Oral Q24H Marjorie Phillips MD   25 mg at 04/22/24 0854   • [DISCONTINUED] montelukast (SINGULAIR) tablet 10 mg  10 mg Oral Nightly Marjorie Phillips MD   10 mg at 04/21/24 2110   • [DISCONTINUED] morphine injection 1 mg  1 mg Intravenous Q4H PRN Marjorie Phillips MD       • [DISCONTINUED] naloxone (NARCAN) injection 0.4 mg  0.4 mg Intravenous Q5 Min PRN Marjorie Phillips MD       • [DISCONTINUED] nitroglycerin (NITROSTAT) SL tablet 0.4 mg  0.4 mg Sublingual Q5 Min PRN Marjorie Phillips MD       • [DISCONTINUED] ondansetron (ZOFRAN) injection 4 mg  4 mg Intravenous Q6H PRN Marjorie Phillips MD       • [DISCONTINUED] ondansetron ODT (ZOFRAN-ODT) disintegrating tablet 4 mg  4 mg Oral Q6H PRN Marjorie Phillips MD       • [DISCONTINUED] pantoprazole (PROTONIX) EC tablet 40 mg  40 mg Oral Q AM  Owen Arango PA-C   40 mg at 24 0558   • [DISCONTINUED] polyethylene glycol (MIRALAX) packet 17 g  17 g Oral Daily PRN Marjorie Phillips MD       • [DISCONTINUED] sennosides-docusate (PERICOLACE) 8.6-50 MG per tablet 2 tablet  2 tablet Oral BID PRN Marjorie Phillips MD       • [DISCONTINUED] sodium chloride 0.9 % flush 10 mL  10 mL Intravenous Q12H Marjorie Phillips MD   10 mL at 24 0855   • [DISCONTINUED] sodium chloride 0.9 % flush 10 mL  10 mL Intravenous PRN Marjorie Phillips MD       • [DISCONTINUED] sodium chloride 0.9 % infusion 250 mL  250 mL Intravenous Once PRN Marjorie Phillips MD       • [DISCONTINUED] sodium chloride 0.9 % infusion 40 mL  40 mL Intravenous PRN Marjorie Phillips MD       • [DISCONTINUED] Thyroid (ARMOUR) tablet 90 mg  90 mg Oral QAM AC Marjorie Phillips MD   90 mg at 24 0559   • [DISCONTINUED] timolol (TIMOPTIC) 0.5 % ophthalmic solution 1 drop  1 drop Both Eyes Daily Marjorie Phillips MD   1 drop at 24 0855   • [DISCONTINUED] tobramycin-dexAMETHasone (TOBRADEX) 0.3-0.1 % ophthalmic suspension 1 drop  1 drop Both Eyes Once Marjorie Phillips MD            ALLERGIES:    Allergies   Allergen Reactions   • Carrot [Daucus Carota] Anaphylaxis   • Banana Itching     Throat itching     • Augmentin [Amoxicillin-Pot Clavulanate] Diarrhea and GI Intolerance        Social History     Socioeconomic History   • Marital status:    Tobacco Use   • Smoking status: Former     Current packs/day: 0.00     Types: Cigarettes     Quit date:      Years since quittin.3   • Smokeless tobacco: Never   • Tobacco comments:     Started at age 19/Stopped at age 45   Vaping Use   • Vaping status: Never Used   Substance and Sexual Activity   • Alcohol use: Yes     Comment: rare   • Drug use: No   • Sexual activity: Defer        Family History   Problem Relation Age of Onset   • Breast cancer Mother    • Kidney cancer Mother    • Hypertension Father    • Diabetes Brother      "    Review of Systems   Constitutional:  Positive for unexpected weight change (42 lbs-7 mos, Wegovy).   HENT:  Positive for hearing loss (Left), sinus pressure, sinus pain and trouble swallowing (?).    Eyes:         Glaucoma   Respiratory:  Positive for choking and shortness of breath.    Cardiovascular:  Positive for chest pain.   Gastrointestinal:         Reflux, H/H   Musculoskeletal:  Positive for back pain and neck pain.   Allergic/Immunologic: Positive for environmental allergies.   Hematological:  Bruises/bleeds easily.   Psychiatric/Behavioral:  Positive for decreased concentration.         Objective    Vitals:    04/23/24 1523   BP: 91/64   Pulse: 85   Resp: 16   Temp: 97.8 °F (36.6 °C)   TempSrc: Temporal   SpO2: 97%   Weight: 62.6 kg (138 lb)   Height: 160 cm (62.99\")   PainSc: 0-No pain         4/23/2024     3:12 PM   Current Status   ECOG score 0       Physical Exam  Constitutional:       Appearance: Normal appearance.   HENT:      Head: Normocephalic and atraumatic.      Nose: Nose normal.      Mouth/Throat:      Mouth: Mucous membranes are moist.      Pharynx: Oropharynx is clear.   Eyes:      Extraocular Movements: Extraocular movements intact.      Conjunctiva/sclera: Conjunctivae normal.      Pupils: Pupils are equal, round, and reactive to light.   Cardiovascular:      Rate and Rhythm: Normal rate and regular rhythm.      Pulses: Normal pulses.      Heart sounds: Normal heart sounds.   Pulmonary:      Effort: Pulmonary effort is normal.      Breath sounds: Normal breath sounds.   Abdominal:      General: Bowel sounds are normal.      Palpations: Abdomen is soft.   Musculoskeletal:         General: Swelling present.      Cervical back: Normal range of motion and neck supple.   Skin:     General: Skin is warm and dry.      Findings: Bruising (Mild ecchymoses upper extremities bilaterally) present.   Neurological:      General: No focal deficit present.      Mental Status: She is oriented to " person, place, and time.   Psychiatric:         Mood and Affect: Mood normal.         Behavior: Behavior normal.           RECENT LABS:  Hematology WBC   Date Value Ref Range Status   04/20/2024 12.31 (H) 3.40 - 10.80 10*3/mm3 Final   02/14/2024 9.4 3.4 - 10.8 x10E3/uL Final     RBC   Date Value Ref Range Status   04/20/2024 4.44 3.77 - 5.28 10*6/mm3 Final   02/14/2024 4.12 3.77 - 5.28 x10E6/uL Final     Hemoglobin   Date Value Ref Range Status   04/20/2024 12.8 12.0 - 15.9 g/dL Final     Hematocrit   Date Value Ref Range Status   04/20/2024 40.8 34.0 - 46.6 % Final     Platelets   Date Value Ref Range Status   04/20/2024 560 (H) 140 - 450 10*3/mm3 Final          Assessment & Plan     Patient is a 63-year-old female we are asked to see for easy bruising.  She had just been hospitalized 4/20-21/24 having a medical history inclusive of anxiety, depression, reflux, hypothyroidism, arthritis, hyperlipidemia, hypertension who presented to ER with complaints of chest pain.  This happened while she was folding laundry when she had sudden onset of chest pain and left arm numbness.  She will have similar symptoms usually improve with PPI and Tums but this became far more severe.  She presented to an outside emergency room with unremarkable EKG, white count 12,300, troponin of 207, repeat of 411.  She is to have nitroglycerin, treated with enoxaparin and transferred for further assessment.     cardiac catheterization was performed that showed no significant atherosclerotic coronary disease.  There is normal contractility of the left ventricular basal segments and apex.  The remaining left ventricular wall segments were akinetic to dyskinetic.  The pattern of the left ventricular wall motion abnormalities was consistent with Takotsubo cardiomyopathy.  Left ventricular systolic function appeared to be severely depressed with an EF of 25 to 30%.An echocardiogram was also obtained that showed moderately decreased LV systolic  function with an EF of 33.2% and wall motion abnormalities suspicious for stress-induced cardiomyopathy.      Additional studies include a normal PT, PTT and activated clotting time.  Additional studies demonstrated a modest degree of thrombocytosis documented 5 years ago and variable through her recent hospitalization.    She was able to discharge 4/23/2024 with groin site without palpable hematoma, off Toprol-XL and held from starting ACE ARB or Entresto given her hypotension.    She had, before this hospitalization, been referred, apparently, for easy bruisability.  This is primarily involves her upper extremities particularly over the last year.  A thorough review of her medications has been obtained as well as her weight loss from treatment with Wegovy not documented at least 40+ pounds over the last year.    We have discussed that her findings are limited to her upper extremities and would suggest weight loss and loss of subcutaneous fat in her arms with concern that her current additional medications particularly high-dose Prozac.  In discussing this with her she states that she clearly feels better on this medication and does not wish to discontinue it.  Her thrombocytosis also needs to be investigated to some degree to determine whether it could be associated with qualitative platelet defect as well.    Plan:  *Patient to return to laboratory today for ferritin, iron profile, fibrinogen, platelet function testing, and von Willebrand's panel    *MD follow-up 3 to 5 weeks

## 2024-04-23 NOTE — DISCHARGE SUMMARY
Patient Name: Arlene Quesada  :1960  64 y.o.    Date of Admit: 2024  Date of Discharge:  2024    Discharge Diagnosis:  Problems Addressed this Visit       NSTEMI, initial episode of care    Relevant Medications    metoprolol succinate XL (TOPROL-XL) 25 MG 24 hr tablet    Other Relevant Orders    Case Request Cath Lab: Left Heart Cath, Coronary angiography (Completed)    Cardiac Catheterization/Vascular Study (Completed)    Ambulatory Referral to Cardiac Rehab     Other Visit Diagnoses       Chest pain at rest    -  Primary          Diagnoses         Codes Comments    Chest pain at rest    -  Primary ICD-10-CM: R07.9  ICD-9-CM: 786.50     NSTEMI, initial episode of care     ICD-10-CM: I21.4  ICD-9-CM: 410.71             Hospital Course:   This is a 63-year-old female with a past medical history of anxiety, depression, GERD, hypothyroidism, arthritis, hypertension, hyperlipidemia.  She presented to the emergency room on 2024 with complaints of sudden onset substernal chest discomfort that radiated to the left side of her chest and was associated with left sided arm numbness and shortness of breath.  She reported that the discomfort was similar to what she typically experiences with GERD.  Usually she will take a PPI in times which will relieve her discomfort.  However with this particular episode the discomfort was much more severe and radiated to the left chest.  She tried taking Tums without any relief.  She also reported that she was belching a lot and had left arm numbness and shortness of breath.    In the emergency room at [facility] her EKG was unremarkable and her vital signs were within normal limits.  Her initial troponin was elevated at 207 with a repeat of 411.  She was transferred to [facility] for further evaluation.    A cardiac catheterization was performed that showed no significant atherosclerotic coronary disease.  There is normal contractility of the left  "ventricular basal segments and apex.  The remaining left ventricular wall segments were akinetic to dyskinetic.  The pattern of the left ventricular wall motion abnormalities was consistent with Takotsubo cardiomyopathy.  Left ventricular systolic function appeared to be severely depressed with an EF of 25 to 30%.An echocardiogram was also obtained that showed moderately decreased LV systolic function with an EF of 33.2% and wall motion abnormalities suspicious for stress-induced cardiomyopathy.  She was started on guideline directed medical therapy with Toprol-XL.  She was previously on benazepril and amlodipine.  Amlodipine was stopped in light of her reduced heart function and benazepril was placed on hold for relatively low blood pressures.    She is stable for discharge home today.  She denies any chest discomforts or shortness of breath.  She has ambulated without any difficulties.  Her right groin site is soft with no palpable hematoma.  She will remain on Toprol-XL but we will hold off on initiating an ACE ARB or Entresto for the time being given her lower blood pressure readings.  This will be addressed at her follow-up with me in 1 week.  We will plan to repeat an echocardiogram in about 2 to 3 months to reassess her cardiomyopathy.  He does plan to attend cardiac rehab as well.        Procedures Performed  Procedure(s):  Left Heart Cath  Coronary angiography  Left ventriculography       Consults       No orders found from 3/22/2024 to 4/21/2024.            Pertinent Test Results:         Invalid input(s): \"LABALBU\", \"PROT\"          @LABRCNT(bnp)@                      Assessment/Plan;  1.  Non-STEMI.  No EKG changes.  Normal coronaries. Wall motion abnormalities consistent with Takotsubo cardiomyopathy.  2. Takotsubo CM: GDMT with Toprol XL. BP too soft for ACE/ARB/ARNI or spironolactone. Will reassess at follow up and optimize GDMT as tolerated.  3.  Hypertension  4  Hyperlipidemia.  5.  Diabetes mellitus " type 2  6.  Hypothyroidism  7.  Depression/anxiety.     Condition on Discharge: stable    Discharge Medications     Discharge Medications        New Medications        Instructions Start Date   metoprolol succinate XL 25 MG 24 hr tablet  Commonly known as: TOPROL-XL   25 mg, Oral, Every 24 Hours Scheduled             Continue These Medications        Instructions Start Date   albuterol sulfate  (90 Base) MCG/ACT inhaler  Commonly known as: PROVENTIL HFA;VENTOLIN HFA;PROAIR HFA   2 puffs, Inhalation, Every 4 Hours PRN      bimatoprost 0.01 % ophthalmic drops  Commonly known as: LUMIGAN   1 drop, Nightly, Instill 1 in affected eye once a day (at bedtime)       buPROPion  MG 24 hr tablet  Commonly known as: WELLBUTRIN XL   300 mg, Oral, Every Morning, For mood      esomeprazole 40 MG capsule  Commonly known as: nexIUM   40 mg, Oral, Every Morning Before Breakfast      First-Testosterone 2 % ointment   Transdermal      FLUoxetine 40 MG capsule  Commonly known as: PROzac   80 mg, Oral, Daily, For depression and anxiety      Insulin Pen Needle 32G X 4 MM misc   For once daily use with daily injection      LORazepam 0.5 MG tablet  Commonly known as: ATIVAN   No dose, route, or frequency recorded.      metaxalone 800 MG tablet  Commonly known as: SKELAXIN   800 mg, Oral, 2 Times Daily PRN      montelukast 10 MG tablet  Commonly known as: SINGULAIR   10 mg, Oral, Nightly, For allergies      mupirocin 2 % ointment  Commonly known as: Bactroban   Topical, 3 Times Daily, To wound area until healed      NP Thyroid 90 MG tablet  Generic drug: Thyroid       terconazole 0.8 % vaginal cream  Commonly known as: TERAZOL 3   1 applicator, Vaginal, Nightly, X 3 days for yeast      timolol 0.5 % ophthalmic solution  Commonly known as: TIMOPTIC       tobramycin-dexAMETHasone 0.3-0.1 % ophthalmic suspension  Commonly known as: TOBRADEX   No dose, route, or frequency recorded.      valACYclovir 1000 MG tablet  Commonly known  as: VALTREX   TAKE 2 TABLETS BY MOUTH AT ONSET FOR FEVER BLISTER. REPEAT THIS DOSE 1 TIME IN 12 HOURS      Vitamin D3 50 MCG (2000 UT) tablet   Oral, Take 1 capsule by oral route daily for 90 days       Wegovy 0.25 MG/0.5ML solution auto-injector  Generic drug: Semaglutide-Weight Management   0.25 mg, Subcutaneous, Weekly, Inject 0.25 mg SQ weekly x4      Xiidra 5 % ophthalmic solution  Generic drug: Lifitegrast   No dose, route, or frequency recorded.             Stop These Medications      amLODIPine 2.5 MG tablet  Commonly known as: NORVASC     benazepril 10 MG tablet  Commonly known as: LOTENSIN     nitrofurantoin (macrocrystal-monohydrate) 100 MG capsule  Commonly known as: MACROBID              Discharge Diet: cardiac diet    Activity at Discharge: as tolerated    Discharge disposition: home    Follow-up Appointments  Future Appointments   Date Time Provider Department Center   5/7/2024  1:45 PM Dasia Glez, [id number]   5/28/2024  8:20 AM LAB CHAIR 1 [facility]   5/28/2024  8:40 AM Maycol Haynes MD [facility]   6/6/2024 10:45 AM Marjorie Phillips MD [facility]  [facility] None   8/14/2024  8:00 AM Dasia Glez PA-C [id number]     Additional Instructions for the Follow-ups that You Need to Schedule       Ambulatory Referral to Cardiac Rehab   As directed              Test Results Pending at Discharge       DAREK Atkinson, [facility]  04/29/24  14:37 EDT    Time: Discharge 35 min  Electronically signed by DAREK Atkinson, 04/23/24, 9:29 AM EDT.

## 2024-04-23 NOTE — PROGRESS NOTES
REASON FOR CONSULTATION: Easy bruisability  Provide an opinion on any further workup or treatment                             REQUESTING PHYSICIAN: Dasia Glez PA-C    RECORDS OBTAINED:  Records of the patients history including those obtained from the referring provider were reviewed and summarized in detail.    HISTORY OF PRESENT ILLNESS:  The patient is a 63 y.o. year old female who is here for an opinion about the above issue.    History of Present Illness   The patient is a 63-year-old female followed with GE reflux, hypothyroidism, arthritis, hypertension, hyperlipidemia, anxiety and depression.  She had presented to the ER 4/21/2024 with sudden onset substernal chest pain.  She had thought this was her GERD which is often managed with PPI therapy but did not improve with her usual remedies.    She was seen in outside emergency room with EKG is unremarkable, initial troponin elevated 207 and then repeated 411.  She is transferred to [facility] for further evaluation.    Subsequently, A cardiac catheterization was performed that showed no significant atherosclerotic coronary disease.  There is normal contractility of the left ventricular basal segments and apex.  The remaining left ventricular wall segments were akinetic to dyskinetic.  The pattern of the left ventricular wall motion abnormalities was consistent with Takotsubo cardiomyopathy.  Left ventricular systolic function appeared to be severely depressed with an EF of 25 to 30%.An echocardiogram was also obtained that showed moderately decreased LV systolic function with an EF of 33.2% and wall motion abnormalities suspicious for stress-induced cardiomyopathy.  She was started on guideline directed medical therapy with Toprol-XL.  She was previously on benazepril and amlodipine.  Amlodipine was stopped in light of her reduced heart function and benazepril was placed on hold for relatively low blood pressures.     She is able to discharge 4/23/2024 with  groin site without palpable hematoma, off Toprol-XL and held from starting ACE ARB or Entresto given her hypotension.    She had, before this hospitalization, been referred, apparently, for easy bruisability.  This is primarily involves her upper extremities particularly over the last year.  A thorough review of her medications has been obtained as well as her weight loss from treatment with Wegovy not documented at least 40+ pounds over the last year.    Past Medical History:   Diagnosis Date    Anemia, iron deficiency     Arthritis     Depression     Gastroesophageal reflux     Glaucoma     History of complete eye exam [id number]    [facility]: early glaucoma    History of EKG 03/2006    borderline QT prolonged, NSR    History of MRI of cervical spine 03/29/2011    Multilevel DDD with multilevel spinal steonsis, most severe at C5-6 and C6-7, less prominent at C3-4 and C4-5, there is neuroforaminimal compromise on the right at multiple levels    History of MRI of lumbar spine 03/29/2011    Multilevel DDD most prominent at L4-5 and L5-S1, moderate facet DDD, protrusion in L4-5 and mild disc bulge at L5-S1    Hyperlipemia     Hypertension, essential, benign     Low back pain     Nausea 01/23/2015    Reflux esophagitis     Thyroid disorder     Thyroid nodule     Thyroidectomy- 5-6 years ago    Past Surgical History:   Procedure Laterality Date    APPENDECTOMY      BREAST BIOPSY  09/26/2013    left - benign fibroadenoma    CARDIAC CATHETERIZATION N/A 4/21/2024    Procedure: Left Heart Cath;  Surgeon: Marjorie Phillips MD;  Location:  [facility] CATH INVASIVE LOCATION;  Service: Cardiology;  Laterality: N/A;    CARDIAC CATHETERIZATION N/A 4/21/2024    Procedure: Coronary angiography;  Surgeon: Marjorie Phillips MD;  Location:  [facility] CATH INVASIVE LOCATION;  Service: Cardiology;  Laterality: N/A;    CARDIAC CATHETERIZATION N/A 4/21/2024    Procedure: Left ventriculography;  Surgeon: Marjorie Phillips MD;  Location: [facility] CATH  INVASIVE LOCATION;  Service: Cardiology;  Laterality: N/A;    CHOLECYSTECTOMY      COLONOSCOPY      COLPOSCOPY      ENDOMETRIAL ABLATION      ENDOSCOPY  03/2006    Dr. Ruiz: clean based gastric ulcer    ENDOSCOPY N/A 12/13/2023    Procedure: ESOPHAGOGASTRODUODENOSCOPY with cold biopsies;  Surgeon: Silver Rose MD;  Location: [facility] ENDOSCOPY;  Service: Gastroenterology;  Laterality: N/A;  Pre: dysphagia  Post: normal    HYSTERECTOMY  10/2010    THYROIDECTOMY, PARTIAL Right     TRABECULECTOMY Bilateral     UPPER GASTROINTESTINAL ENDOSCOPY  2021        Current Outpatient Medications on File Prior to Visit   Medication Sig Dispense Refill    albuterol sulfate  (90 Base) MCG/ACT inhaler Inhale 2 puffs Every 4 (Four) Hours As Needed.      bimatoprost (LUMIGAN) 0.01 % ophthalmic drops 1 drop Every Night. Instill 1 in affected eye once a day (at bedtime)      buPROPion XL (WELLBUTRIN XL) 300 MG 24 hr tablet Take 1 tablet by mouth Every Morning. For mood 90 tablet 3    Cholecalciferol (VITAMIN D3) 2000 UNITS tablet Take by mouth. Take 1 capsule by oral route daily for 90 days      esomeprazole (nexIUM) 40 MG capsule Take 1 capsule by mouth Every Morning Before Breakfast.      FLUoxetine (PROzac) 40 MG capsule Take 2 capsules by mouth Daily. For depression and anxiety 180 capsule 3    Insulin Pen Needle 32G X 4 MM misc For once daily use with daily injection 100 each 3    LORazepam (ATIVAN) 0.5 MG tablet       metaxalone (SKELAXIN) 800 MG tablet Take 1 tablet by mouth 2 (Two) Times a Day As Needed for Muscle Spasms. 60 tablet 1    metFORMIN ER (GLUCOPHAGE-XR) 500 MG 24 hr tablet 1 p.o. with food for impaired fasting glucose 90 tablet 3    metoprolol succinate XL (TOPROL-XL) 25 MG 24 hr tablet Take 1 tablet by mouth Daily. 90 tablet 3    montelukast (SINGULAIR) 10 MG tablet Take 1 tablet by mouth Every Night. For allergies 90 tablet 3    mupirocin (Bactroban) 2 % ointment Apply  topically to the appropriate  area as directed 3 (Three) Times a Day. To wound area until healed 30 each 1    NP Thyroid 60 MG tablet Take 1 tablet by mouth.      NP Thyroid 90 MG tablet       Rocklatan 0.02-0.005 % solution       Semaglutide-Weight Management (Wegovy) 0.25 MG/0.5ML solution auto-injector Inject 0.25 mg under the skin into the appropriate area as directed 1 (One) Time Per Week. Inject 0.25 mg SQ weekly x4 2 mL 0    terconazole (TERAZOL 3) 0.8 % vaginal cream Insert 1 applicator into the vagina Every Night. X 3 days for yeast 20 g 5    Testosterone Propionate (FIRST-TESTOSTERONE) 2 % ointment Place  on the skin as directed by provider.      timolol (TIMOPTIC) 0.5 % ophthalmic solution       tobramycin-dexamethasone (TOBRADEX) 0.3-0.1 % ophthalmic suspension       valACYclovir (VALTREX) 1000 MG tablet TAKE 2 TABLETS BY MOUTH AT ONSET FOR FEVER BLISTER. REPEAT THIS DOSE 1 TIME IN 12 HOURS 20 tablet 5    XIIDRA 5 % solution        Current Facility-Administered Medications on File Prior to Visit   Medication Dose Route Frequency Provider Last Rate Last Admin    [DISCONTINUED] acetaminophen (TYLENOL) 160 MG/5ML oral solution 650 mg  650 mg Oral Q4H PRN Marjorie Phillips MD   650 mg at 04/20/24 2004    [DISCONTINUED] acetaminophen (TYLENOL) tablet 650 mg  650 mg Oral Q4H PRN Marjorie Phillips MD   650 mg at 04/22/24 0558    [DISCONTINUED] albuterol (PROVENTIL) nebulizer solution 0.083% 2.5 mg/3mL  2.5 mg Nebulization Q6H PRN Marjorie Phillips MD        [DISCONTINUED] atropine sulfate injection 0.5 mg  0.5 mg Intravenous Q5 Min PRN Marjorie Phillips MD        [DISCONTINUED] bisacodyl (DULCOLAX) EC tablet 5 mg  5 mg Oral Daily PRN Marjorie Phillips MD        [DISCONTINUED] bisacodyl (DULCOLAX) suppository 10 mg  10 mg Rectal Daily PRN Marjorie Phillips MD        [DISCONTINUED] buPROPion XL (WELLBUTRIN XL) 24 hr tablet 300 mg  300 mg Oral QAM Marjorie Phillips MD   300 mg at 04/22/24 0559    [DISCONTINUED] calcium carbonate (TUMS) chewable  tablet 500 mg (200 mg elemental)  2 tablet Oral TID PRN Owen Arango PA-C   2 tablet at 04/21/24 2110    [DISCONTINUED] FLUoxetine (PROzac) capsule 80 mg  80 mg Oral Daily Marjorie Phillips MD   80 mg at 04/22/24 0854    [DISCONTINUED] latanoprost (XALATAN) 0.005 % ophthalmic solution 1 drop  1 drop Both Eyes Nightly Marjorie Phillips MD        [DISCONTINUED] LORazepam (ATIVAN) tablet 0.5 mg  0.5 mg Oral Q6H PRN Marjorie Phillips MD        [DISCONTINUED] metoprolol succinate XL (TOPROL-XL) 24 hr tablet 25 mg  25 mg Oral Q24H Marjorie Phillips MD   25 mg at 04/22/24 0854    [DISCONTINUED] montelukast (SINGULAIR) tablet 10 mg  10 mg Oral Nightly Marjorie Phillips MD   10 mg at 04/21/24 2110    [DISCONTINUED] morphine injection 1 mg  1 mg Intravenous Q4H PRN Marjorie Phillips MD        [DISCONTINUED] naloxone (NARCAN) injection 0.4 mg  0.4 mg Intravenous Q5 Min PRN Marjorie Phillips MD        [DISCONTINUED] nitroglycerin (NITROSTAT) SL tablet 0.4 mg  0.4 mg Sublingual Q5 Min PRN Marjorie Phillips MD        [DISCONTINUED] ondansetron (ZOFRAN) injection 4 mg  4 mg Intravenous Q6H PRN Marjorie Phillips MD        [DISCONTINUED] ondansetron ODT (ZOFRAN-ODT) disintegrating tablet 4 mg  4 mg Oral Q6H PRN Marjorie Phillips MD        [DISCONTINUED] pantoprazole (PROTONIX) EC tablet 40 mg  40 mg Oral Q AM Owen Arango PA-C   40 mg at 04/22/24 0558    [DISCONTINUED] polyethylene glycol (MIRALAX) packet 17 g  17 g Oral Daily PRN Marjorie Phillips MD        [DISCONTINUED] sennosides-docusate (PERICOLACE) 8.6-50 MG per tablet 2 tablet  2 tablet Oral BID PRN Marjorie Phillips MD        [DISCONTINUED] sodium chloride 0.9 % flush 10 mL  10 mL Intravenous Q12H Marjorie Phillips MD   10 mL at 04/22/24 0855    [DISCONTINUED] sodium chloride 0.9 % flush 10 mL  10 mL Intravenous PRN Marjorie Phillips MD        [DISCONTINUED] sodium chloride 0.9 % infusion 250 mL  250 mL Intravenous Once PRN Marjorie Phillips MD        [DISCONTINUED]  sodium chloride 0.9 % infusion 40 mL  40 mL Intravenous PRN Marjorie Phillips MD        [DISCONTINUED] Thyroid (ARMOUR) tablet 90 mg  90 mg Oral QAM AC Marjorie Phillips MD   90 mg at 24 0559    [DISCONTINUED] timolol (TIMOPTIC) 0.5 % ophthalmic solution 1 drop  1 drop Both Eyes Daily Marjorie Phillips MD   1 drop at 24 0855    [DISCONTINUED] tobramycin-dexAMETHasone (TOBRADEX) 0.3-0.1 % ophthalmic suspension 1 drop  1 drop Both Eyes Once Marjorie Phillips MD            ALLERGIES:    Allergies   Allergen Reactions    Carrot [Daucus Carota] Anaphylaxis    Banana Itching     Throat itching      Augmentin [Amoxicillin-Pot Clavulanate] Diarrhea and GI Intolerance        Social History     Socioeconomic History    Marital status:    Tobacco Use    Smoking status: Former     Current packs/day: 0.00     Types: Cigarettes     Quit date:      Years since quittin.3    Smokeless tobacco: Never    Tobacco comments:     Started at age 19/Stopped at age 45   Vaping Use    Vaping status: Never Used   Substance and Sexual Activity    Alcohol use: Yes     Comment: rare    Drug use: No    Sexual activity: Defer        Family History   Problem Relation Age of Onset    Breast cancer Mother     Kidney cancer Mother     Hypertension Father     Diabetes Brother         Review of Systems   Constitutional:  Positive for unexpected weight change (42 lbs-7 mos, Wegovy).   HENT:  Positive for hearing loss (Left), sinus pressure, sinus pain and trouble swallowing (?).    Eyes:         Glaucoma   Respiratory:  Positive for choking and shortness of breath.    Cardiovascular:  Positive for chest pain.   Gastrointestinal:         Reflux, H/H   Musculoskeletal:  Positive for back pain and neck pain.   Allergic/Immunologic: Positive for environmental allergies.   Hematological:  Bruises/bleeds easily.   Psychiatric/Behavioral:  Positive for decreased concentration.         Objective     Vitals:    24 1523   BP: 91/64  "  Pulse: 85   Resp: 16   Temp: 97.8 °F (36.6 °C)   TempSrc: Temporal   SpO2: 97%   Weight: 62.6 kg (138 lb)   Height: 160 cm (62.99\")   PainSc: 0-No pain         4/23/2024     3:12 PM   Current Status   ECOG score 0       Physical Exam  Constitutional:       Appearance: Normal appearance.   HENT:      Head: Normocephalic and atraumatic.      Nose: Nose normal.      Mouth/Throat:      Mouth: Mucous membranes are moist.      Pharynx: Oropharynx is clear.   Eyes:      Extraocular Movements: Extraocular movements intact.      Conjunctiva/sclera: Conjunctivae normal.      Pupils: Pupils are equal, round, and reactive to light.   Cardiovascular:      Rate and Rhythm: Normal rate and regular rhythm.      Pulses: Normal pulses.      Heart sounds: Normal heart sounds.   Pulmonary:      Effort: Pulmonary effort is normal.      Breath sounds: Normal breath sounds.   Abdominal:      General: Bowel sounds are normal.      Palpations: Abdomen is soft.   Musculoskeletal:         General: Swelling present.      Cervical back: Normal range of motion and neck supple.   Skin:     General: Skin is warm and dry.      Findings: Bruising (Mild ecchymoses upper extremities bilaterally) present.   Neurological:      General: No focal deficit present.      Mental Status: She is oriented to person, place, and time.   Psychiatric:         Mood and Affect: Mood normal.         Behavior: Behavior normal.           RECENT LABS:  Hematology WBC   Date Value Ref Range Status   04/20/2024 12.31 (H) 3.40 - 10.80 10*3/mm3 Final   02/14/2024 9.4 3.4 - 10.8 x10E3/uL Final     RBC   Date Value Ref Range Status   04/20/2024 4.44 3.77 - 5.28 10*6/mm3 Final   02/14/2024 4.12 3.77 - 5.28 x10E6/uL Final     Hemoglobin   Date Value Ref Range Status   04/20/2024 12.8 12.0 - 15.9 g/dL Final     Hematocrit   Date Value Ref Range Status   04/20/2024 40.8 34.0 - 46.6 % Final     Platelets   Date Value Ref Range Status   04/20/2024 560 (H) 140 - 450 10*3/mm3 Final "          Assessment & Plan      Patient is a 63-year-old female we are asked to see for easy bruising.  She had just been hospitalized 4/20-21/24 having a medical history inclusive of anxiety, depression, reflux, hypothyroidism, arthritis, hyperlipidemia, hypertension who presented to ER with complaints of chest pain.  This happened while she was folding laundry when she had sudden onset of chest pain and left arm numbness.  She will have similar symptoms usually improve with PPI and Tums but this became far more severe.  She presented to an outside emergency room with unremarkable EKG, white count 12,300, troponin of 207, repeat of 411.  She is to have nitroglycerin, treated with enoxaparin and transferred for further assessment.     cardiac catheterization was performed that showed no significant atherosclerotic coronary disease.  There is normal contractility of the left ventricular basal segments and apex.  The remaining left ventricular wall segments were akinetic to dyskinetic.  The pattern of the left ventricular wall motion abnormalities was consistent with Takotsubo cardiomyopathy.  Left ventricular systolic function appeared to be severely depressed with an EF of 25 to 30%.An echocardiogram was also obtained that showed moderately decreased LV systolic function with an EF of 33.2% and wall motion abnormalities suspicious for stress-induced cardiomyopathy.      Additional studies include a normal PT, PTT and activated clotting time.  Additional studies demonstrated a modest degree of thrombocytosis documented 5 years ago and variable through her recent hospitalization.    She was able to discharge 4/23/2024 with groin site without palpable hematoma, off Toprol-XL and held from starting ACE ARB or Entresto given her hypotension.    She had, before this hospitalization, been referred, apparently, for easy bruisability.  This is primarily involves her upper extremities particularly over the last year.  A  thorough review of her medications has been obtained as well as her weight loss from treatment with Wegovy not documented at least 40+ pounds over the last year.    We have discussed that her findings are limited to her upper extremities and would suggest weight loss and loss of subcutaneous fat in her arms with concern that her current additional medications particularly high-dose Prozac.  In discussing this with her she states that she clearly feels better on this medication and does not wish to discontinue it.  Her thrombocytosis also needs to be investigated to some degree to determine whether it could be associated with qualitative platelet defect as well.    Plan:  *Patient to return to laboratory today for ferritin, iron profile, fibrinogen, platelet function testing, and von Willebrand's panel    *MD follow-up 3 to 5 weeks

## 2024-04-23 NOTE — TELEPHONE ENCOUNTER
Caller: Arlene Quesada    Relationship to patient: Self    Best call back number: [phone]    Type of visit: POST OP    Requested date: 1 WEEK AND 1 MONTH    Additional notes: PT IS CALLING TO SCHEDULE A FOLLOW UP AFTER HAVING PROCEDURE ON 4/20/24. SHE SAID SHE WAS SUPPOSED TO FOLLOW UP WITH TEZ BOYD IN 1 WEEK AND DR. HERNANDEZ IN 6 WEEKS, PLEASE ADVISE,

## 2024-04-23 NOTE — OUTREACH NOTE
Call Center TCM Note      Flowsheet Row Responses   [facility] patient discharged from? [place]   Does the patient have one of the following disease processes/diagnoses(primary or secondary)? Acute MI (STEMI,NSTEMI)   TCM attempt successful? Yes   Call start time 1422   Call end time 1425   Discharge diagnosis chest pain, NSTEMI, heart cath   Meds reviewed with patient/caregiver? Yes   Is the patient having any side effects they believe may be caused by any medication additions or changes? No   Does the patient have all prescriptions related to this admission filled (includes statins,anticoagulants,HTN meds,anti-arrhythmia meds) Yes   Prescription comments New rx Metoprolol succinate XL in place, Amlodipine, Macrobid, Benazepril discontinued.   Is the patient taking all medications as directed (includes completed medication regime)? Yes   Comments Pt is scheduled with PCP WOO Glez for HOSP FOLLOW UP on 05/07/2024. OF NOTE TO PCP OFC: This date is past TCM APPT applicable time frame, appt would need to be completed by 05/06/2024 for TCM .   Does the patient have an appointment with their PCP within 7-14 days of discharge? No   Nursing Interventions Routed TCM call to PCP office   Has home health visited the patient within 72 hours of discharge? N/A   Psychosocial issues? No   Did the patient receive a copy of their discharge instructions? Yes   Nursing interventions Reviewed instructions with patient   What is the patient's perception of their health status since discharge? Improving   Nursing interventions Nurse provided patient education   Is the patient/caregiver able to teach back signs and symptoms of when to call for help immediately: Sudden chest discomfort, Nausea or vomiting, Dizziness or lightheadedness, Sudden discomfort in arms, back, neck or jaw, Shortness of breath at any time, Irregular or rapid heart rate, Sudden sweating or clammy skin   Nursing interventions Nurse provided patient  education   Is the patient/caregiver able to teach back lifestyle changes to help prevent MIs Managing diabetes, Reducing stress, Regular exercise as approved by provider, Heart healthy diet, Limiting alcohol intake, Maintaining a healthy weight   Is the patient/caregiver able to teach back ways to prevent a second heart attack: Take medications, Manage risk factors, Get support (AHA website:supportnetwork.heart.org), Follow up with MD   If the patient is a current smoker, are they able to teach back resources for cessation? Not a smoker   Is the patient/caregiver able to teach back the hierarchy of who to call/visit for symptoms/problems? PCP, Specialist, Home health nurse, Urgent Care, ED, 911 Yes   TCM call completed? Yes   Wrap up additional comments D/C DX: chest pain, NSTEMI, heart cath - Pt pretty tired but feeling better. No questions at this time. CARDIO follow up appts are pending. Pt is scheduled with PCP WOO Glez for HOSP FOLLOW UP on 05/07/2024. OF NOTE TO PCP OFC: This date is past TCM APPT applicable time frame, appt would need to  be completed by 05/06/2024 for TCM .   Call end time [id number]            Kate Brown MA    4/23/2024, 14:29 EDT

## 2024-04-24 ENCOUNTER — PATIENT ROUNDING (BHMG ONLY) (OUTPATIENT)
Dept: ONCOLOGY | Facility: CLINIC | Age: 64
End: 2024-04-24
Payer: COMMERCIAL

## 2024-04-25 ENCOUNTER — TELEPHONE (OUTPATIENT)
Dept: CARDIAC REHAB | Facility: HOSPITAL | Age: 64
End: 2024-04-25
Payer: COMMERCIAL

## 2024-04-25 LAB
FACT VIII ACT/NOR PPP: 262 % (ref 56–140)
PATH INTERP BLD-IMP: NORMAL
VWF AG ACT/NOR PPP IA: 265 % (ref 50–200)
VWF:RCO ACT/NOR PPP PL AGG: 171 % (ref 50–200)

## 2024-04-25 NOTE — TELEPHONE ENCOUNTER
I just spoke with your patient.  She had lots of questions about her diagnosis, expected recovery time, and medications.  We had a nice discussion.  She really wants to attend cardiac rehab, but thinks it would be best if she can participate in a program close to her work place.  We briefly discuss [facility] location and I mailed her their contact information along with an educational handout about Takotsubo.     Thank you for the referral!

## 2024-04-26 NOTE — PAYOR COMM NOTE
"Eileen Avery (64 y.o. Female)                                  ATTENTION; DISCHARGE CASE REF #[id number]                             REPLY TO [facility] DEPT  [phone]            Date of Birth   1960    Social Security Number       Address   [address] [place], [place] [address]    Home Phone   [phone]    MRN   [id number]       [employer]   [employer]olic    Marital Status                               Admission Date   4/20/24    Admission Type   Urgent    Admitting Provider   Sania Gallo MD    Attending Provider       Department, Room/Bed   [facility] CARDIOVASC UNIT, 2201/1       Discharge Date   4/22/2024    Discharge Disposition   Home or Self Care    Discharge Destination                                 Attending Provider: (none)   Allergies: Carrot [Daucus Carota], Banana, Augmentin [Amoxicillin-pot Clavulanate]    Isolation: None   Infection: None   Code Status: Prior    Ht: 160 cm (63\")   Wt: 60.8 kg (134 lb)    Admission Cmt: None   Principal Problem: Chest pain [R07.9]                   Active Insurance as of 4/20/2024       Primary Coverage       Payor Plan Insurance Group Employer/Plan Group    ANTHEM BLUE CROSS ANTHEM BLUE CROSS BLUE SHIELD PPO [id number]       Payor Plan Address Payor Plan Phone Number Payor Plan Fax Number Effective Dates    [phone]  6/1/2022 - None Entered    [place] [address]         Subscriber Name Subscriber Birth Date Member ID       EILEEN AVERY 1960 [id number]                     Emergency Contacts        (Rel.) Home Phone Work Phone Mobile Phone    Damien Avery (Spouse) -- -- [phone]    MANUEL OLIVARES (Friend) [phone] -- [phone]                 Discharge Summary        Carmen Shipley APRN at 04/22/24 1408       Attestation signed by Marjorie Phillips MD at 04/23/24 1449    I have reviewed this documentation and agree.                  Patient Name: Eileen SMITH " Dipak  :1960  63 y.o.    Date of Admit: 2024  Date of Discharge:  2024    Discharge Diagnosis:  Problems Addressed this Visit       NSTEMI, initial episode of care    Relevant Medications    metoprolol succinate XL (TOPROL-XL) 25 MG 24 hr tablet    Other Relevant Orders    Case Request Cath Lab: Left Heart Cath, Coronary angiography (Completed)    Cardiac Catheterization/Vascular Study (Completed)    Ambulatory Referral to Cardiac Rehab     Other Visit Diagnoses       Chest pain at rest    -  Primary          Diagnoses         Codes Comments    Chest pain at rest    -  Primary ICD-10-CM: R07.9  ICD-9-CM: 786.50     NSTEMI, initial episode of care     ICD-10-CM: I21.4  ICD-9-CM: 410.71             Hospital Course:   This is a 63-year-old female with a past medical history of anxiety, depression, GERD, hypothyroidism, arthritis, hypertension, hyperlipidemia.  She presented to the emergency room on 2024 with complaints of sudden onset substernal chest discomfort that radiated to the left side of her chest and was associated with left sided arm numbness and shortness of breath.  She reported that the discomfort was similar to what she typically experiences with GERD.  Usually she will take a PPI in times which will relieve her discomfort.  However with this particular episode the discomfort was much more severe and radiated to the left chest.  She tried taking Tums without any relief.  She also reported that she was belching a lot and had left arm numbness and shortness of breath.    In the emergency room at [facility] her EKG was unremarkable and her vital signs were within normal limits.  Her initial troponin was elevated at 207 with a repeat of 411.  She was transferred to [facility] for further evaluation.    A cardiac catheterization was performed that showed no significant atherosclerotic coronary disease.  There is normal contractility of the left ventricular basal segments  and apex.  The remaining left ventricular wall segments were akinetic to dyskinetic.  The pattern of the left ventricular wall motion abnormalities was consistent with Takotsubo cardiomyopathy.  Left ventricular systolic function appeared to be severely depressed with an EF of 25 to 30%.An echocardiogram was also obtained that showed moderately decreased LV systolic function with an EF of 33.2% and wall motion abnormalities suspicious for stress-induced cardiomyopathy.  She was started on guideline directed medical therapy with Toprol-XL.  She was previously on benazepril and amlodipine.  Amlodipine was stopped in light of her reduced heart function and benazepril was placed on hold for relatively low blood pressures.    She is stable for discharge home today.  She denies any chest discomforts or shortness of breath.  She has ambulated without any difficulties.  Her right groin site is soft with no palpable hematoma.  She will remain on Toprol-XL but we will hold off on initiating an ACE ARB or Entresto for the time being given her lower blood pressure readings.  This will be addressed at her follow-up with me in 1 week.  We will plan to repeat an echocardiogram in about 2 to 3 months to reassess her cardiomyopathy.  He does plan to attend cardiac rehab as well.        Procedures Performed  Procedure(s):  Left Heart Cath  Coronary angiography  Left ventriculography       Consults       No orders found from 3/22/2024 to 4/21/2024.            Pertinent Test Results:   Results from last 7 days   Lab Units 04/21/24  1243 04/20/24  1653 04/20/24  1545   SODIUM mmol/L 139  --  138   POTASSIUM mmol/L 4.5  --  4.1   CHLORIDE mmol/L 104  --  101   CO2 mmol/L 24.0  --  25.0   BUN mg/dL 10  --  11   CREATININE mg/dL 0.66   < > 0.75   CALCIUM mg/dL 8.7  --  9.5   BILIRUBIN mg/dL  --   --  0.2   ALK PHOS U/L  --   --  110   ALT (SGPT) U/L  --   --  40*   AST (SGOT) U/L  --   --  25   GLUCOSE mg/dL 102*  --  93    < > = values in  this interval not displayed.     Results from last 7 days   Lab Units 04/21/24  1243 04/20/24  1741 04/20/24  1545   HSTROP T ng/L 144* 411* 207*     @LABRCNT(bnp)@  Results from last 7 days   Lab Units 04/20/24  1545   WBC 10*3/mm3 12.31*   HEMOGLOBIN g/dL 12.8   HEMATOCRIT % 40.8   PLATELETS 10*3/mm3 560*                   Condition on Discharge: stable    Discharge Medications     Discharge Medications        New Medications        Instructions Start Date   metoprolol succinate XL 25 MG 24 hr tablet  Commonly known as: TOPROL-XL   25 mg, Oral, Every 24 Hours Scheduled             Continue These Medications        Instructions Start Date   albuterol sulfate  (90 Base) MCG/ACT inhaler  Commonly known as: PROVENTIL HFA;VENTOLIN HFA;PROAIR HFA   2 puffs, Inhalation, Every 4 Hours PRN      bimatoprost 0.01 % ophthalmic drops  Commonly known as: LUMIGAN   1 drop, Nightly, Instill 1 in affected eye once a day (at bedtime)       buPROPion  MG 24 hr tablet  Commonly known as: WELLBUTRIN XL   300 mg, Oral, Every Morning, For mood      esomeprazole 40 MG capsule  Commonly known as: nexIUM   40 mg, Oral, Every Morning Before Breakfast      First-Testosterone 2 % ointment   Transdermal      FLUoxetine 40 MG capsule  Commonly known as: PROzac   80 mg, Oral, Daily, For depression and anxiety      Insulin Pen Needle 32G X 4 MM misc   For once daily use with daily injection      LORazepam 0.5 MG tablet  Commonly known as: ATIVAN   No dose, route, or frequency recorded.      metaxalone 800 MG tablet  Commonly known as: SKELAXIN   800 mg, Oral, 2 Times Daily PRN      metFORMIN  MG 24 hr tablet  Commonly known as: GLUCOPHAGE-XR   1 p.o. with food for impaired fasting glucose      montelukast 10 MG tablet  Commonly known as: SINGULAIR   10 mg, Oral, Nightly, For allergies      mupirocin 2 % ointment  Commonly known as: Bactroban   Topical, 3 Times Daily, To wound area until healed      NP Thyroid 90 MG  tablet  Generic drug: Thyroid       terconazole 0.8 % vaginal cream  Commonly known as: TERAZOL 3   1 applicator, Vaginal, Nightly, X 3 days for yeast      timolol 0.5 % ophthalmic solution  Commonly known as: TIMOPTIC       tobramycin-dexAMETHasone 0.3-0.1 % ophthalmic suspension  Commonly known as: TOBRADEX   No dose, route, or frequency recorded.      valACYclovir 1000 MG tablet  Commonly known as: VALTREX   TAKE 2 TABLETS BY MOUTH AT ONSET FOR FEVER BLISTER. REPEAT THIS DOSE 1 TIME IN 12 HOURS      Vitamin D3 50 MCG (2000 UT) tablet   Oral, Take 1 capsule by oral route daily for 90 days       Wegovy 0.25 MG/0.5ML solution auto-injector  Generic drug: Semaglutide-Weight Management   0.25 mg, Subcutaneous, Weekly, Inject 0.25 mg SQ weekly x4      Xiidra 5 % ophthalmic solution  Generic drug: Lifitegrast   No dose, route, or frequency recorded.             Stop These Medications      amLODIPine 2.5 MG tablet  Commonly known as: NORVASC     benazepril 10 MG tablet  Commonly known as: LOTENSIN     nitrofurantoin (macrocrystal-monohydrate) 100 MG capsule  Commonly known as: MACROBID              Discharge Diet: cardiac diet    Activity at Discharge: as tolerated    Discharge disposition: home    Follow-up Appointments  Future Appointments   Date Time Provider Department Center   4/23/2024  2:30 PM LAB CHAIR 1 [facility]   4/23/2024  3:00 PM Maycol Haynes MD [facility]   5/7/2024  1:45 PM Dasia Glez PA-C [facility]   8/14/2024  8:00 AM Dasia Glez PA-C [facility]     Additional Instructions for the Follow-ups that You Need to Schedule       Ambulatory Referral to Cardiac Rehab   As directed              Test Results Pending at Discharge       DAREK Atkinson, [facility]  04/23/24  09:29 EDT    Time: Discharge 35 min  Electronically signed by DAREK Atkinson, 04/23/24, 9:29 AM EDT.      Electronically signed by Marjorie Phillips MD  at 04/23/24 4046

## 2024-04-28 RX ORDER — METFORMIN HYDROCHLORIDE 500 MG/1
TABLET, EXTENDED RELEASE ORAL
Qty: 90 TABLET | Refills: 0 | Status: SHIPPED | OUTPATIENT
Start: 2024-04-28

## 2024-04-29 ENCOUNTER — OFFICE VISIT (OUTPATIENT)
Dept: CARDIOLOGY | Facility: CLINIC | Age: 64
End: 2024-04-29
Payer: COMMERCIAL

## 2024-04-29 VITALS
HEIGHT: 63 IN | OXYGEN SATURATION: 93 % | BODY MASS INDEX: 24.8 KG/M2 | DIASTOLIC BLOOD PRESSURE: 64 MMHG | HEART RATE: 77 BPM | SYSTOLIC BLOOD PRESSURE: 100 MMHG | WEIGHT: 140 LBS

## 2024-04-29 DIAGNOSIS — R94.31 ABNORMAL ECG: Primary | ICD-10-CM

## 2024-04-29 DIAGNOSIS — E78.2 MIXED HYPERLIPIDEMIA: ICD-10-CM

## 2024-04-29 DIAGNOSIS — I10 PRIMARY HYPERTENSION: ICD-10-CM

## 2024-04-29 PROBLEM — I42.8 NICM (NONISCHEMIC CARDIOMYOPATHY): Status: ACTIVE | Noted: 2024-04-29

## 2024-04-29 PROCEDURE — 93000 ELECTROCARDIOGRAM COMPLETE: CPT | Performed by: NURSE PRACTITIONER

## 2024-04-29 PROCEDURE — 99214 OFFICE O/P EST MOD 30 MIN: CPT | Performed by: NURSE PRACTITIONER

## 2024-04-29 NOTE — PROGRESS NOTES
Subjective:     Encounter Date:04/29/2024      Patient ID: Arlene Quesada is a 64 y.o. female.    Chief Complaint:  History of Present Illness  This is a 63-year-old female with a past medical history of anxiety, depression, GERD, hypothyroidism, arthritis, hypertension, hyperlipidemia.  She presented to the emergency room on 4/21/2024 with complaints of sudden onset substernal chest discomfort that radiated to the left side of her chest and was associated with left sided arm numbness and shortness of breath.  She reported that the discomfort was similar to what she typically experiences with GERD.  Usually she will take a PPI in times which will relieve her discomfort.  However with this particular episode the discomfort was much more severe and radiated to the left chest.  She tried taking Tums without any relief.  She also reported that she was belching a lot and had left arm numbness and shortness of breath.     In the emergency room at [facility] her EKG was unremarkable and her vital signs were within normal limits.  Her initial troponin was elevated at 207 with a repeat of 411.  She was transferred to [facility] for further evaluation.     A cardiac catheterization was performed that showed no significant atherosclerotic coronary disease.  There is normal contractility of the left ventricular basal segments and apex.  The remaining left ventricular wall segments were akinetic to dyskinetic.  The pattern of the left ventricular wall motion abnormalities was consistent with Takotsubo cardiomyopathy.  Left ventricular systolic function appeared to be severely depressed with an EF of 25 to 30%.An echocardiogram was also obtained that showed moderately decreased LV systolic function with an EF of 33.2% and wall motion abnormalities suspicious for stress-induced cardiomyopathy.  She was started on guideline directed medical therapy with Toprol-XL.  She was previously on benazepril and  amlodipine.  Amlodipine was stopped in light of her reduced heart function and benazepril was placed on hold for relatively low blood pressures.    He is here today for follow-up visit.  She has been doing well since she has been home.  She denies any further episodes of chest pain.  She denies any shortness of breath, palpitations or syncope.  She has had 1 episode of a little dizziness but that is all.  She denies any swelling in her lower extremities, orthopnea or PND.  She has not been monitoring her blood pressure at home.  Today it is 100/64.  She has been feeling fine with her blood pressure being on the lower side.  She plans to attend cardiac rehab however her insurance has denied denied coverage of this.       I have reviewed and updated as appropriate allergies, current medications, past family history, past medical history, past surgical history and problem list.    Review of Systems   Constitutional: Negative for fever, malaise/fatigue, weight gain and weight loss.   HENT:  Negative for congestion, hoarse voice and sore throat.    Eyes:  Negative for blurred vision and double vision.   Cardiovascular:  Negative for chest pain, dyspnea on exertion, leg swelling, orthopnea, palpitations and syncope.   Respiratory:  Negative for cough, shortness of breath and wheezing.    Gastrointestinal:  Negative for abdominal pain, hematemesis, hematochezia and melena.   Genitourinary:  Negative for dysuria and hematuria.   Neurological:  Negative for dizziness, headaches, light-headedness and numbness.   Psychiatric/Behavioral:  Negative for depression. The patient is not nervous/anxious.          Current Outpatient Medications:     albuterol sulfate  (90 Base) MCG/ACT inhaler, Inhale 2 puffs Every 4 (Four) Hours As Needed., Disp: , Rfl:     bimatoprost (LUMIGAN) 0.01 % ophthalmic drops, 1 drop Every Night. Instill 1 in affected eye once a day (at bedtime), Disp: , Rfl:     buPROPion XL (WELLBUTRIN XL) 300 MG  24 hr tablet, Take 1 tablet by mouth Every Morning. For mood, Disp: 90 tablet, Rfl: 3    Cholecalciferol (VITAMIN D3) 2000 UNITS tablet, Take by mouth. Take 1 capsule by oral route daily for 90 days, Disp: , Rfl:     esomeprazole (nexIUM) 40 MG capsule, Take 1 capsule by mouth Every Morning Before Breakfast., Disp: , Rfl:     FLUoxetine (PROzac) 40 MG capsule, Take 2 capsules by mouth Daily. For depression and anxiety, Disp: 180 capsule, Rfl: 3    Insulin Pen Needle 32G X 4 MM misc, For once daily use with daily injection, Disp: 100 each, Rfl: 3    LORazepam (ATIVAN) 0.5 MG tablet, , Disp: , Rfl:     metaxalone (SKELAXIN) 800 MG tablet, Take 1 tablet by mouth 2 (Two) Times a Day As Needed for Muscle Spasms., Disp: 60 tablet, Rfl: 1    metFORMIN ER (GLUCOPHAGE-XR) 500 MG 24 hr tablet, TAKE 1 TABLET DAILY, Disp: 90 tablet, Rfl: 0    metoprolol succinate XL (TOPROL-XL) 25 MG 24 hr tablet, Take 1 tablet by mouth Daily., Disp: 90 tablet, Rfl: 3    montelukast (SINGULAIR) 10 MG tablet, Take 1 tablet by mouth Every Night. For allergies, Disp: 90 tablet, Rfl: 3    mupirocin (Bactroban) 2 % ointment, Apply  topically to the appropriate area as directed 3 (Three) Times a Day. To wound area until healed, Disp: 30 each, Rfl: 1    NP Thyroid 60 MG tablet, Take 1 tablet by mouth., Disp: , Rfl:     NP Thyroid 90 MG tablet, , Disp: , Rfl:     Rocklatan 0.02-0.005 % solution, , Disp: , Rfl:     Semaglutide-Weight Management (Wegovy) 0.25 MG/0.5ML solution auto-injector, Inject 0.25 mg under the skin into the appropriate area as directed 1 (One) Time Per Week. Inject 0.25 mg SQ weekly x4, Disp: 2 mL, Rfl: 0    terconazole (TERAZOL 3) 0.8 % vaginal cream, Insert 1 applicator into the vagina Every Night. X 3 days for yeast, Disp: 20 g, Rfl: 5    Testosterone Propionate (FIRST-TESTOSTERONE) 2 % ointment, Place  on the skin as directed by provider., Disp: , Rfl:     timolol (TIMOPTIC) 0.5 % ophthalmic solution, , Disp: , Rfl:      tobramycin-dexamethasone (TOBRADEX) 0.3-0.1 % ophthalmic suspension, , Disp: , Rfl:     valACYclovir (VALTREX) 1000 MG tablet, TAKE 2 TABLETS BY MOUTH AT ONSET FOR FEVER BLISTER. REPEAT THIS DOSE 1 TIME IN 12 HOURS, Disp: 20 tablet, Rfl: 5    XIIDRA 5 % solution, , Disp: , Rfl:     Past Medical History:   Diagnosis Date    Anemia, iron deficiency     Arthritis     Depression     Gastroesophageal reflux     Glaucoma     History of complete eye exam 90718    [facility]: early glaucoma    History of EKG 03/2006    borderline QT prolonged, NSR    History of MRI of cervical spine 03/29/2011    Multilevel DDD with multilevel spinal steonsis, most severe at C5-6 and C6-7, less prominent at C3-4 and C4-5, there is neuroforaminimal compromise on the right at multiple levels    History of MRI of lumbar spine 03/29/2011    Multilevel DDD most prominent at L4-5 and L5-S1, moderate facet DDD, protrusion in L4-5 and mild disc bulge at L5-S1    Hyperlipemia     Hypertension, essential, benign     Low back pain     Nausea 01/23/2015    Reflux esophagitis     Thyroid disorder     Thyroid nodule        Past Surgical History:   Procedure Laterality Date    APPENDECTOMY      BREAST BIOPSY  09/26/2013    left - benign fibroadenoma    CARDIAC CATHETERIZATION N/A 4/21/2024    Procedure: Left Heart Cath;  Surgeon: Marjorie Phillips MD;  Location:  [facility];  Service: Cardiology;  Laterality: N/A;    CARDIAC CATHETERIZATION N/A 4/21/2024    Procedure: Coronary angiography;  Surgeon: Marjorie Phillips MD;  Location:  [facility];  Service: Cardiology;  Laterality: N/A;    CARDIAC CATHETERIZATION N/A 4/21/2024    Procedure: Left ventriculography;  Surgeon: Marjorie Phillips MD;  Location:  [facility];  Service: Cardiology;  Laterality: N/A;    CHOLECYSTECTOMY      COLONOSCOPY      COLPOSCOPY      ENDOMETRIAL ABLATION      ENDOSCOPY  03/2006    Dr. Ruiz: clean based gastric ulcer     "ENDOSCOPY N/A 2023    Procedure: ESOPHAGOGASTRODUODENOSCOPY with cold biopsies;  Surgeon: Silver Rose MD;  Location: [facility] END[facility];  Service: Gastroenterology;  Laterality: N/A;  Pre: dysphagia  Post: normal    HYSTERECTOMY  10/2010    THYROIDECTOMY, PARTIAL Right     TRABECULECTOMY Bilateral     UPPER GASTROINTESTINAL ENDOSCOPY         Family History   Problem Relation Age of Onset    Breast cancer Mother     Kidney cancer Mother     Hypertension Father     Diabetes Brother        Social History     Tobacco Use    Smoking status: Former     Current packs/day: 0.00     Types: Cigarettes     Quit date:      Years since quittin.3    Smokeless tobacco: Never    Tobacco comments:     Started at age 19/Stopped at age 45   Vaping Use    Vaping status: Never Used   Substance Use Topics    Alcohol use: Yes     Comment: rare    Drug use: No         ECG 12 Lead    Date/Time: 2024 1:20 PM  Performed by: Carmen Shipley APRN    Authorized by: Carmen Shipley APRN  Comparison: compared with previous ECG from 2024  Rhythm: sinus rhythm  Other findings: non-specific ST-T wave changes           Objective:     Visit Vitals  /64 (BP Location: Left arm, Patient Position: Sitting, Cuff Size: Adult)   Pulse 77   Ht 160 cm (62.99\")   Wt 63.5 kg (140 lb)   LMP  (LMP Unknown)   SpO2 93%   BMI 24.81 kg/m²             Physical Exam  Constitutional:       Appearance: Normal appearance. She is normal weight.   HENT:      Head: Normocephalic.   Neck:      Vascular: No carotid bruit.   Cardiovascular:      Rate and Rhythm: Normal rate and regular rhythm.      Chest Wall: PMI is not displaced.      Pulses: Normal pulses.           Radial pulses are 2+ on the right side and 2+ on the left side.        Posterior tibial pulses are 2+ on the right side and 2+ on the left side.      Heart sounds: No murmur heard.     No friction rub. No gallop.   Pulmonary:      Effort: Pulmonary effort is normal.      " Breath sounds: Normal breath sounds.   Abdominal:      General: Bowel sounds are normal. There is no distension.      Palpations: Abdomen is soft.   Musculoskeletal:      Right lower leg: No edema.      Left lower leg: No edema.   Skin:     General: Skin is warm and dry.      Capillary Refill: Capillary refill takes less than 2 seconds.   Neurological:      Mental Status: She is alert and oriented to person, place, and time.   Psychiatric:         Mood and Affect: Mood normal.         Behavior: Behavior normal.         Thought Content: Thought content normal.        Lab Review:   Lipid Panel          2/14/2024    00:00   Lipid Panel   Total Cholesterol 183    Triglycerides 178    HDL Cholesterol 46    VLDL Cholesterol 31    LDL Cholesterol  106          Cardiac Procedures:       Assessment:         Diagnoses and all orders for this visit:    1. Abnormal ECG (Primary)    2. Primary hypertension    3. Mixed hyperlipidemia    Other orders  -     ECG 12 Lead            Plan:       NICM: recent NSTEMI, cardiac cath showed normal coronaries, left ventricular wall motion abnormalities consistent with Takotsubo cardiomyopathy. EF 25-30%. She has been started on GDMT with Toprol XL. BP remains too soft to start ACE/ARB. Previously she had been on benazepril. Will try adding this back once BP is more stable. Cardiac rehab was consulted but insurance denied. Will look into this as she is wanting to participate. Plan for repeat echo in about 2 months.  HTN: blood pressure low normal. Continue with Toprol XL  HLD  Diabetes  Hypothyroidism    Thank you for allowing me to participate in this patient's care. Please call with any questions or concerns. Mrs Quesada will follow up with Dr. Phillips in 4-6 weeks.          Your medication list            Accurate as of April 29, 2024  1:21 PM. If you have any questions, ask your nurse or doctor.                CONTINUE taking these medications        Instructions Last Dose Given Next Dose  Due   albuterol sulfate  (90 Base) MCG/ACT inhaler  Commonly known as: PROVENTIL HFA;VENTOLIN HFA;PROAIR HFA      Inhale 2 puffs Every 4 (Four) Hours As Needed.       bimatoprost 0.01 % ophthalmic drops  Commonly known as: LUMIGAN      1 drop Every Night. Instill 1 in affected eye once a day (at bedtime)       buPROPion  MG 24 hr tablet  Commonly known as: WELLBUTRIN XL      Take 1 tablet by mouth Every Morning. For mood       esomeprazole 40 MG capsule  Commonly known as: nexIUM      Take 1 capsule by mouth Every Morning Before Breakfast.       First-Testosterone 2 % ointment      Place  on the skin as directed by provider.       FLUoxetine 40 MG capsule  Commonly known as: PROzac      Take 2 capsules by mouth Daily. For depression and anxiety       Insulin Pen Needle 32G X 4 MM misc      For once daily use with daily injection       LORazepam 0.5 MG tablet  Commonly known as: ATIVAN           metaxalone 800 MG tablet  Commonly known as: SKELAXIN      Take 1 tablet by mouth 2 (Two) Times a Day As Needed for Muscle Spasms.       metFORMIN  MG 24 hr tablet  Commonly known as: GLUCOPHAGE-XR      TAKE 1 TABLET DAILY       metoprolol succinate XL 25 MG 24 hr tablet  Commonly known as: TOPROL-XL      Take 1 tablet by mouth Daily.       montelukast 10 MG tablet  Commonly known as: SINGULAIR      Take 1 tablet by mouth Every Night. For allergies       mupirocin 2 % ointment  Commonly known as: Bactroban      Apply  topically to the appropriate area as directed 3 (Three) Times a Day. To wound area until healed       NP Thyroid 90 MG tablet  Generic drug: Thyroid           NP Thyroid 60 MG tablet  Generic drug: Thyroid      Take 1 tablet by mouth.       Rocklatan 0.02-0.005 % solution  Generic drug: Netarsudil-Latanoprost           terconazole 0.8 % vaginal cream  Commonly known as: TERAZOL 3      Insert 1 applicator into the vagina Every Night. X 3 days for yeast       timolol 0.5 % ophthalmic  solution  Commonly known as: TIMOPTIC           tobramycin-dexAMETHasone 0.3-0.1 % ophthalmic suspension  Commonly known as: TOBRADEX           valACYclovir 1000 MG tablet  Commonly known as: VALTREX      TAKE 2 TABLETS BY MOUTH AT ONSET FOR FEVER BLISTER. REPEAT THIS DOSE 1 TIME IN 12 HOURS       Vitamin D3 50 MCG (2000 UT) tablet      Take by mouth. Take 1 capsule by oral route daily for 90 days       Wegovy 0.25 MG/0.5ML solution auto-injector  Generic drug: Semaglutide-Weight Management      Inject 0.25 mg under the skin into the appropriate area as directed 1 (One) Time Per Week. Inject 0.25 mg SQ weekly x4       Xiidra 5 % ophthalmic solution  Generic drug: Lifitegrast                      Carmen Shipley, APRN  04/29/24  12:39 PM EDT

## 2024-05-03 NOTE — PAYOR COMM NOTE
"Eileen Avery (64 y.o. Female)                            ATTENTION RECONSIDERATION CLINICALS PENDING CASE REF #[id number]                         REPLY TO UR DEPT  [phone] OR CALL   YOBANI CHOPRA LPCANDIDO           Date of Birth   1960    Social Security Number       Address   [address] [place] [address]    Home Phone   [phone]    MRN   [id number]       Gnosticism   Voodoo    Marital Status                               Admission Date   4/20/24    Admission Type   Urgent    Admitting Provider   Marjorie Phillips MD    Attending Provider       Department, Room/Bed   [facility] CARDIOVASC UNIT, 2201/1       Discharge Date   4/22/2024    Discharge Disposition   Home or Self Care    Discharge Destination                                 Attending Provider: (none)   Allergies: Carrot [Daucus Carota], Banana, Augmentin [Amoxicillin-pot Clavulanate]    Isolation: None   Infection: None   Code Status: Prior    Ht: 160 cm (63\")   Wt: 60.8 kg (134 lb)    Admission Cmt: None   Principal Problem: NICM (nonischemic cardiomyopathy) [I42.8]                   Active Insurance as of 4/20/2024       Primary Coverage       Payor Plan Insurance Group Employer/Plan Group    ANTHEM BLUE CROSS ANTHEM BLUE CROSS BLUE SHIELD PPO [id number]       Payor Plan Address Payor Plan Phone Number Payor Plan Fax Number Effective Dates    [address] [phone]  6/1/2022 - None Entered    [place] [address]         Subscriber Name Subscriber Birth Date Member ID       EILEEN AVERY 1960 [id number]                     Emergency Contacts        (Rel.) Home Phone Work Phone Mobile Phone    Damien Avery (Spouse) -- -- [phone]    MANUEL OLIVARES (Friend) [phone] -- [phone]                 History & Physical        Marjorie Phillips MD at 04/21/24 31            [facility] History And Physical Assessment    Patient Name: Eileen Avery  Age/Sex: " 63 y.o. female  : 1960  MRN: [id number]    Date of Hospital Admission: 2024  Date of Encounter Visit: 24  Encounter Provider: Marjorie Phillips MD  Place of Service: [facility] CARDIOLOGY  Patient Care Team:  Dasia Glez PA-C as PCP - General (Family Medicine)  Scarlett Irby MD as Consulting Physician (Obstetrics)  Rene Haro MD as Consulting Physician (Gastroenterology)  Tim Schmid MD as Consulting Physician (Otolaryngology)  Sofie Salter MD as Consulting Physician (Orthopedic Surgery)  Lorraine Zamora MD as Consulting Physician (Pain Medicine)  Lincoln Leung MD as Consulting Physician (Dermatology)  Filipe Orozco MD as Consulting Physician (Hand Surgery)  Ivonne Graves MD as Consulting Physician (Ophthalmology)  Marlena Cook MD (Psychiatry)  Maria Luisa Donohue MD as Consulting Physician (Gastroenterology)  Maycol Haynes MD as Consulting Physician (Hematology and Oncology)  Dasia Glez PA-C as Referring Physician (Family Medicine)    Subjective:     Chief Complaint: Chest pain    History of Present Illness:  Arlene Quesada is a 63 y.o. female with anxiety, depression, GERD, hypothyroidism, arthritis, hypertension, hyperlipidemia, who presented to the emergency room with complaints of chest pain.    The patient reports that she was folding laundry when she had a sudden onset of substernal chest discomfort that radiated to the left side of her chest and was associated with left-sided arm numbness and shortness of breath.  She reports that she has a long history of GERD with discomfort in a similar area in the middle of her chest.  She usually takes PPI and Tums for this with relief.  However yesterday the symptoms were different that they were much more severe and radiated to the left chest.  She also noted left arm numbness and shortness of breath with the symptoms.  Reports that she was belching  a lot with the episode.  She tried taking Tums without any relief.  She opted to come to the emergency room.    She presented to the [facility] emergency room her EKG was unremarkable.  Vital signs were within normal limits.  CBC showed a mildly elevated white blood count of 12.31.  CMP was unremarkable except for mildly elevated ALT.  Initial troponin was elevated at 207 with a repeat at 411.  She was treated with nitroglycerin paste and given a dose of enoxaparin and transferred here for further management.    Overnight she has had no further chest pain.  Denies any shortness of breath currently.  She denies any prior cardiac history.  She denies any family history of cardiac disease.  She denies any symptoms preceding the episode yesterday.    Past Medical History:  Past Medical History:   Diagnosis Date    Anemia, iron deficiency     Arthritis     Depression     Gastroesophageal reflux     Glaucoma     History of complete eye exam 73138    [facility]: early glaucoma    History of EKG 03/2006    borderline QT prolonged, NSR    History of MRI of cervical spine 03/29/2011    Multilevel DDD with multilevel spinal steonsis, most severe at C5-6 and C6-7, less prominent at C3-4 and C4-5, there is neuroforaminimal compromise on the right at multiple levels    History of MRI of lumbar spine 03/29/2011    Multilevel DDD most prominent at L4-5 and L5-S1, moderate facet DDD, protrusion in L4-5 and mild disc bulge at L5-S1    Hyperlipemia     Hypertension, essential, benign     Low back pain     Nausea 01/23/2015    Reflux esophagitis     Thyroid disorder     Thyroid nodule        Past Surgical History:   Procedure Laterality Date    APPENDECTOMY      BREAST BIOPSY  09/26/2013    left - benign fibroadenoma    CHOLECYSTECTOMY      COLONOSCOPY      COLPOSCOPY      ENDOMETRIAL ABLATION      ENDOSCOPY  03/2006    Dr. Ruiz: clean based gastric ulcer    ENDOSCOPY N/A 12/13/2023    Procedure: ESOPHAGOGASTRODUODENOSCOPY with  cold biopsies;  Surgeon: Silver Rose MD;  Location: [facility] ENDOSCOPY;  Service: Gastroenterology;  Laterality: N/A;  Pre: dysphagia  Post: normal    HYSTERECTOMY  10/2010    THYROIDECTOMY, PARTIAL Right     TRABECULECTOMY Bilateral     UPPER GASTROINTESTINAL ENDOSCOPY  2021       Home Medications:   Medications Prior to Admission   Medication Sig Dispense Refill Last Dose    amLODIPine (NORVASC) 2.5 MG tablet Take 1 tablet by mouth Daily. For BP, new dose and stop generic Lotrel 90 tablet 1 4/19/2024    albuterol sulfate  (90 Base) MCG/ACT inhaler Inhale 2 puffs Every 4 (Four) Hours As Needed.   Unknown    benazepril (LOTENSIN) 10 MG tablet Take 1 tablet by mouth Daily. For BP and stop generic Lotrel 90 tablet 1     bimatoprost (LUMIGAN) 0.01 % ophthalmic drops 1 drop Every Night. Instill 1 in affected eye once a day (at bedtime)       buPROPion XL (WELLBUTRIN XL) 300 MG 24 hr tablet Take 1 tablet by mouth Every Morning. For mood 90 tablet 3     Cholecalciferol (VITAMIN D3) 2000 UNITS tablet Take by mouth. Take 1 capsule by oral route daily for 90 days       esomeprazole (nexIUM) 40 MG capsule Take 1 capsule by mouth Every Morning Before Breakfast.       FLUoxetine (PROzac) 40 MG capsule Take 2 capsules by mouth Daily. For depression and anxiety 180 capsule 3     Insulin Pen Needle 32G X 4 MM misc For once daily use with daily injection 100 each 3     LORazepam (ATIVAN) 0.5 MG tablet        metaxalone (SKELAXIN) 800 MG tablet Take 1 tablet by mouth 2 (Two) Times a Day As Needed for Muscle Spasms. 60 tablet 1     metFORMIN ER (GLUCOPHAGE-XR) 500 MG 24 hr tablet 1 p.o. with food for impaired fasting glucose 90 tablet 3     montelukast (SINGULAIR) 10 MG tablet Take 1 tablet by mouth Every Night. For allergies 90 tablet 3     mupirocin (Bactroban) 2 % ointment Apply  topically to the appropriate area as directed 3 (Three) Times a Day. To wound area until healed (Patient not taking: Reported on 2/14/2024)  30 each 1     nitrofurantoin, macrocrystal-monohydrate, (MACROBID) 100 MG capsule Take 1 capsule by mouth Daily. For prophylaxis PRN 30 capsule 5     NP Thyroid 90 MG tablet        Semaglutide-Weight Management (Wegovy) 0.25 MG/0.5ML solution auto-injector Inject 0.25 mg under the skin into the appropriate area as directed 1 (One) Time Per Week. Inject 0.25 mg SQ weekly x4 2 mL 0     terconazole (TERAZOL 3) 0.8 % vaginal cream Insert 1 applicator into the vagina Every Night. X 3 days for yeast (Patient not taking: Reported on 2024) 20 g 5     Testosterone Propionate (FIRST-TESTOSTERONE) 2 % ointment Place  on the skin as directed by provider.       timolol (TIMOPTIC) 0.5 % ophthalmic solution        tobramycin-dexamethasone (TOBRADEX) 0.3-0.1 % ophthalmic suspension        valACYclovir (VALTREX) 1000 MG tablet TAKE 2 TABLETS BY MOUTH AT ONSET FOR FEVER BLISTER. REPEAT THIS DOSE 1 TIME IN 12 HOURS 20 tablet 5     XIIDRA 5 % solution           Allergies:  Allergies   Allergen Reactions    Carrot [Daucus Carota] Anaphylaxis    Banana Itching     Throat itching      Augmentin [Amoxicillin-Pot Clavulanate] Diarrhea and GI Intolerance       Past Social History:  Social History     Socioeconomic History    Marital status:    Tobacco Use    Smoking status: Former     Current packs/day: 0.00     Types: Cigarettes     Quit date:      Years since quittin.3    Smokeless tobacco: Never    Tobacco comments:     Started at age 19/Stopped at age 45   Vaping Use    Vaping status: Never Used   Substance and Sexual Activity    Alcohol use: Yes     Comment: rare    Drug use: No    Sexual activity: Defer       Past Family History:  Family History   Problem Relation Age of Onset    Breast cancer Mother     Kidney cancer Mother     Hypertension Father     Diabetes Brother        Review of Systems:   All systems reviewed. Pertinent positives identified in HPI. All other systems are negative.    Objective:   Temp:   [97.6 °F (36.4 °C)-98 °F (36.7 °C)] 97.7 °F (36.5 °C)  Heart Rate:  [79-88] 79  Resp:  [15-16] 16  BP: (101-144)/(68-93) 101/76    Intake/Output Summary (Last 24 hours) at 4/21/2024 0732  Last data filed at 4/20/2024 2139  Gross per 24 hour   Intake 360 ml   Output --   Net 360 ml     Body mass index is 23.84 kg/m².      04/20/24  1538 04/20/24  1905   Weight: 61.7 kg (136 lb) 61.1 kg (134 lb 9.6 oz)     Weight change:     Physical Exam:   General Appearance:    Alert, cooperative, in no acute distress   Head:    Normocephalic, without obvious abnormality, atraumatic   Eyes:            Lids and lashes normal, conjunctivae and sclerae normal, no   icterus, no pallor, corneas clear, PERRLA   Ears:    Ears appear intact with no abnormalities noted   Neck:   No adenopathy, supple, trachea midline, no thyromegaly, no   carotid bruit, no JVD   Lungs:     Clear to auscultation,respirations regular, even and unlabored    Heart:    Regular rhythm and normal rate, normal S1 and S2, no murmur, no gallop, no rub, no click   Chest Wall:    No abnormalities observed   Abdomen:     Normal bowel sounds, no masses, no organomegaly, soft        non-tender, non-distended, no guarding, no rebound  tenderness   Extremities:   Moves all extremities well, no edema, no cyanosis, no redness   Pulses:   Pulses palpable and equal bilaterally. Normal radial, carotid, femoral, dorsalis pedis and posterior tibial pulses bilaterally. Normal abdominal aorta   Skin:  Psychiatric:   No bleeding, bruising or rash    Alert and oriented x 3, normal mood and affect         Lab Review:   Results from last 7 days   Lab Units 04/20/24  1746 04/20/24  1653 04/20/24  1545   SODIUM mmol/L  --   --  138   POTASSIUM mmol/L  --   --  4.1   CHLORIDE mmol/L  --   --  101   CO2 mmol/L  --   --  25.0   BUN mg/dL  --   --  11   CREATININE mg/dL 0.39* <0.30* 0.75   GLUCOSE mg/dL  --   --  93   CALCIUM mg/dL  --   --  9.5   AST (SGOT) U/L  --   --  25   ALT (SGPT) U/L   --   --  40*     Results from last 7 days   Lab Units 04/20/24  1741 04/20/24  1545   HSTROP T ng/L 411* 207*     Results from last 7 days   Lab Units 04/20/24  1545   WBC 10*3/mm3 12.31*   HEMOGLOBIN g/dL 12.8   HEMATOCRIT % 40.8   PLATELETS 10*3/mm3 560*       Imaging:  Imaging Results (Most Recent)       Procedure Component Value Units Date/Time    XR Chest 1 View [[id number]] Collected: 04/20/24 1612     Updated: 04/20/24 1616    Narrative:      XR CHEST 1 VW-4/20/2024     HISTORY: Chest pain.     Heart size is within normal limits. Lungs appear free of acute  infiltrates. Bones and soft tissues are unremarkable.       Impression:      1. No acute process.        This report was finalized on 4/20/2024 4:13 PM by Dr. Varun Tobias M.D on Workstation: [employer]             I personally viewed and interpreted the patient's EKG    Assessment/Plan:     1.  Non-STEMI.  No EKG changes.  No recurrent symptoms since admission.  2.  Hypertension.  Well-controlled.  3.  Hyperlipidemia.  4.  Diabetes mellitus type 2  5.  Hypothyroidism  6.  Depression/anxiety.    - Continue aspirin, beta-blockers.  - Follow-up on echocardiogram.  - Recommend proceeding with a cardiac catheterization.  Discussed the procedure, risk, benefits at length with the patient and she agrees to proceed.  Will proceed with cardiac catheterization today.    Marjorie Phillips MD  04/21/24  07:32 EDT                      Electronically signed by Marjorie Phillips MD at 04/21/24 1143          Emergency Department Notes        Jarrell Rodrigues, RN at 04/20/24 1820          MD Aguila informed MD Gallo with cardiology of increased troponin of 411    Electronically signed by Jarrell Rodrigues RN at 04/20/24 1821       Tavon Wadsworth, RN at 04/20/24 1814          Tavon MARIA called to update Maria Luisa MARIA in CVU    Electronically signed by Tavon Wadsworth, RN at 04/20/24 1814       Tavon Wadsworth, RN at 04/20/24 1710          Nursing report ED to floor  Arlene Courtney  "y.o.  female    HPI :  HPI (Adult)  Stated Reason for Visit: Chest pain  History Obtained From: patient    Chief Complaint  Chief Complaint   Patient presents with    Chest Pain       Admitting doctor:   Sania Gallo MD    Admitting diagnosis:   The encounter diagnosis was Chest pain at rest.    Code status:   Current Code Status       Date Active Code Status Order ID Comments User Context       Not on file            Allergies:   Carrot [daucus carota], Banana, and Augmentin [amoxicillin-pot clavulanate]    Isolation:   No active isolations    Intake and Output  No intake or output data in the 24 hours ending 04/20/24 1710    Weight:       04/20/24  1538   Weight: 61.7 kg (136 lb)       Most recent vitals:   Vitals:    04/20/24 1538 04/20/24 1630 04/20/24 1700   BP: 144/93 129/85 115/73   BP Location: Right arm     Patient Position: Sitting     Pulse: 88 81 86   Resp: 16  15   Temp: 97.6 °F (36.4 °C)     TempSrc: Oral     SpO2: 100%  100%   Weight: 61.7 kg (136 lb)     Height: 160 cm (63\")         Active LDAs/IV Access:   Lines, Drains & Airways       Active LDAs       Name Placement date Placement time Site Days    Peripheral IV 04/20/24 1543 Left Antecubital 04/20/24  1543  Antecubital  less than 1                    Labs (abnormal labs have a star):   Labs Reviewed   TROPONIN - Abnormal; Notable for the following components:       Result Value    HS Troponin T 207 (*)     All other components within normal limits    Narrative:     High Sensitive Troponin T Reference Range:  <14.0 ng/L- Negative Female for AMI  <22.0 ng/L- Negative Male for AMI  >=14 - Abnormal Female indicating possible myocardial injury.  >=22 - Abnormal Male indicating possible myocardial injury.   Clinicians would have to utilize clinical acumen, EKG, Troponin, and serial changes to determine if it is an Acute Myocardial Infarction or myocardial injury due to an underlying chronic condition.        CBC WITH AUTO DIFFERENTIAL - Abnormal; " Notable for the following components:    WBC 12.31 (*)     MCHC 31.4 (*)     Platelets 560 (*)     Lymphocyte % 19.0 (*)     Eosinophil % 8.4 (*)     Neutrophils, Absolute 7.99 (*)     Eosinophils, Absolute 1.04 (*)     All other components within normal limits   POC CHEM PANEL - Abnormal; Notable for the following components:    Glucose 133 (*)     Creatinine <0.30 (*)     CO2 Content 28.7 (*)     All other components within normal limits   POC LACTATE - Normal   COMPREHENSIVE METABOLIC PANEL   HIGH SENSITIVITIY TROPONIN T 2HR   BASIC METABOLIC PANEL   CBC AND DIFFERENTIAL    Narrative:     The following orders were created for panel order CBC & Differential.  Procedure                               Abnormality         Status                     ---------                               -----------         ------                     CBC Auto Differential[[id number]]        Abnormal            Final result                 Please view results for these tests on the individual orders.       EKG:   ECG 12 Lead Chest Pain   Preliminary Result   HEART RATE= 84  bpm   RR Interval= 714  ms   GA Interval= 150  ms   P Horizontal Axis= -5  deg   P Front Axis= 35  deg   QRSD Interval= 86  ms   QT Interval= 437  ms   QTcB= 517  ms   QRS Axis= 20  deg   T Wave Axis= 43  deg   - ABNORMAL ECG -   Sinus rhythm   Anteroseptal infarct, old   Prolonged QT interval   Baseline wander in lead(s) V2,V5,V6   Electronically Signed By:    Date and Time of Study: 2024-04-20 16:19:45      ECG 12 Lead ED Triage Standing Order; Chest Pain   Preliminary Result   HEART RATE= 85  bpm   RR Interval= 706  ms   GA Interval= 144  ms   P Horizontal Axis= 6  deg   P Front Axis= 41  deg   QRSD Interval= 92  ms   QT Interval= 449  ms   QTcB= 534  ms   QRS Axis= 33  deg   T Wave Axis= 33  deg   - ABNORMAL ECG -   Sinus rhythm   Anteroseptal infarct, old   Prolonged QT interval   Electronically Signed By:    Date and Time of Study: 2024-04-20 15:36:49           Meds given in ED:   Medications   sodium chloride 0.9 % flush 10 mL (has no administration in time range)   aspirin tablet 325 mg (325 mg Oral Given 24 1600)   nitroglycerin (NITROSTAT) ointment 0.5 inch (0.5 inches Topical Given 24 1627)   Enoxaparin Sodium (LOVENOX) syringe 60 mg (60 mg Subcutaneous Given 24 1647)       Imaging results:  XR Chest 1 View    Result Date: 2024  1. No acute process.   This report was finalized on 2024 4:13 PM by Dr. Varun Tobias M.D on Workstation: [employer]       Ambulatory status:   - self    Social issues:   Social History     Socioeconomic History    Marital status:    Tobacco Use    Smoking status: Former     Current packs/day: 0.00     Types: Cigarettes     Quit date:      Years since quittin.3    Smokeless tobacco: Never    Tobacco comments:     Started at age 19/Stopped at age 45   Substance and Sexual Activity    Alcohol use: Yes     Comment: rare    Drug use: No    Sexual activity: Defer       Peripheral Neurovascular  Peripheral Neurovascular (Adult)  Peripheral Neurovascular WDL: WDL    Neuro Cognitive  Neuro Cognitive (Adult)  Cognitive/Neuro/Behavioral WDL: WDL    Learning  Learning Assessment (Adult)  Learning Readiness and Ability: no barriers identified    Respiratory  Respiratory (Adult)  Airway WDL: WDL  Respiratory WDL  Respiratory WDL: WDL    Abdominal Pain       Pain Assessments  Pain (Adult)  (0-10) Pain Rating: Rest: 6  (0-10) Pain Rating: Activity: 7  Pain Description: sharp  Pain Radiation to: shoulder, left    NIH Stroke Scale       Tavon Wadsworth RN  24 17:10 EDT     Electronically signed by Tavon Wadsworth RN at 24 1710       Jarrell Rodrigues RN at 24 1701           EMS paged for transport    Electronically signed by Jarrell Rodrigues RN at 24 1701       Jarrell Rodrigues, RN at 24 1632          A Dr. Flakito butterfield to return call    Electronically signed by Jarrell Rodrigues RN at  04/20/24 1632       Jarrell Rodrigues RN at 04/20/24 1627          Dr. Gallo returned page at this time    Electronically signed by Jarrell Rodrigues RN at 04/20/24 1627       Jarrell Rodrigues RN at 04/20/24 1618          Page placed to interventional cardiologist per MD Aguila's request    Electronically signed by Jarrell Rodrigues RN at 04/20/24 1618       May Aguila MD at 04/20/24 1617          Subjective   History of Present Illness  63-year-old female that approximately 1 PM to 130 this afternoon she started having epigastric pain similar to her previous GERD heartburn pain.  The pain was persistent so she took Tums and the pain continued and then radiated up to her left chest she continued take more Tums and it radiated to her left arm in the form of numbness.  The episode lasted about an hour to an hour and a half at 6-7/10.  On arrival she is a 3-4 out of 10.  She had a similar episode about a week ago of about a 6-7 out of 10 that lasted less than 30 minutes without including the LUE, but did include epigastric discomfort that went superior into her chest.  She does have a history of heartburn.  Usually responsive to Tums.  She said the week ago when she had the pain that she belched and felt better.  And the pain eventually went away.  There is no family history of MI.  She has never had a heart attack that she knows of before.  She is a recently diagnosed prediabetic based on her Hb 1 AC and is on medication.  She currently does not have pain in her arm which was more like numbness.  As I write this she is now telling me she  has very little epigastric discomfort and no chest pain and no arm numbness. Discomfort maybe a 2/10.      Review of Systems    Past Medical History:   Diagnosis Date    Anemia, iron deficiency     Arthritis     Depression     Gastroesophageal reflux     Glaucoma     History of complete eye exam [id number]    [facility]: early glaucoma    History of EKG 03/2006    borderline QT prolonged, NSR     History of MRI of cervical spine 2011    Multilevel DDD with multilevel spinal steonsis, most severe at C5-6 and C6-7, less prominent at C3-4 and C4-5, there is neuroforaminimal compromise on the right at multiple levels    History of MRI of lumbar spine 2011    Multilevel DDD most prominent at L4-5 and L5-S1, moderate facet DDD, protrusion in L4-5 and mild disc bulge at L5-S1    Hyperlipemia     Hypertension, essential, benign     Low back pain     Nausea 2015    Reflux esophagitis     Thyroid disorder     Thyroid nodule        Allergies   Allergen Reactions    Carrot [Daucus Carota] Anaphylaxis    Banana Itching     Throat itching      Augmentin [Amoxicillin-Pot Clavulanate] Diarrhea and GI Intolerance       Past Surgical History:   Procedure Laterality Date    APPENDECTOMY      BREAST BIOPSY  2013    left - benign fibroadenoma    CHOLECYSTECTOMY      COLONOSCOPY      COLPOSCOPY      ENDOMETRIAL ABLATION      ENDOSCOPY  2006    Dr. Ruiz: clean based gastric ulcer    ENDOSCOPY N/A 2023    Procedure: ESOPHAGOGASTRODUODENOSCOPY with cold biopsies;  Surgeon: Silver Rose MD;  Location: [facility] ENDOSCOPY;  Service: Gastroenterology;  Laterality: N/A;  Pre: dysphagia  Post: normal    HYSTERECTOMY  10/2010    THYROIDECTOMY, PARTIAL Right     TRABECULECTOMY Bilateral     UPPER GASTROINTESTINAL ENDOSCOPY         Family History   Problem Relation Age of Onset    Breast cancer Mother     Kidney cancer Mother     Hypertension Father     Diabetes Brother        Social History     Socioeconomic History    Marital status:    Tobacco Use    Smoking status: Former     Current packs/day: 0.00     Types: Cigarettes     Quit date:      Years since quittin.3    Smokeless tobacco: Never    Tobacco comments:     Started at age 19/Stopped at age 45   Substance and Sexual Activity    Alcohol use: Yes     Comment: rare    Drug use: No    Sexual activity: Defer            Objective   Physical Exam  Vitals and nursing note reviewed.   Constitutional:       Appearance: She is well-developed and normal weight.   HENT:      Head: Atraumatic.   Eyes:      Extraocular Movements: Extraocular movements intact.   Cardiovascular:      Rate and Rhythm: Normal rate and regular rhythm.      Heart sounds: Normal heart sounds. No murmur heard.  Pulmonary:      Effort: Pulmonary effort is normal.   Chest:      Chest wall: No deformity, tenderness or crepitus.   Abdominal:      Palpations: Abdomen is soft.   Musculoskeletal:         General: Normal range of motion.      Cervical back: Normal range of motion and neck supple.   Skin:     General: Skin is warm and dry.      Capillary Refill: Capillary refill takes less than 2 seconds.   Neurological:      General: No focal deficit present.      Mental Status: She is alert. She is disoriented.   Psychiatric:         Mood and Affect: Mood normal.         Behavior: Behavior normal.         Procedures          ED Course  ED Course as of 04/20/24 1817   Sat Apr 20, 2024   1629 Discussed with Dr. Sania Gallo regarding HPI and past medical history the troponin and the first EKG and the second EKG actually is normal as well.  Placed Nitropaste gave aspirin once Lovenox 1 mg/kg given and transferred to the [facility]. [AR]   1636 Dr Gallo accepted. [AR]   1639 Transfer paperwork completed for going to [facility], she has a bed under Dr. Gallo's name. [AR]   1643 Our instrument is down here for complete comprehensive metabolic panel and a basic metabolic panel has been ordered. [AR]   1645 I discussed with the patient and family the findings of a normal EKG and elevated troponin with that means that likely earlier today between 1 and 130 when she had the pain for nearly an hour she was having a heart attack.  The normal EKG tells me she is past that point and is not currently having a heart attack however she is at a risk of having a repeated heart  attack at any moment.  That she needs to be admitted and cardiology needs to see her and decide when to take her to Cath Lab.  She and her family understood and agreed.  This discussion took place about 45 minutes ago. [AR]   1725 Patient's creatinine is less than 0.30; lactate 0.9.  2-hour Trope pending. [AR]   1806 Troponin has doubled to 411, Dr FREDERIC Gallo will be informed by the BB charge RN, ambulance in route. [AR]   1816 Discussed with Dr. Gallo the troponin findings.  Patient is without pain right now.  An ambulance is just arrived to take her over there I do not want to delay any more care as she is already had aspirin and Nitropaste and has currently no symptoms.  I expect that she says starting at 25 or 30 like most people do she started at 200 and doubled as other people would double from 25-50 or 30-60.  Prefer for her to arrive sooner rather than later for Dr. Gallo to evaluate and make decisions on her care. [AR]      ED Course User Index  [AR] May Aguila MD                HEART Score: 6                            Medical Decision Making  Differential diagnosis includes but not limited to MI, PE, GERD, esophagitis gastritis aortic dissection.    The initial EKG was normal the troponin came back 200 suggesting that the earlier event today was an MI.  Call was made to cardiology spoke with Dr. Sania Gallo to admit and calls out to hospitalist to admit and they expect to take her to the Cath Lab tomorrow unless she changes on admission at some point.    Aspirin and nitro patient was placed and Lovenox 1 mg/kg was given.    While patient is still in the ER we will monitor her closely.        Problems Addressed:  Chest pain at rest: complicated acute illness or injury    Amount and/or Complexity of Data Reviewed  Labs: ordered. Decision-making details documented in ED Course.  Radiology: ordered and independent interpretation performed.     Details: Portable chest x-ray no pneumonia no pleural effusions  no pneumothorax silhouette within normal limits.  ECG/medicine tests: ordered and independent interpretation performed.     Details: EKG #1 on arrival normal sinus rhythm rate 85 suspicious for old anterior septal infarct V1 V2 however I do not have an old one to compare to either or if this is related to the chest pain a week ago.  QT interval is 449 and QTc slightly elevated as well as 534  Post troponin discovery of over 200 repeated the EKG to see where we were at 1619.  Normal sinus rhythm rate 84 still says old anterior infarct less than 1 tiny box in V1 quite possibly related to a week ago or today.  Prolonged QT with a QTc of 517.    Risk  OTC drugs.  Prescription drug management.  Decision regarding hospitalization.  Risk Details: Patient's first EKG suggested an old septal infarct anterior without an active current MI noted however when troponin came back her troponin was 200+.  Suggesting that the event today was an actual MI and she has since returned flow to her heart and it is not showing up on EKG as a significant active movement.  She was already getting aspirin and Nitropaste was placed on her chest in order to help open up the arteries without dropping her blood pressure and stressing her heart more.  She also had Lovenox given 1 mg Per kilogram and repeat EKG to see where she was on her EKG which had not changed from the previous an hour before.  Been monitoring her level of pain for the past hour and discussed with Dr. Sania Gallo the HPI, the current findings by lab and EKG.  She agreed to admit.    Critical Care  Total time providing critical care: 30 minutes        Final diagnoses:   Chest pain at rest       ED Disposition  ED Disposition       ED Disposition   Decision to Admit    Condition   --    Comment   Level of Care: Telemetry [5]   Diagnosis: Chest pain [058068]   Admitting Physician: SANIA GALLO [[id number]]   Attending Physician: SANIA GALLO [[id number]]   Certification: I Certify  That Inpatient Hospital Services Are Medically Necessary For Greater Than 2 Midnights                 No follow-up provider specified.       Medication List      No changes were made to your prescriptions during this visit.           Electronically signed by May Aguila MD at 04/20/24 1817       Vital Signs       Date/Time Temp Temp src Pulse Resp BP Patient Position SpO2    04/22/24 1156 97.4 (36.3) Oral 90 16 103/73 Sitting 98    04/22/24 0809 98.5 (36.9) Oral 91 18 113/76 Lying 96    04/22/24 0437 98.5 (36.9) Oral 93 16 125/83 Sitting 98    04/22/24 0000 98.5 (36.9) Oral 87 16 104/67 Lying 93    04/21/24 1916 98 (36.7) Oral 76 16 104/78 -- 95    04/21/24 1605 97.6 (36.4) Oral 75 16 99/76 Lying 94    04/21/24 1530 -- -- 78 -- -- -- 94    04/21/24 1500 -- -- 77 -- -- -- 93    04/21/24 1445 97.8 (36.6) Oral 74 16 92/72 Lying 93    04/21/24 1417 -- -- 77 -- 103/76 -- 91    04/21/24 1415 -- -- 78 -- 101/82 -- 94    04/21/24 1345 -- -- 75 -- 94/74 -- 94    04/21/24 1315 -- -- 74 -- 108/82 -- 93    04/21/24 1300 -- -- 75 -- 108/75 -- 94    04/21/24 1245 -- -- 75 -- 99/76 -- 96    04/21/24 1217 98.1 (36.7) Oral -- -- -- -- --    04/21/24 1215 -- -- 78 -- 110/82 -- 94    04/21/24 1200 -- -- 75 -- 104/70 -- 93    04/21/24 1145 -- -- 75 -- 111/81 -- 97    04/21/24 1130 -- -- 78 -- 107/75 -- 96    04/21/24 1115 -- -- 73 -- 104/79 -- 95    04/21/24 10:58:05 -- -- -- 16 -- -- --    04/21/24 10:53:22 -- -- -- 16 -- -- --    04/21/24 10:47:52 -- -- -- 16 -- -- --    04/21/24 10:42:03 -- -- -- 14 -- -- --    04/21/24 10:35:42 -- -- -- 14 -- -- --    04/21/24 10:28:52 -- -- -- 14 -- -- --    04/21/24 10:22:49 -- -- -- 14 -- -- --    04/21/24 10:17:57 -- -- -- 14 -- -- --    04/21/24 10:13:15 -- -- -- 16 -- -- --    04/21/24 10:06:41 -- -- -- 14 -- -- --    04/21/24 10:01:45 -- -- -- 16 -- -- --    04/21/24 09:56:48 -- -- -- 18 -- -- --    04/21/24 09:51:49 -- -- -- 20 -- -- --    04/21/24 0802 97.8 (36.6) Oral 79 16 109/75  Sitting 98    04/21/24 0350 97.7 (36.5) Oral 79 16 101/76 Lying --    04/20/24 2345 97.8 (36.6) Oral 87 16 106/68 Lying --    04/20/24 1944 98 (36.7) Oral -- 16 119/91 Lying --    04/20/24 1800 98 (36.7) -- 84 16 116/82 -- 99    04/20/24 1730 -- -- 85 -- 118/82 -- --    04/20/24 1700 -- -- 86 15 115/73 -- 100    04/20/24 1630 -- -- 81 -- 129/85 -- --    04/20/24 1538 97.6 (36.4) Oral 88 16 144/93 Sitting 100          Oxygen Therapy (last 3 days) before discharge       Date/Time SpO2 Device (Oxygen Therapy) Flow (L/min) Oxygen Concentration (%) ETCO2 (mmHg)    04/22/24 1202 -- room air -- -- --    04/22/24 1156 98 -- -- -- --    04/22/24 0852 -- room air -- -- --    04/22/24 0809 96 -- -- -- --    04/22/24 0437 98 -- -- -- --    04/22/24 0000 93 -- -- -- --    04/21/24 2000 -- room air -- -- --    04/21/24 1916 95 room air -- -- --    04/21/24 1605 94 room air -- -- --    04/21/24 1530 94 room air -- -- --    04/21/24 1500 93 room air -- -- --    04/21/24 1445 93 room air -- -- --    04/21/24 1417 91 room air -- -- --    04/21/24 1415 94 -- -- -- --    04/21/24 1345 94 room air -- -- --    04/21/24 1315 93 room air -- -- --    04/21/24 1300 94 -- -- -- --    04/21/24 1245 96 room air -- -- --    04/21/24 1215 94 room air -- -- --    04/21/24 1200 93 room air -- -- --    04/21/24 1145 97 room air -- -- --    04/21/24 1130 96 room air -- -- --    04/21/24 1115 95 room air -- -- --    04/21/24 10:02:04 -- nasal cannula with ETCO2 3 -- --    04/21/24 0802 98 room air -- -- --    04/20/24 2000 -- room air -- -- --    04/20/24 1944 -- room air -- -- --    04/20/24 1800 99 -- -- -- --    04/20/24 1700 100 -- -- -- --    04/20/24 1538 100 -- -- -- --          Facility-Administered Medications as of 4/22/2024   Medication Dose Route Frequency Provider Last Rate Last Admin    [COMPLETED] aspirin tablet 325 mg  325 mg Oral Once May Aguila MD   325 mg at 04/20/24 1600    [COMPLETED] Enoxaparin Sodium (LOVENOX) syringe 60  mg  1 mg/kg Subcutaneous Once May Aguila MD   60 mg at 24 1647    [COMPLETED] nitroglycerin (NITROSTAT) ointment 0.5 inch  0.5 inch Topical Once May Aguila MD   0.5 inch at 24 1627    [COMPLETED] perflutren (DEFINITY) 8.476 mg in Sodium Chloride (PF) 0.9 % 10 mL injection  3 mL Intravenous Once in imaging Sania Glalo MD   3 mL at 24 0857    [COMPLETED] sodium chloride 0.9 % bolus 1,000 mL  1,000 mL Intravenous Once May Aguila MD   Stopped at 24 1818    [COMPLETED] sodium chloride 0.9 % infusion    Code / Trauma / Sedation Continuous Med Marjorie Phillips MD 75 mL/hr at 24 0956 75 mL/hr at 24 0956    [] sodium chloride 0.9 % infusion  75 mL/hr Intravenous Continuous Marjorie Phillips MD   Stopped at 24 2208     Orders (all)        Start     Ordered    24 0000  metoprolol succinate XL (TOPROL-XL) 25 MG 24 hr tablet  Every 24 Hours Scheduled         24 0913    24 0912  Discharge patient  Once         24 0913    24 0600  pantoprazole (PROTONIX) EC tablet 40 mg  Every Early Morning,   Status:  Discontinued         24 2102  calcium carbonate (TUMS) chewable tablet 500 mg (200 mg elemental)  3 Times Daily PRN,   Status:  Discontinued         24 1451  POC Activated Clotting Time  PROCEDURE ONCE,   Status:  Canceled         24 1427    24 1428  POC Activated Clotting Time  PROCEDURE ONCE         24 1351    24 1215  sodium chloride 0.9 % infusion  Continuous         24 1115    24 1200  Strict intake and output  Every 4 Hours,   Status:  Canceled       24 1115    24 1116  Continuous Cardiac Monitoring  Continuous,   Status:  Canceled        Comments: Follow Standing Orders As Outlined in Process Instructions (Open Order Report to View Full Instructions)    24 1115    24 1116  Maintain IV Access  Continuous,   Status:   Canceled         04/21/24 1115 04/21/24 1116  Telemetry - Place Orders & Notify Provider of Results When Patient Experiences Acute Chest Pain, Dysrhythmia or Respiratory Distress  Continuous,   Status:  Canceled        Comments: Open Order Report to View Parameters Requiring Provider Notification    04/21/24 1115 04/21/24 1116  May Be Off Telemetry for Tests  Continuous,   Status:  Canceled         04/21/24 1115 04/21/24 1116  Encourage fluids  Until Discontinued,   Status:  Canceled         04/21/24 1115 04/21/24 1116  Activity - Strict Bed Rest  Until Discontinued,   Status:  Canceled         04/21/24 1115 04/21/24 1116  Remove Radial Pressure Device / Dressing  Once         04/21/24 1115 04/21/24 1116  Vital Signs & Reverse Jose's Test (While Radial Compression Device in Place)  Per Order Details,   Status:  Canceled        Comments: Every 15 Minutes x4, Every 30 Minutes x4, & Every 1 Hour x2    04/21/24 1115 04/21/24 1116  Keep Affected Arm Straight & Elevated  Continuous,   Status:  Canceled         04/21/24 1115 04/21/24 1116  Activity As Tolerated  Until Discontinued,   Status:  Canceled         04/21/24 1115 04/21/24 1116  Oxygen Therapy- Nasal Cannula; Titrate 1-6 LPM Per SpO2; 90 - 95%  Continuous,   Status:  Canceled         04/21/24 1115 04/21/24 1116  Continuous Pulse Oximetry  Continuous,   Status:  Canceled         04/21/24 1115 04/21/24 1116  Advance Diet As Tolerated -  Until Discontinued,   Status:  Canceled         04/21/24 1115 04/21/24 1116  Notify MD if platelet count is less than 100,000, is less than 1/2 baseline, or if Hgb drops by more than 3mg/dl.  Until Discontinued,   Status:  Canceled         04/21/24 1115 04/21/24 1116  Notify MD of hypotension (SBP less than 95), bleeding, or dysrythmia and follow Sheath Removal Policy if needed.  Until Discontinued,   Status:  Canceled         04/21/24 1115 04/21/24 1116  Hold metFORMIN (GLUCOPHAGE) for 48  "Hours  Continuous,   Status:  Canceled         04/21/24 1115    04/21/24 1116  Assess Puncture Site, Vital SIgns, Distal Pulses & Observe Site for Bleeding or Swelling  Per Order Details,   Status:  Canceled        Comments: Every 15 Minutes x4, Every 30 Minutes x4, & Every 1 Hour x2    04/21/24 1115 04/21/24 1116  Remove Femoral / Brachial Sheaths  Once         04/21/24 1115 04/21/24 1116  Apply Femostop  Per Order Details,   Status:  Canceled        Comments: For Groin Bleeding, Notify MD or PA If Applied    04/21/24 1115 04/21/24 1116  Diet: Cardiac, Diabetic; Healthy Heart (2-3 Na+); Consistent Carbohydrate; Fluid Consistency: Thin (IDDSI 0)  Diet Effective Now,   Status:  Canceled         04/21/24 1115    04/21/24 1115  Vital Signs  As Needed,   Status:  Canceled       04/21/24 1115 04/21/24 1115  Change site dressing  As Needed,   Status:  Canceled       04/21/24 1115 04/21/24 1115  acetaminophen (TYLENOL) tablet 650 mg  Every 4 Hours PRN,   Status:  Discontinued         04/21/24 1115    04/21/24 1115  morphine injection 1 mg  Every 4 Hours PRN,   Status:  Discontinued        Placed in \"And\" Linked Group    04/21/24 1115 04/21/24 1115  naloxone (NARCAN) injection 0.4 mg  Every 5 Minutes PRN,   Status:  Discontinued        Placed in \"And\" Linked Group    04/21/24 1115 04/21/24 1115  ondansetron ODT (ZOFRAN-ODT) disintegrating tablet 4 mg  Every 6 Hours PRN,   Status:  Discontinued        Placed in \"Or\" Linked Group    04/21/24 1115    04/21/24 1115  ondansetron (ZOFRAN) injection 4 mg  Every 6 Hours PRN,   Status:  Discontinued        Placed in \"Or\" Linked Group    04/21/24 1115    04/21/24 1115  Head of Bed: Flat; 1 Hour; 1 Hour; Activity Level: Strict Bed Rest; Keep Affected Extremity/ Extremities Straight; Total Bedrest Time? 2 Hours  As Needed,   Status:  Canceled       04/21/24 1115    04/21/24 1115  atropine sulfate injection 0.5 mg  Every 5 Minutes PRN,   Status:  Discontinued      "    04/21/24 1115    04/21/24 1115  sodium chloride 0.9 % infusion 250 mL  Once As Needed,   Status:  Discontinued         04/21/24 1115    04/21/24 1051  iopamidol (ISOVUE-370) 76 % injection  Code / Trauma / Sedation Medication,   Status:  Discontinued         04/21/24 1051    04/21/24 1009  nitroGLYCERIN 200 mcg/mL 30 mL syringe  Code / Trauma / Sedation Medication,   Status:  Discontinued         04/21/24 1009    04/21/24 1009  verapamil (ISOPTIN) injection  Code / Trauma / Sedation Medication,   Status:  Discontinued         04/21/24 1009    04/21/24 1009  heparin (porcine) injection  Code / Trauma / Sedation Medication,   Status:  Discontinued         04/21/24 1009    04/21/24 1007  lidocaine (XYLOCAINE) 2% injection  Code / Trauma / Sedation Medication,   Status:  Discontinued         04/21/24 1008    04/21/24 0958  midazolam (VERSED) injection  Code / Trauma / Sedation Medication,   Status:  Discontinued         04/21/24 0958    04/21/24 0957  fentaNYL citrate (PF) (SUBLIMAZE) injection  Code / Trauma / Sedation Medication,   Status:  Discontinued         04/21/24 0957    04/21/24 0956  sodium chloride 0.9 % infusion  Code / Trauma / Sedation Continuous Med         04/21/24 1001    04/21/24 0945  sodium chloride 0.9 % infusion  Continuous,   Status:  Discontinued         04/21/24 0846    04/21/24 0945  perflutren (DEFINITY) 8.476 mg in Sodium Chloride (PF) 0.9 % 10 mL injection  Once in Imaging         04/21/24 0857    04/21/24 0924  Cardiac Catheterization/Vascular Study  Once         04/21/24 0923    04/21/24 0843  Obtain Informed Consent  Once         04/21/24 0846    04/21/24 0841  Case Request Cath Lab: Left Heart Cath, Coronary angiography  Once         04/21/24 0846    04/21/24 0800  Oral Care  2 Times Daily,   Status:  Canceled       04/20/24 1946    04/21/24 0734  Basic Metabolic Panel  Once         04/21/24 0733    04/21/24 0734  High Sensitivity Troponin T  Once         04/21/24 0733    04/21/24  0730  Thyroid (ARMOUR) tablet 90 mg  Every Morning Before Breakfast,   Status:  Discontinued         04/20/24 1942 04/21/24 0700  buPROPion XL (WELLBUTRIN XL) 24 hr tablet 300 mg  Every Morning,   Status:  Discontinued         04/20/24 1942 04/21/24 0646  Inpatient Consult to Advance Care Planning  Once        Provider:  (Not yet assigned)    04/21/24 0645    04/21/24 0001  NPO Diet NPO Type: Strict NPO  Diet Effective Midnight,   Status:  Canceled         04/20/24 1946 04/21/24 0000  Ambulatory Referral to Cardiac Rehab         04/21/24 1058    04/20/24 2100  latanoprost (XALATAN) 0.005 % ophthalmic solution 1 drop  Nightly,   Status:  Discontinued         04/20/24 1942 04/20/24 2100  montelukast (SINGULAIR) tablet 10 mg  Nightly,   Status:  Discontinued         04/20/24 1942 04/20/24 2100  sodium chloride 0.9 % flush 10 mL  Every 12 Hours Scheduled,   Status:  Discontinued         04/20/24 1946 04/20/24 2045  metoprolol succinate XL (TOPROL-XL) 24 hr tablet 25 mg  Every 24 Hours Scheduled,   Status:  Discontinued         04/20/24 1946 04/20/24 2045  nitroglycerin (NITROSTAT) ointment 0.5 inch  3 Times Daily (Nitrates),   Status:  Discontinued         04/20/24 1954 04/20/24 2030  FLUoxetine (PROzac) capsule 80 mg  Daily,   Status:  Discontinued         04/20/24 1942 04/20/24 2030  timolol (TIMOPTIC) 0.5 % ophthalmic solution 1 drop  Daily,   Status:  Discontinued         04/20/24 1942 04/20/24 2030  tobramycin-dexAMETHasone (TOBRADEX) 0.3-0.1 % ophthalmic suspension 1 drop  Once,   Status:  Discontinued         04/20/24 1942 04/20/24 2030  amLODIPine (NORVASC) tablet 2.5 mg  Daily,   Status:  Discontinued         04/20/24 1942 04/20/24 2000  Vital Signs  Every 4 Hours,   Status:  Canceled       04/20/24 1946 04/20/24 1953  ECG 12 Lead Chest Pain  Once         04/20/24 1953 04/20/24 1947  Adult Transthoracic Echo Complete W/ Cont if Necessary Per Protocol  Once          "04/20/24 1947 04/20/24 1946  sennosides-docusate (PERICOLACE) 8.6-50 MG per tablet 2 tablet  2 Times Daily PRN,   Status:  Discontinued        Placed in \"And\" Linked Group    04/20/24 1946 04/20/24 1946  polyethylene glycol (MIRALAX) packet 17 g  Daily PRN,   Status:  Discontinued        Placed in \"And\" Linked Group    04/20/24 1946 04/20/24 1946  bisacodyl (DULCOLAX) EC tablet 5 mg  Daily PRN,   Status:  Discontinued        Placed in \"And\" Linked Group    04/20/24 1946 04/20/24 1946  bisacodyl (DULCOLAX) suppository 10 mg  Daily PRN,   Status:  Discontinued        Placed in \"And\" Linked Group    04/20/24 1946 04/20/24 1946  acetaminophen (TYLENOL) 160 MG/5ML oral solution 650 mg  Every 4 Hours PRN,   Status:  Discontinued         04/20/24 1946 04/20/24 1944  Weigh Patient  Once,   Status:  Canceled         04/20/24 1946 04/20/24 1944  Insert Peripheral IV  Once,   Status:  Canceled         04/20/24 1946 04/20/24 1944  Saline Lock & Maintain IV Access  Continuous,   Status:  Canceled         04/20/24 1946 04/20/24 1944  Code Status and Medical Interventions:  Continuous,   Status:  Canceled         04/20/24 1946 04/20/24 1944  VTE Prophylaxis Not Indicated: Other: Patient Currently Anticoagulated / Receiving Prophylaxis  Once         04/20/24 1946 04/20/24 1944  Continuous Cardiac Monitoring  Continuous,   Status:  Canceled        Comments: Follow Standing Orders As Outlined in Process Instructions (Open Order Report to View Full Instructions)    04/20/24 1946 04/20/24 1944  Maintain IV Access  Continuous,   Status:  Canceled         04/20/24 1946 04/20/24 1944  Telemetry - Place Orders & Notify Provider of Results When Patient Experiences Acute Chest Pain, Dysrhythmia or Respiratory Distress  Continuous,   Status:  Canceled        Comments: Open Order Report to View Parameters Requiring Provider Notification    04/20/24 1946 04/20/24 1944  May Be Off Telemetry for Tests  " Continuous,   Status:  Canceled         04/20/24 1946 04/20/24 1944  Activity - Ad Ynes  Until Discontinued,   Status:  Canceled         04/20/24 1946 04/20/24 1944  Diet: Cardiac; Healthy Heart (2-3 Na+); Fluid Consistency: Thin (IDDSI 0)  Diet Effective Now,   Status:  Canceled         04/20/24 1946 04/20/24 1943  nitroglycerin (NITROSTAT) SL tablet 0.4 mg  Every 5 Minutes PRN,   Status:  Discontinued         04/20/24 1946 04/20/24 1943  sodium chloride 0.9 % flush 10 mL  As Needed,   Status:  Discontinued         04/20/24 1946 04/20/24 1943  sodium chloride 0.9 % infusion 40 mL  As Needed,   Status:  Discontinued         04/20/24 1946 04/20/24 1939  LORazepam (ATIVAN) tablet 0.5 mg  Every 6 Hours PRN,   Status:  Discontinued         04/20/24 1942 04/20/24 1938  albuterol (PROVENTIL) nebulizer solution 0.083% 2.5 mg/3mL  Every 6 Hours PRN,   Status:  Discontinued         04/20/24 1942 04/20/24 1749  POC Chem Panel  PROCEDURE ONCE         04/20/24 1746    04/20/24 1745  High Sensitivity Troponin T 2Hr  PROCEDURE ONCE         04/20/24 1609    04/20/24 1745  sodium chloride 0.9 % bolus 1,000 mL  Once         04/20/24 1725    04/20/24 1700  Enoxaparin Sodium (LOVENOX) syringe 60 mg  Once         04/20/24 1631    04/20/24 1659  POC Chem Panel  PROCEDURE ONCE         04/20/24 1653    04/20/24 1659  POC Lactate  PROCEDURE ONCE         04/20/24 1653    04/20/24 1645  nitroglycerin (NITROSTAT) ointment 0.5 inch  Once         04/20/24 1618    04/20/24 1643  Basic Metabolic Panel  Once,   Status:  Canceled         04/20/24 1642    04/20/24 1638  Inpatient Admission  Once         04/20/24 1637    04/20/24 1631  Initiate Observation Status  Once         04/20/24 1635    04/20/24 1617  ECG 12 Lead Chest Pain  Once         04/20/24 1616    04/20/24 1600  aspirin tablet 325 mg  Once         04/20/24 1532    04/20/24 1533  ED Acknowledgement Form Needed;  Once        Comments: Obtain ED acknowledgement form  once pt stabilized and has been seen by Provider    04/20/24 1532    04/20/24 1533  NPO Diet NPO Type: Strict NPO  Diet Effective Now,   Status:  Canceled         04/20/24 1532    04/20/24 1533  Undress and Gown  Once         04/20/24 1532    04/20/24 1533  Cardiac Monitoring  Continuous,   Status:  Canceled        Comments: Follow Standing Orders As Outlined in Process Instructions (Open Order Report to View Full Instructions)    04/20/24 1532    04/20/24 1533  Continuous Pulse Oximetry  Continuous,   Status:  Canceled         04/20/24 1532    04/20/24 1533  ECG 12 Lead ED Triage Standing Order; Chest Pain  Once         04/20/24 1532    04/20/24 1533  Insert Peripheral IV  Once,   Status:  Canceled         04/20/24 1532    04/20/24 1533  CBC & Differential  Once         04/20/24 1532    04/20/24 1533  Comprehensive Metabolic Panel  Once         04/20/24 1532    04/20/24 1533  High Sensitivity Troponin T  Once         04/20/24 1532    04/20/24 1533  XR Chest 1 View  1 Time Imaging         04/20/24 1532    04/20/24 1533  CBC Auto Differential  PROCEDURE ONCE         04/20/24 1533    04/20/24 1532  Oxygen Therapy- Nasal Cannula; Titrate 1-6 LPM Per SpO2; 90 - 95%  Continuous PRN,   Status:  Canceled       04/20/24 1532    04/20/24 1532  ECG 12 Lead ED Triage Standing Order; Chest Pain  As Needed,   Status:  Canceled      Comments: Persistent, Unrelieved or Recurrent Chest Pain    04/20/24 1532    04/20/24 1532  sodium chloride 0.9 % flush 10 mL  As Needed,   Status:  Discontinued         04/20/24 1532    04/20/24 0000  Telemetry Scan  Once         04/20/24 0000    04/20/24 0000  ECG Scan  Once         04/20/24 0000    04/20/24 0000  Telemetry Scan  Once         04/20/24 0000    04/20/24 0000  Telemetry Scan  Once         04/20/24 0000    04/20/24 0000  Telemetry Scan  Once         04/20/24 0000    04/20/24 0000  Telemetry Scan  Once         04/20/24 0000    04/20/24 0000  Telemetry Scan  Once         04/20/24 0000     24 0000  Telemetry Scan  Once         24 0000                     Physician Progress Notes (all)        Sania Gallo MD at 24 1948          Pt came to [facility] ER with CP which she thought was indigestion. Troponin positive. No acute EKG changes. Transferred to [facility] for further evaluation. Received one dose of lovenox at [facility]. Held metformin and semaglutide (unsure of last dose).  NPO after MN. Echo ordered for .    Electronically signed by Sania Gallo MD at 24        [facility] CATH LAB  [address] [place] [address] [phone]              Cardiac Catheterization/Vascular Study    Accession Number: [id number]   Date of Study: 24   Ordering Provider: Marjorie Phillips MD   Referring: Dasia Glez PA-C   Clinical Indications: NSTEMI, initial episode of care [I21.4 (ICD-10-CM)]        Performing Physician  Performing Staff   Primary: Marjorie Phillips MD    Scrub Person: Brynn Fernandez   Invasive Nurse: Jason Marie Jr. RN   Invasive Nurse: Chrissy Calloway RN   Documenter: Christo Mendenhall         Procedures    Coronary angiography   Left ventriculography   Left Heart Cath        Patient Information    Patient Name  Arlene Quesada MRN  [id number] Legal Sex  Female  (Age)  1960 (64 y.o.)     Race Ethnicity Encounter Category   White or  Not  or  Urgent        [facility] PACS Images     Show images for Cardiac Catheterization/Vascular Study         Printable Vessel Diagram    Printable Vessel Diagram       Physicians    Panel Physicians Referring Physician Case Authorizing Physician   Marjorie Phillips MD (Primary) Dasia Glez PA-C Marjorie Phillips MD       Indications    NSTEMI, initial episode of care [I21.4 (ICD-10-CM)]         Pre Procedure Diagnosis    NSTEMI      Post Procedure Diagnosis    Takotsubo cardiomyopathy        Conclusion    CONCLUSION:  1.  No significant atherosclerotic  coronary disease  2.  Takotsubo cardiomyopathy  3.  Elevated left ventricular filling pressures     RECOMMENDATIONS:  Medical management of Takotsubo cardiomyopathy.  Continue metoprolol succinate.  Will try and add an ACE or ARB if blood pressures will tolerate.  Consider diuresis if shows clinical signs of volume overload in light of elevated left ventricular filling pressures.     FINDINGS:  SELECTIVE CORONARY ANGIOGRAMS:  1.  Left main artery: Large-caliber vessel with 0% stenosis.  Vessel bifurcates into left anterior descending and left circumflex arteries.  2.  Left anterior descending artery: Proximally a large caliber vessel with 0% stenosis.  The mid vessel tapers to a moderate caliber vessel.  Gives rise to a moderate caliber first diagonal branch with no significant disease.  The distal LAD tapers to small caliber with no significant stenosis.  3.  Left circumflex artery: Proximally large-caliber vessel with 0% stenosis.  Proximal vessel gives rise to small caliber first obtuse marginal branch with no significant stenosis.  The mid vessel gives rise to a small caliber second obtuse marginal branch with no significant disease.  The distal continuation circumflex vessel tapers to a moderate caliber giving rise to small caliber third and fourth obtuse marginal branches with no second stenosis.  4.  Right coronary artery: Large-caliber vessel with luminal irregularities of the proximal and distal vessel.  The distal vessel bifurcates into a small caliber posterior descending and a moderate caliber posterolateral branch both with no significant disease.  This is a right dominant system.     LEFT VENTRICULAR HEMODYNAMICS:  1.  Left ventricular pressure: 109/18-25  2.  Aortic pressure: 114/75     LEFT VENTRICULOGRAM:  Normal contractility of the left ventricular basal segments and apex.  The remaining left ventricular wall segments are akinetic to dyskinetic.  The pattern of left ventricular wall motion  abnormalities is consistent with Takotsubo cardiomyopathy.  Left ventricular systolic function appears to be severely depressed with a visually estimated ejection fraction of 25 to 30%.     PROCEDURE:  After informed consent was obtained the patient was brought to the cardiac catheterization laboratory where her right wrist was prepped and draped in a sterile manner.  1 mL of 2% lidocaine was infused subcutaneously to the right wrist.  Access the right radial artery is obtained using a micropuncture needle followed by placement of a 5/6 [place] slender glide sheath.     Selective coronary angiograms were performed to the right coronary artery using a 5 [place] Tiger 4.0 diagnostic catheter.  Attempted selectively cannulating the left coronary arteries with multiple catheters including a 5 [place] FL 3.5, 5 [place] FL 4, Tiger 4.0, AL-1, and a 5 [place] ultimate 1 diagnostic catheters.  All of these were unsuccessful.  At this point the patient developed significant spasm and is having difficulty with my catheter exchanges and manipulation.  For this reason I opted to proceed with right femoral artery access in order to complete the diagnostic angiograms.     The right groin was prepped and draped in a sterile manner.  10 mL of 2% lidocaine was infused subcutaneously to the right groin.  Access to the right common femoral artery is obtained using a micropuncture needle and under ultrasound guidance followed with placement of a 4 [place] angiocatheter.  Selective angiograms performed showing access above the bifurcation and below the origin of the inferior epigastric artery.  A 4 [place] angiocatheter was then exchanged for a 4 [place] sheath.     Again with difficulty was able to cannulate the left coronary artery with a 4 [place] FL 4 diagnostic catheter.  Selective coronary angiograms were then performed.  I then proceeded with a left ventriculogram using a 4 [place] pigtail catheter.  A left heart catheterization was  "performed earlier using a 5 Persian FR4 diagnostic catheter.     Following completion of the procedure all wires and catheters removed.  The radial sheath was removed and TR band placed.  The femoral sheath was aspirated and flushed.  The patient was then transferred back to the CVU in stable condition.               Procedure Narrative    Risks, benefits and alternatives were discussed with the patient and/or family. Plan is for moderate sedation. An immediate assessment was done prior to the administration of moderate sedation. Under my direct supervision, intravenous moderate sedation sedation was administered during the course of this procedure with continuous monitoring of hemodynamic parameters and level of consciousness by an independent trained observer. Less than 20 mL of estimated blood loss during the case. No specimen was collected during the case.       Time Under Physician Observation    Name Panel Role Time Period   Marjorie Phillips MD Panel 1 Primary 2024 0957 - 2024 1053      Total Sedation Time    Moderate sedation event time was not documented.          [facility] CARDIOLOGY  [address] [place] [id number]  [phone]            Patient Information    Patient Name  Arlene Quesada MRN  [id number] Legal Sex  Female  (Age)  1960 (64 y.o.)       Complete Transthoracic Echocardiogram with Complete Doppler, Color Flow, Contrast and Myocardial Strain Imaging    Accession Number: [id number]   Date of Study: 24   Ordering Provider: Sania Gallo MD   Clinical Indications: Chest Pain, Evaluation of Ventricular Function after ACS        Reading Physicians  Performing Staff   Cardiology: Sania Gallo MD    Tech: Jeannine Huston            Patient Hx Of Height, Weight, and Vitals    Height Weight BSA (Calculated - sq m) BMI (Calculated) Retired BMI (kg/m2) Pulse BP   160 cm (63\") 60.8 kg (134 lb) 1.63 sq meters 23.7  79 109/75          [facility] PACS " Images     Show images for Adult Transthoracic Echo Complete W/ Cont if Necessary Per Protocol         Clinical Indication    Chest Pain; Evaluation of Ventricular Function after ACS       Interpretation Summary         Left ventricular systolic function is moderately decreased. Calculated left ventricular EF = 33.2%.  Wall motion abnormalities are suspicious for a stress-induced cardiomyopathy.    The following left ventricular wall segments are akinetic: mid anterior, apical anterior, mid anterolateral, apical lateral, mid inferolateral, apical inferior, mid inferior, apical septal, mid inferoseptal, apex and mid anteroseptal.    Left ventricular diastolic function was normal.    Estimated right ventricular systolic pressure from tricuspid regurgitation is normal (<35 mmHg).             Cardiac History    Diagnosis Date Comment Source   Hyperlipemia      Hypertension, essential, benign          Social History      Tobacco Use    Former; Cigarettes: Quit 2002   Smokeless Tobacco: Never used smokeless tobacco.   Tobacco Cessation: Counseling given: Not Answered   Comments: Started at age 19/Stopped at age 45     Vaping Use    Never used         Family Cardiac History as of 4/21/2024    Problem Relation Age of Onset   Diabetes Brother    Hypertension Father          Additional Study Details    Study Quality The study is technically good for diagnosis.   Enhancement Agent Details Verbal consent was obtained from the patient to use Definity image enhancer in order to optimize the study. The use of Definity was indicated as two or more segments of the left ventricular endocardial border were unable to be adequately visualized by standard imaging methods. 1.3 mL of mechanically activated Definity was mixed with 8.7 mL of preservative free saline.  A total of 3 mL of the resulting Definity solution was administered, and the remaining image enhancer was wasted and discarded.   No adverse reaction to image enhancer was  noted.       Echocardiogram Findings    Left Ventricle Left ventricular systolic function is moderately decreased. Calculated left ventricular EF = 33.2%   Abnormal global longitudinal LV strain (GLS) = -8.7%. Left ventricle strain data was reviewed by the physician and found to be accurate. Normal left ventricular cavity size and wall thickness noted. Left ventricular diastolic function was normal.   Right Ventricle Normal right ventricular cavity size and systolic function noted.   Left Atrium Normal left atrial cavity size noted.   Right Atrium Normal right atrial cavity size noted.   Aortic Valve The aortic valve is structurally normal with no regurgitation or stenosis present.   Mitral Valve The mitral valve is structurally normal with no significant stenosis present. Mild mitral valve regurgitation is present.   Tricuspid Valve The tricuspid valve is structurally normal with no significant stenosis present. Mild tricuspid valve regurgitation is present. Estimated right ventricular systolic pressure from tricuspid regurgitation is normal (<35 mmHg).   Pulmonic Valve The pulmonic valve is grossly normal in structure.   Greater Vessels No dilation of the sinuses of Valsalva is present.   Pericardium There is no evidence of pericardial effusion. .          Wall Scoring    Score Index: 2.29              The following segments are akinetic: mid anterior, mid anteroseptal, mid inferoseptal, mid inferior, mid inferolateral, mid anterolateral, apical anterior, apical septal, apical inferior, apical lateral and apex.  All other segments are normal.                   LV Measurements    Dimensions   LVIDd 4.9 cm         IVSd 1.08 cm         FS 24.8 %         ESV(cubed) 50.1 ml         EDV(cubed) 118 ml         LVOT area 2.5 cm2         LVOT diam 1.8 cm         EDV(MOD-sp2) 107 ml         ESV(MOD-sp2) 80 ml         Diastolic Filling   MV E max ismael 62.4 cm/sec         MV A max ismael 89.1 cm/sec         MV dec time 0.12 sec          MV E/A 0.7         LA ESV Index (BP) 28.8 ml/m2         Med Peak E' Scooby 6.9 cm/sec         Lat Peak E' Scooby 5.4 cm/sec         Avg E/e' ratio 10.15         Shunt Ratio   SV(LVOT) 32.3 ml         SV(RVOT) 24.1 ml         Qp/Qs 0.75          Dimensions   LVIDs 3.7 cm         LVPWd 0.99 cm         IVS/LVPW 1.09 cm         LV Sys Vol (BSA corrected) 43.5 cm2         LV Carlson Vol (BSA corrected) 67.4 cm2         LV mass(C)d 184.9 grams         EDV(MOD-sp4) 110 ml         ESV(MOD-sp4) 71 ml         Systolic Function   SV(MOD-sp2) 27 ml         SV(MOD-sp4) 39 ml         SVi(MOD-SP2) 16.6 ml/m2         SVi(MOD-SP4) 23.9 ml/m2         EF(MOD-sp2) 25.2 %         EF(MOD-sp4) 35.5 %         EF(MOD-bp) 33.2 %                Right Ventricle Measurements    RV Base 2.3 cm         RV Mid 1.67 cm         RV Length 4.6 cm          TAPSE (>1.6) 2.2 cm         RV S' 9.5 cm/sec                LA Measurements    LA Dimensions   LA ESV Index (BP) 28.8 ml/m2                 Aortic Valve Measurements    Stenosis   LVOT diam 1.8 cm         LV V1 max 74.4 cm/sec         LV V1 max PG 2.22 mmHg         LV V1 mean PG 1.01 mmHg         LV V1 VTI 12.7 cm         Ao pk scooby 112.3 cm/sec          Stenosis   Ao max PG 5 mmHg         Ao mean PG 2.9 mmHg         Ao V2 VTI 21 cm         ALEUXS(I,D) 1.54 cm2                Mitral Valve Measurements    Stenosis   MV max PG 4.1 mmHg         MV mean PG 2 mmHg         MV V2 VTI 19.2 cm         MV P1/2t 47.8 msec         MVA(P1/2t) 4.6 cm2         MVA(VTI) 1.68 cm2         MV dec slope 454 cm/sec2         MV dec time 0.12 sec          Regurgitation   MR max scooby 470.6 cm/sec         MR max PG 88.6 mmHg                Tricuspid Valve Measurements    Regurgitation   TR max scooby 263.8 cm/sec         TR max PG 27.8 mmHg         RVSP(TR) 30 mmHg         RAP systole 5 mmHg         PISA   TR max scooby 263.8 cm/sec         TR max PG 27.8 mmHg                 Pulmonic Valve Measurements    Stenosis   RVOT diam 1.48 cm          RV V1 max PG 1.8 mmHg         RV V1 max 67.1 cm/sec         RV V1 VTI 14 cm         PA V2 max 68.8 cm/sec         PA acc time 0.16 sec                 Greater Vessels Measurements    Abdo Ao Diam 1.9 cm         ACS 1.36 cm         Sinus 2.5 cm         STJ 2 cm         Ascending aorta 2.8 cm                    Signed    Electronically signed by Sania Gallo MD on 24 at 1048 EDT       Reading Providers     Reading Role Read Date   Sania Gallo MD Cardiology 2024       Adult Transthoracic Echo Complete W/ Cont if Necessary Per Protocol  Order: [id number]  Status: Final result      Result Care Coordination      Patient Communication     Released  Not seen           Printable Result Report    Result Report      Encounter    View Encounter               Results Routing Tracking    View Results Routing Information     Order Report     Order Details                     Consult Notes (all)        Zach Siegel at 24 1010        Consult Orders    1. Inpatient Consult to Advance Care Planning [[id number]] ordered by Marjorie Phillips MD at 24 0645                 Met with Pt to go over an Advance Directive. Went through the form, filled it out, and answered Pt questions. The form was completed and notarized. Pt got original copy, copy was placed in chart, and a copy was faxed to HIM.     Electronically signed by Zach Siegel at 24 1038          Discharge Summary        Carmen Shipley APRN at 24 1408       Attestation signed by Marjorie Phillips MD at 24 1515    I have reviewed this documentation and agree.                  Patient Name: Arlene Quesada  :1960  64 y.o.    Date of Admit: 2024  Date of Discharge:  2024    Discharge Diagnosis:  Problems Addressed this Visit       NSTEMI, initial episode of care    Relevant Medications    metoprolol succinate XL (TOPROL-XL) 25 MG 24 hr tablet    Other Relevant Orders    Case Request Cath Lab: Left Heart Cath,  Coronary angiography (Completed)    Cardiac Catheterization/Vascular Study (Completed)    Ambulatory Referral to Cardiac Rehab     Other Visit Diagnoses       Chest pain at rest    -  Primary          Diagnoses         Codes Comments    Chest pain at rest    -  Primary ICD-10-CM: R07.9  ICD-9-CM: 786.50     NSTEMI, initial episode of care     ICD-10-CM: I21.4  ICD-9-CM: 410.71             Hospital Course:   This is a 63-year-old female with a past medical history of anxiety, depression, GERD, hypothyroidism, arthritis, hypertension, hyperlipidemia.  She presented to the emergency room on 4/21/2024 with complaints of sudden onset substernal chest discomfort that radiated to the left side of her chest and was associated with left sided arm numbness and shortness of breath.  She reported that the discomfort was similar to what she typically experiences with GERD.  Usually she will take a PPI in times which will relieve her discomfort.  However with this particular episode the discomfort was much more severe and radiated to the left chest.  She tried taking Tums without any relief.  She also reported that she was belching a lot and had left arm numbness and shortness of breath.    In the emergency room at [facility] her EKG was unremarkable and her vital signs were within normal limits.  Her initial troponin was elevated at 207 with a repeat of 411.  She was transferred to [facility] for further evaluation.    A cardiac catheterization was performed that showed no significant atherosclerotic coronary disease.  There is normal contractility of the left ventricular basal segments and apex.  The remaining left ventricular wall segments were akinetic to dyskinetic.  The pattern of the left ventricular wall motion abnormalities was consistent with Takotsubo cardiomyopathy.  Left ventricular systolic function appeared to be severely depressed with an EF of 25 to 30%.An echocardiogram was also obtained that  "showed moderately decreased LV systolic function with an EF of 33.2% and wall motion abnormalities suspicious for stress-induced cardiomyopathy.  She was started on guideline directed medical therapy with Toprol-XL.  She was previously on benazepril and amlodipine.  Amlodipine was stopped in light of her reduced heart function and benazepril was placed on hold for relatively low blood pressures.    She is stable for discharge home today.  She denies any chest discomforts or shortness of breath.  She has ambulated without any difficulties.  Her right groin site is soft with no palpable hematoma.  She will remain on Toprol-XL but we will hold off on initiating an ACE ARB or Entresto for the time being given her lower blood pressure readings.  This will be addressed at her follow-up with me in 1 week.  We will plan to repeat an echocardiogram in about 2 to 3 months to reassess her cardiomyopathy.  He does plan to attend cardiac rehab as well.        Procedures Performed  Procedure(s):  Left Heart Cath  Coronary angiography  Left ventriculography       Consults       No orders found from 3/22/2024 to 4/21/2024.            Pertinent Test Results:         Invalid input(s): \"LABALBU\", \"PROT\"          @LABRCNT(bnp)@                      Assessment/Plan;  1.  Non-STEMI.  No EKG changes.  Normal coronaries. Wall motion abnormalities consistent with Takotsubo cardiomyopathy.  2. Takotsubo CM: GDMT with Toprol XL. BP too soft for ACE/ARB/ARNI or spironolactone. Will reassess at follow up and optimize GDMT as tolerated.  3.  Hypertension  4  Hyperlipidemia.  5.  Diabetes mellitus type 2  6.  Hypothyroidism  7.  Depression/anxiety.     Condition on Discharge: stable    Discharge Medications     Discharge Medications        New Medications        Instructions Start Date   metoprolol succinate XL 25 MG 24 hr tablet  Commonly known as: TOPROL-XL   25 mg, Oral, Every 24 Hours Scheduled             Continue These Medications        " Instructions Start Date   albuterol sulfate  (90 Base) MCG/ACT inhaler  Commonly known as: PROVENTIL HFA;VENTOLIN HFA;PROAIR HFA   2 puffs, Inhalation, Every 4 Hours PRN      bimatoprost 0.01 % ophthalmic drops  Commonly known as: LUMIGAN   1 drop, Nightly, Instill 1 in affected eye once a day (at bedtime)       buPROPion  MG 24 hr tablet  Commonly known as: WELLBUTRIN XL   300 mg, Oral, Every Morning, For mood      esomeprazole 40 MG capsule  Commonly known as: nexIUM   40 mg, Oral, Every Morning Before Breakfast      First-Testosterone 2 % ointment   Transdermal      FLUoxetine 40 MG capsule  Commonly known as: PROzac   80 mg, Oral, Daily, For depression and anxiety      Insulin Pen Needle 32G X 4 MM misc   For once daily use with daily injection      LORazepam 0.5 MG tablet  Commonly known as: ATIVAN   No dose, route, or frequency recorded.      metaxalone 800 MG tablet  Commonly known as: SKELAXIN   800 mg, Oral, 2 Times Daily PRN      montelukast 10 MG tablet  Commonly known as: SINGULAIR   10 mg, Oral, Nightly, For allergies      mupirocin 2 % ointment  Commonly known as: Bactroban   Topical, 3 Times Daily, To wound area until healed      NP Thyroid 90 MG tablet  Generic drug: Thyroid       terconazole 0.8 % vaginal cream  Commonly known as: TERAZOL 3   1 applicator, Vaginal, Nightly, X 3 days for yeast      timolol 0.5 % ophthalmic solution  Commonly known as: TIMOPTIC       tobramycin-dexAMETHasone 0.3-0.1 % ophthalmic suspension  Commonly known as: TOBRADEX   No dose, route, or frequency recorded.      valACYclovir 1000 MG tablet  Commonly known as: VALTREX   TAKE 2 TABLETS BY MOUTH AT ONSET FOR FEVER BLISTER. REPEAT THIS DOSE 1 TIME IN 12 HOURS      Vitamin D3 50 MCG (2000 UT) tablet   Oral, Take 1 capsule by oral route daily for 90 days       Wegovy 0.25 MG/0.5ML solution auto-injector  Generic drug: Semaglutide-Weight Management   0.25 mg, Subcutaneous, Weekly, Inject 0.25 mg SQ weekly x4       Xiidra 5 % ophthalmic solution  Generic drug: Lifitegrast   No dose, route, or frequency recorded.             Stop These Medications      amLODIPine 2.5 MG tablet  Commonly known as: NORVASC     benazepril 10 MG tablet  Commonly known as: LOTENSIN     nitrofurantoin (macrocrystal-monohydrate) 100 MG capsule  Commonly known as: MACROBID              Discharge Diet: cardiac diet    Activity at Discharge: as tolerated    Discharge disposition: home    Follow-up Appointments  Future Appointments   Date Time Provider Department Center   5/7/2024  1:45 PM Dasia Glez, [id number]   5/28/2024  8:20 AM LAB CHAIR 1 [facility]   5/28/2024  8:40 AM Maycol Haynes MD [facility]   6/6/2024 10:45 AM Marjorie Phillips MD [facility] None   8/14/2024  8:00 AM Dasia Glez PA-C [facility]     Additional Instructions for the Follow-ups that You Need to Schedule       Ambulatory Referral to Cardiac Rehab   As directed              Test Results Pending at Discharge       DAREK Atkinson, [facility]  04/29/24  14:37 EDT    Time: Discharge 35 min  Electronically signed by DAREK Atkinson, 04/23/24, 9:29 AM EDT.      Electronically signed by Marjorie Phillips MD at 04/29/24 8993

## 2024-05-07 ENCOUNTER — OFFICE VISIT (OUTPATIENT)
Dept: FAMILY MEDICINE CLINIC | Facility: CLINIC | Age: 64
End: 2024-05-07
Payer: COMMERCIAL

## 2024-05-07 VITALS
OXYGEN SATURATION: 98 % | DIASTOLIC BLOOD PRESSURE: 62 MMHG | RESPIRATION RATE: 16 BRPM | HEART RATE: 74 BPM | TEMPERATURE: 97.5 F | HEIGHT: 63 IN | BODY MASS INDEX: 24.45 KG/M2 | WEIGHT: 138 LBS | SYSTOLIC BLOOD PRESSURE: 116 MMHG

## 2024-05-07 DIAGNOSIS — Z09 HOSPITAL DISCHARGE FOLLOW-UP: Primary | ICD-10-CM

## 2024-05-07 DIAGNOSIS — I51.81 TAKOTSUBO CARDIOMYOPATHY: ICD-10-CM

## 2024-05-07 DIAGNOSIS — I10 PRIMARY HYPERTENSION: ICD-10-CM

## 2024-05-07 PROCEDURE — 99214 OFFICE O/P EST MOD 30 MIN: CPT | Performed by: PHYSICIAN ASSISTANT

## 2024-05-07 RX ORDER — METHYLPREDNISOLONE 4 MG/1
TABLET ORAL
COMMUNITY
Start: 2024-05-01

## 2024-06-05 ENCOUNTER — TRANSCRIBE ORDERS (OUTPATIENT)
Dept: LAB | Facility: HOSPITAL | Age: 64
End: 2024-06-05
Payer: COMMERCIAL

## 2024-06-05 ENCOUNTER — LAB (OUTPATIENT)
Dept: LAB | Facility: HOSPITAL | Age: 64
End: 2024-06-05
Payer: COMMERCIAL

## 2024-06-05 DIAGNOSIS — E89.0 POSTSURGICAL HYPOTHYROIDISM: Primary | ICD-10-CM

## 2024-06-05 DIAGNOSIS — E89.0 POSTSURGICAL HYPOTHYROIDISM: ICD-10-CM

## 2024-06-05 LAB
T3FREE SERPL-MCNC: 3.42 PG/ML (ref 2–4.4)
T4 FREE SERPL-MCNC: 0.86 NG/DL (ref 0.92–1.68)
TSH SERPL DL<=0.05 MIU/L-ACNC: 0.37 UIU/ML (ref 0.27–4.2)

## 2024-06-05 PROCEDURE — 84443 ASSAY THYROID STIM HORMONE: CPT

## 2024-06-05 PROCEDURE — 84481 FREE ASSAY (FT-3): CPT

## 2024-06-05 PROCEDURE — 36415 COLL VENOUS BLD VENIPUNCTURE: CPT

## 2024-06-05 PROCEDURE — 84439 ASSAY OF FREE THYROXINE: CPT

## 2024-06-05 NOTE — PROGRESS NOTES
Subjective:     Encounter Date:06/06/2024      Patient ID: Arlene Quesada is a 64 y.o. female.    Chief Complaint:  History of Present Illness    This is a 64-year-old with anxiety, depression, GERD, hypothyroidism, arthritis, hypertension, hyperlipidemia, non-STEMI, Takotsubo cardiomyopathy, who presents for follow-up.    I saw the patient initially when she presented to the hospital with complaints of chest pain.  The patient was folding laundry when she developed sudden onset of substernal chest discomfort radiating to her left chest and associate with left arm numbness and shortness of breath.  She tried taking medications for acid reflux without any relief.  Following arrival to the emergency room she was noted to have an elevated troponin of 207 which further aparna to 411.  She was treated with nitroglycerin paste and given a dose of full dose enoxaparin.  Based on her presentation she underwent an echocardiogram on 4/21/2024 which showed moderately depressed left ventricular function with an EF of 33%, wall motion abnormalities concerning for stress-induced cardiomyopathy, normal diastolic function, no severe valvular disease.  Cardiac catheterization showed no evidence of significant atherosclerotic coronary artery disease, elevated left ventricular filling pressures with an LVEDP of 18 to 25 mmHg, and left ventriculogram with findings consistent with Takotsubo cardiomyopathy.    The patient was observed overnight.  She started on guideline directed management with metoprolol succinate.  Her home benazepril and amlodipine were stopped due to relatively low blood pressures.    She was seen in follow-up by DAREK Zayas on 4/29/2024.  She was doing well.  Her blood pressures were still relatively low.  No changes were made to her management.    She presents today for routine follow-up.  Overall she has been feeling well.  She denies any chest pain, shortness of breath out of the ordinary, palpitations,  orthopnea, near-syncope or syncope or lower extremity swelling.  Blood pressures that have been doing fairly well.    Review of Systems   Constitutional: Negative for malaise/fatigue.   HENT:  Negative for hearing loss, hoarse voice, nosebleeds and sore throat.    Eyes:  Negative for pain.   Cardiovascular:  Positive for dyspnea on exertion. Negative for chest pain, claudication, cyanosis, irregular heartbeat, leg swelling, near-syncope, orthopnea, palpitations, paroxysmal nocturnal dyspnea and syncope.   Respiratory:  Negative for shortness of breath and snoring.    Endocrine: Negative for cold intolerance, heat intolerance, polydipsia, polyphagia and polyuria.   Skin:  Negative for itching and rash.   Musculoskeletal:  Negative for arthritis, falls, joint pain, joint swelling, muscle cramps, muscle weakness and myalgias.   Gastrointestinal:  Negative for constipation, diarrhea, dysphagia, heartburn, hematemesis, hematochezia, melena, nausea and vomiting.   Genitourinary:  Negative for frequency, hematuria and hesitancy.   Neurological:  Negative for excessive daytime sleepiness, dizziness, headaches, light-headedness, numbness and weakness.   Psychiatric/Behavioral:  Negative for depression. The patient is not nervous/anxious.          Current Outpatient Medications:     albuterol sulfate  (90 Base) MCG/ACT inhaler, Inhale 2 puffs Every 4 (Four) Hours As Needed., Disp: , Rfl:     buPROPion XL (WELLBUTRIN XL) 300 MG 24 hr tablet, Take 1 tablet by mouth Every Morning. For mood, Disp: 90 tablet, Rfl: 3    Cholecalciferol (VITAMIN D3) 2000 UNITS tablet, Take by mouth. Take 1 capsule by oral route daily for 90 days, Disp: , Rfl:     esomeprazole (nexIUM) 40 MG capsule, Take 1 capsule by mouth Every Morning Before Breakfast., Disp: , Rfl:     FLUoxetine (PROzac) 40 MG capsule, Take 2 capsules by mouth Daily. For depression and anxiety, Disp: 180 capsule, Rfl: 3    Insulin Pen Needle 32G X 4 MM misc, For once  daily use with daily injection, Disp: 100 each, Rfl: 3    LORazepam (ATIVAN) 0.5 MG tablet, , Disp: , Rfl:     metaxalone (SKELAXIN) 800 MG tablet, Take 1 tablet by mouth 2 (Two) Times a Day As Needed for Muscle Spasms., Disp: 60 tablet, Rfl: 1    metFORMIN ER (GLUCOPHAGE-XR) 500 MG 24 hr tablet, TAKE 1 TABLET DAILY, Disp: 90 tablet, Rfl: 0    methylPREDNISolone (MEDROL) 4 MG dose pack, TAKE BY MOUTH AS DIRECTED IN PACKAGE, Disp: , Rfl:     metoprolol succinate XL (TOPROL-XL) 25 MG 24 hr tablet, Take 1 tablet by mouth Daily., Disp: 90 tablet, Rfl: 3    montelukast (SINGULAIR) 10 MG tablet, Take 1 tablet by mouth Every Night. For allergies, Disp: 90 tablet, Rfl: 3    mupirocin (Bactroban) 2 % ointment, Apply  topically to the appropriate area as directed 3 (Three) Times a Day. To wound area until healed, Disp: 30 each, Rfl: 1    NP Thyroid 60 MG tablet, Take 1 tablet by mouth., Disp: , Rfl:     NP Thyroid 90 MG tablet, , Disp: , Rfl:     Rocklatan 0.02-0.005 % solution, , Disp: , Rfl:     Semaglutide-Weight Management (Wegovy) 0.25 MG/0.5ML solution auto-injector, Inject 0.25 mg under the skin into the appropriate area as directed 1 (One) Time Per Week. Inject 0.25 mg SQ weekly x4, Disp: 2 mL, Rfl: 0    terconazole (TERAZOL 3) 0.8 % vaginal cream, Insert 1 applicator into the vagina Every Night. X 3 days for yeast, Disp: 20 g, Rfl: 5    Testosterone Propionate (FIRST-TESTOSTERONE) 2 % ointment, Place  on the skin as directed by provider., Disp: , Rfl:     timolol (TIMOPTIC) 0.5 % ophthalmic solution, , Disp: , Rfl:     valACYclovir (VALTREX) 1000 MG tablet, TAKE 2 TABLETS BY MOUTH AT ONSET FOR FEVER BLISTER. REPEAT THIS DOSE 1 TIME IN 12 HOURS, Disp: 20 tablet, Rfl: 5    XIIDRA 5 % solution, , Disp: , Rfl:     bimatoprost (LUMIGAN) 0.01 % ophthalmic drops, 1 drop Every Night. Instill 1 in affected eye once a day (at bedtime) (Patient not taking: Reported on 5/7/2024), Disp: , Rfl:     Past Medical History:    Diagnosis Date    Anemia, iron deficiency     Arthritis     Depression     Gastroesophageal reflux     Glaucoma     History of complete eye exam 52460    [facility]: early glaucoma    History of EKG 03/2006    borderline QT prolonged, NSR    History of MRI of cervical spine 03/29/2011    Multilevel DDD with multilevel spinal steonsis, most severe at C5-6 and C6-7, less prominent at C3-4 and C4-5, there is neuroforaminimal compromise on the right at multiple levels    History of MRI of lumbar spine 03/29/2011    Multilevel DDD most prominent at L4-5 and L5-S1, moderate facet DDD, protrusion in L4-5 and mild disc bulge at L5-S1    Hyperlipemia     Hypertension, essential, benign     Low back pain     Nausea 01/23/2015    Reflux esophagitis     Thyroid disorder     Thyroid nodule        Past Surgical History:   Procedure Laterality Date    APPENDECTOMY      BREAST BIOPSY  09/26/2013    left - benign fibroadenoma    CARDIAC CATHETERIZATION N/A 4/21/2024    Procedure: Left Heart Cath;  Surgeon: Marjorie Phillips MD;  Location: [facility] CATH INVASIVE LOCATION;  Service: Cardiology;  Laterality: N/A;    CARDIAC CATHETERIZATION N/A 4/21/2024    Procedure: Coronary angiography;  Surgeon: Marjorie Phillips MD;  Location:  [facility] CATH INVASIVE LOCATION;  Service: Cardiology;  Laterality: N/A;    CARDIAC CATHETERIZATION N/A 4/21/2024    Procedure: Left ventriculography;  Surgeon: Marjorie Phillips MD;  Location:  [facility] CATH INVASIVE LOCATION;  Service: Cardiology;  Laterality: N/A;    CHOLECYSTECTOMY      COLONOSCOPY      COLPOSCOPY      ENDOMETRIAL ABLATION      ENDOSCOPY  03/2006    Dr. Ruiz: clean based gastric ulcer    ENDOSCOPY N/A 12/13/2023    Procedure: ESOPHAGOGASTRODUODENOSCOPY with cold biopsies;  Surgeon: Silver Rose MD;  Location: [facility] ENDOSCOPY;  Service: Gastroenterology;  Laterality: N/A;  Pre: dysphagia  Post: normal    HYSTERECTOMY  10/2010    THYROIDECTOMY, PARTIAL Right     TRABECULECTOMY  "Bilateral     UPPER GASTROINTESTINAL ENDOSCOPY         Family History   Problem Relation Age of Onset    Breast cancer Mother     Kidney cancer Mother     Hypertension Father     Diabetes Brother        Social History     Tobacco Use    Smoking status: Former     Current packs/day: 0.00     Types: Cigarettes     Quit date:      Years since quittin.4    Smokeless tobacco: Never    Tobacco comments:     Started at age 19/Stopped at age 45   Vaping Use    Vaping status: Never Used   Substance Use Topics    Alcohol use: Yes     Comment: rare    Drug use: No         ECG 12 Lead    Date/Time: 2024 12:37 PM  Performed by: Marjorie Phillips MD    Authorized by: Marjorie Phillips MD  Comparison: compared with previous ECG   Similar to previous ECG  Rhythm: sinus rhythm             Objective:     Visit Vitals  /80 (BP Location: Left arm, Patient Position: Sitting, Cuff Size: Adult)   Pulse 71   Ht 160 cm (63\")   Wt 61.9 kg (136 lb 6.4 oz)   LMP  (LMP Unknown)   SpO2 96%   BMI 24.16 kg/m²         Constitutional:       Appearance: Normal appearance. Well-developed.   Eyes:      General: Lids are normal.      Conjunctiva/sclera: Conjunctivae normal.      Pupils: Pupils are equal, round, and reactive to light.   HENT:      Head: Normocephalic and atraumatic.   Neck:      Vascular: No carotid bruit or JVD.      Lymphadenopathy: No cervical adenopathy.   Pulmonary:      Effort: Pulmonary effort is normal.      Breath sounds: Normal breath sounds.   Cardiovascular:      Normal rate. Regular rhythm.      No gallop.    Pulses:     Radial: 2+ bilaterally.  Edema:     Peripheral edema absent.   Abdominal:      Palpations: Abdomen is soft.   Musculoskeletal:      Cervical back: Full passive range of motion without pain, normal range of motion and neck supple. Skin:     General: Skin is warm and dry.   Neurological:      Mental Status: Alert and oriented to person, place, and time.             Assessment:          " Diagnosis Plan   1. NICM (nonischemic cardiomyopathy)  Ambulatory Referral to Cardiac Rehab    Adult Transthoracic Echo Limited W/ Cont if Necessary Per Protocol      2. Hypertension, essential, benign        3. Mixed hyperlipidemia        4. NSTEMI (non-ST elevated myocardial infarction)  Ambulatory Referral to Cardiac Rehab      5. Takotsubo cardiomyopathy               Plan:       1.  Takotsubo cardiomyopathy.  EF of 33 % on metoprolol succinate.  Blood pressures are too low for any other guideline directed management.    Suspect her LV function has recovered.  Will plan on a repeat echocardiogram.  2.  Hypertension.  Well-controlled on current regimen medications.  Continue the same.  3.  Hypothyroidism  4.  Status post non-STEMI.  Due to #1.  Patient is interested in proceeding with cardiac rehab.  Her prior insurance would not cover this.  She reports that her current insurance will cover cardiac rehab.  Another referral will be made.  4.  Diabetes mellitus type 2    I will call and discuss results of her echocardiogram.  Will tentatively plan on seeing the patient back again in 3 months.

## 2024-06-06 ENCOUNTER — OFFICE VISIT (OUTPATIENT)
Age: 64
End: 2024-06-06
Payer: COMMERCIAL

## 2024-06-06 VITALS
OXYGEN SATURATION: 96 % | HEIGHT: 63 IN | BODY MASS INDEX: 24.17 KG/M2 | DIASTOLIC BLOOD PRESSURE: 80 MMHG | WEIGHT: 136.4 LBS | SYSTOLIC BLOOD PRESSURE: 119 MMHG | HEART RATE: 71 BPM

## 2024-06-06 DIAGNOSIS — I21.4 NSTEMI (NON-ST ELEVATED MYOCARDIAL INFARCTION): ICD-10-CM

## 2024-06-06 DIAGNOSIS — I42.8 NICM (NONISCHEMIC CARDIOMYOPATHY): Primary | ICD-10-CM

## 2024-06-06 DIAGNOSIS — I51.81 TAKOTSUBO CARDIOMYOPATHY: ICD-10-CM

## 2024-06-06 DIAGNOSIS — E78.2 MIXED HYPERLIPIDEMIA: ICD-10-CM

## 2024-06-06 DIAGNOSIS — I10 HYPERTENSION, ESSENTIAL, BENIGN: ICD-10-CM

## 2024-06-06 NOTE — LETTER
June 6, 2024       No Recipients    Patient: Arlene Quesada   YOB: 1960   Date of Visit: 6/6/2024       Dear Dasia Glez PA-C    Arlene Quesada was in my office today. Below is a copy of my note.    If you have questions, please do not hesitate to call me. I look forward to following Arlene along with you.         Sincerely,        Marjorie Phillips MD        CC:   No Recipients        Subjective:     Encounter Date:06/06/2024      Patient ID: Arlene Quesada is a 64 y.o. female.    Chief Complaint:  History of Present Illness    This is a 64-year-old with anxiety, depression, GERD, hypothyroidism, arthritis, hypertension, hyperlipidemia, non-STEMI, Takotsubo cardiomyopathy, who presents for follow-up.    I saw the patient initially when she presented to the hospital with complaints of chest pain.  The patient was folding laundry when she developed sudden onset of substernal chest discomfort radiating to her left chest and associate with left arm numbness and shortness of breath.  She tried taking medications for acid reflux without any relief.  Following arrival to the emergency room she was noted to have an elevated troponin of 207 which further aparna to 411.  She was treated with nitroglycerin paste and given a dose of full dose enoxaparin.  Based on her presentation she underwent an echocardiogram on 4/21/2024 which showed moderately depressed left ventricular function with an EF of 33%, wall motion abnormalities concerning for stress-induced cardiomyopathy, normal diastolic function, no severe valvular disease.  Cardiac catheterization showed no evidence of significant atherosclerotic coronary artery disease, elevated left ventricular filling pressures with an LVEDP of 18 to 25 mmHg, and left ventriculogram with findings consistent with Takotsubo cardiomyopathy.    The patient was observed overnight.  She started on guideline directed management with metoprolol succinate.  Her home benazepril and  amlodipine were stopped due to relatively low blood pressures.    She was seen in follow-up by DAREK Zayas on 4/29/2024.  She was doing well.  Her blood pressures were still relatively low.  No changes were made to her management.    ROS      Current Outpatient Medications:   •  albuterol sulfate  (90 Base) MCG/ACT inhaler, Inhale 2 puffs Every 4 (Four) Hours As Needed., Disp: , Rfl:   •  buPROPion XL (WELLBUTRIN XL) 300 MG 24 hr tablet, Take 1 tablet by mouth Every Morning. For mood, Disp: 90 tablet, Rfl: 3  •  Cholecalciferol (VITAMIN D3) 2000 UNITS tablet, Take by mouth. Take 1 capsule by oral route daily for 90 days, Disp: , Rfl:   •  esomeprazole (nexIUM) 40 MG capsule, Take 1 capsule by mouth Every Morning Before Breakfast., Disp: , Rfl:   •  FLUoxetine (PROzac) 40 MG capsule, Take 2 capsules by mouth Daily. For depression and anxiety, Disp: 180 capsule, Rfl: 3  •  Insulin Pen Needle 32G X 4 MM misc, For once daily use with daily injection, Disp: 100 each, Rfl: 3  •  LORazepam (ATIVAN) 0.5 MG tablet, , Disp: , Rfl:   •  metaxalone (SKELAXIN) 800 MG tablet, Take 1 tablet by mouth 2 (Two) Times a Day As Needed for Muscle Spasms., Disp: 60 tablet, Rfl: 1  •  metFORMIN ER (GLUCOPHAGE-XR) 500 MG 24 hr tablet, TAKE 1 TABLET DAILY, Disp: 90 tablet, Rfl: 0  •  methylPREDNISolone (MEDROL) 4 MG dose pack, TAKE BY MOUTH AS DIRECTED IN PACKAGE, Disp: , Rfl:   •  metoprolol succinate XL (TOPROL-XL) 25 MG 24 hr tablet, Take 1 tablet by mouth Daily., Disp: 90 tablet, Rfl: 3  •  montelukast (SINGULAIR) 10 MG tablet, Take 1 tablet by mouth Every Night. For allergies, Disp: 90 tablet, Rfl: 3  •  mupirocin (Bactroban) 2 % ointment, Apply  topically to the appropriate area as directed 3 (Three) Times a Day. To wound area until healed, Disp: 30 each, Rfl: 1  •  NP Thyroid 60 MG tablet, Take 1 tablet by mouth., Disp: , Rfl:   •  NP Thyroid 90 MG tablet, , Disp: , Rfl:   •  Rocklatan 0.02-0.005 % solution, , Disp: ,  Rfl:   •  Semaglutide-Weight Management (Wegovy) 0.25 MG/0.5ML solution auto-injector, Inject 0.25 mg under the skin into the appropriate area as directed 1 (One) Time Per Week. Inject 0.25 mg SQ weekly x4, Disp: 2 mL, Rfl: 0  •  terconazole (TERAZOL 3) 0.8 % vaginal cream, Insert 1 applicator into the vagina Every Night. X 3 days for yeast, Disp: 20 g, Rfl: 5  •  Testosterone Propionate (FIRST-TESTOSTERONE) 2 % ointment, Place  on the skin as directed by provider., Disp: , Rfl:   •  timolol (TIMOPTIC) 0.5 % ophthalmic solution, , Disp: , Rfl:   •  valACYclovir (VALTREX) 1000 MG tablet, TAKE 2 TABLETS BY MOUTH AT ONSET FOR FEVER BLISTER. REPEAT THIS DOSE 1 TIME IN 12 HOURS, Disp: 20 tablet, Rfl: 5  •  XIIDRA 5 % solution, , Disp: , Rfl:   •  bimatoprost (LUMIGAN) 0.01 % ophthalmic drops, 1 drop Every Night. Instill 1 in affected eye once a day (at bedtime) (Patient not taking: Reported on 5/7/2024), Disp: , Rfl:     Past Medical History:   Diagnosis Date   • Anemia, iron deficiency    • Arthritis    • Depression    • Gastroesophageal reflux    • Glaucoma    • History of complete eye exam 39232    [facility]: early glaucoma   • History of EKG 03/2006    borderline QT prolonged, NSR   • History of MRI of cervical spine 03/29/2011    Multilevel DDD with multilevel spinal steonsis, most severe at C5-6 and C6-7, less prominent at C3-4 and C4-5, there is neuroforaminimal compromise on the right at multiple levels   • History of MRI of lumbar spine 03/29/2011    Multilevel DDD most prominent at L4-5 and L5-S1, moderate facet DDD, protrusion in L4-5 and mild disc bulge at L5-S1   • Hyperlipemia    • Hypertension, essential, benign    • Low back pain    • Nausea 01/23/2015   • Reflux esophagitis    • Thyroid disorder    • Thyroid nodule        Past Surgical History:   Procedure Laterality Date   • APPENDECTOMY     • BREAST BIOPSY  09/26/2013    left - benign fibroadenoma   • CARDIAC CATHETERIZATION N/A 4/21/2024     "Procedure: Left Heart Cath;  Surgeon: Marjorie Phillips MD;  Location:  [facility] CATH INVASIVE LOCATION;  Service: Cardiology;  Laterality: N/A;   • CARDIAC CATHETERIZATION N/A 2024    Procedure: Coronary angiography;  Surgeon: Marjorie Phillips MD;  Location:  [facility] CATH INVASIVE LOCATION;  Service: Cardiology;  Laterality: N/A;   • CARDIAC CATHETERIZATION N/A 2024    Procedure: Left ventriculography;  Surgeon: Marjorie Phillips MD;  Location:  [facility] CATH INVASIVE LOCATION;  Service: Cardiology;  Laterality: N/A;   • CHOLECYSTECTOMY     • COLONOSCOPY     • COLPOSCOPY     • ENDOMETRIAL ABLATION     • ENDOSCOPY  2006    Dr. Ruiz: clean based gastric ulcer   • ENDOSCOPY N/A 2023    Procedure: ESOPHAGOGASTRODUODENOSCOPY with cold biopsies;  Surgeon: Silver Rose MD;  Location:  [facility] ENDOSCOPY;  Service: Gastroenterology;  Laterality: N/A;  Pre: dysphagia  Post: normal   • HYSTERECTOMY  10/2010   • THYROIDECTOMY, PARTIAL Right    • TRABECULECTOMY Bilateral    • UPPER GASTROINTESTINAL ENDOSCOPY         Family History   Problem Relation Age of Onset   • Breast cancer Mother    • Kidney cancer Mother    • Hypertension Father    • Diabetes Brother        Social History     Tobacco Use   • Smoking status: Former     Current packs/day: 0.00     Types: Cigarettes     Quit date:      Years since quittin.4   • Smokeless tobacco: Never   • Tobacco comments:     Started at age 19/Stopped at age 45   Vaping Use   • Vaping status: Never Used   Substance Use Topics   • Alcohol use: Yes     Comment: rare   • Drug use: No       Procedures       Objective:     Visit Vitals  /80 (BP Location: Left arm, Patient Position: Sitting, Cuff Size: Adult)   Pulse 71   Ht 160 cm (63\")   Wt 61.9 kg (136 lb 6.4 oz)   LMP  (LMP Unknown)   SpO2 96%   BMI 24.16 kg/m²         Physical Exam    Lab Review:       Assessment:          Diagnosis Plan   1. NICM (nonischemic cardiomyopathy)        2. Hypertension, " essential, benign        3. Mixed hyperlipidemia               Plan:

## 2024-06-19 ENCOUNTER — HOSPITAL ENCOUNTER (OUTPATIENT)
Dept: CARDIOLOGY | Facility: HOSPITAL | Age: 64
Discharge: HOME | End: 2024-06-19
Admitting: INTERNAL MEDICINE
Payer: COMMERCIAL

## 2024-06-19 VITALS
DIASTOLIC BLOOD PRESSURE: 80 MMHG | HEIGHT: 63 IN | OXYGEN SATURATION: 98 % | WEIGHT: 136 LBS | BODY MASS INDEX: 24.1 KG/M2 | HEART RATE: 71 BPM | SYSTOLIC BLOOD PRESSURE: 120 MMHG

## 2024-06-19 DIAGNOSIS — I42.8 NICM (NONISCHEMIC CARDIOMYOPATHY): ICD-10-CM

## 2024-06-19 LAB
AORTIC ARCH: 2.4 CM
BH CV ECHO LEFT VENTRICLE GLOBAL LONGITUDINAL STRAIN: -21.6 %
BH CV ECHO MEAS - EDV(CUBED): 79.5 ML
BH CV ECHO MEAS - EDV(MOD-SP2): 53 ML
BH CV ECHO MEAS - EDV(MOD-SP4): 52 ML
BH CV ECHO MEAS - EF(MOD-BP): 55.7 %
BH CV ECHO MEAS - EF(MOD-SP2): 56.6 %
BH CV ECHO MEAS - EF(MOD-SP4): 57.7 %
BH CV ECHO MEAS - EF_3D-VOL: 58 %
BH CV ECHO MEAS - ESV(CUBED): 31.2 ML
BH CV ECHO MEAS - ESV(MOD-SP2): 23 ML
BH CV ECHO MEAS - ESV(MOD-SP4): 22 ML
BH CV ECHO MEAS - FS: 26.8 %
BH CV ECHO MEAS - IVS/LVPW: 1 CM
BH CV ECHO MEAS - IVSD: 0.9 CM
BH CV ECHO MEAS - LA 3D VOL INDEX: 27
BH CV ECHO MEAS - LAT PEAK E' VEL: 6.4 CM/SEC
BH CV ECHO MEAS - LV DIASTOLIC VOL/BSA (35-75): 31.7 CM2
BH CV ECHO MEAS - LV MASS(C)D: 123.3 GRAMS
BH CV ECHO MEAS - LV SYSTOLIC VOL/BSA (12-30): 13.4 CM2
BH CV ECHO MEAS - LVIDD: 4.3 CM
BH CV ECHO MEAS - LVIDS: 3.1 CM
BH CV ECHO MEAS - LVPWD: 0.9 CM
BH CV ECHO MEAS - MED PEAK E' VEL: 7.4 CM/SEC
BH CV ECHO MEAS - MR MAX PG: 89.8 MMHG
BH CV ECHO MEAS - MR MAX VEL: 473.8 CM/SEC
BH CV ECHO MEAS - MV A DUR: 0.07 SEC
BH CV ECHO MEAS - MV A MAX VEL: 89.7 CM/SEC
BH CV ECHO MEAS - MV DEC SLOPE: 681.8 CM/SEC2
BH CV ECHO MEAS - MV DEC TIME: 0.15 SEC
BH CV ECHO MEAS - MV E MAX VEL: 84.2 CM/SEC
BH CV ECHO MEAS - MV E/A: 0.94
BH CV ECHO MEAS - MV MAX PG: 5.2 MMHG
BH CV ECHO MEAS - MV MEAN PG: 2.6 MMHG
BH CV ECHO MEAS - MV P1/2T: 39.1 MSEC
BH CV ECHO MEAS - MV V2 VTI: 32.9 CM
BH CV ECHO MEAS - MVA(P1/2T): 5.6 CM2
BH CV ECHO MEAS - PULM A REVS DUR: 0.09 SEC
BH CV ECHO MEAS - PULM A REVS VEL: 29.6 CM/SEC
BH CV ECHO MEAS - PULM DIAS VEL: 40.3 CM/SEC
BH CV ECHO MEAS - PULM S/D: 1.09
BH CV ECHO MEAS - PULM SYS VEL: 43.7 CM/SEC
BH CV ECHO MEAS - RAP SYSTOLE: 3 MMHG
BH CV ECHO MEAS - RVSP: 30 MMHG
BH CV ECHO MEAS - SUP REN AO DIAM: 1.9 CM
BH CV ECHO MEAS - SV(MOD-SP2): 30 ML
BH CV ECHO MEAS - SV(MOD-SP4): 30 ML
BH CV ECHO MEAS - SVI(MOD-SP2): 18.3 ML/M2
BH CV ECHO MEAS - SVI(MOD-SP4): 18.3 ML/M2
BH CV ECHO MEAS - TAPSE (>1.6): 1.79 CM
BH CV ECHO MEAS - TR MAX PG: 26.9 MMHG
BH CV ECHO MEAS - TR MAX VEL: 259.3 CM/SEC
BH CV ECHO MEASUREMENTS AVERAGE E/E' RATIO: 12.2
BH CV VAS BP LEFT ARM: NORMAL MMHG
BH CV XLRA - TDI S': 9.9 CM/SEC
LEFT ATRIUM VOLUME INDEX: 22 ML/M2

## 2024-06-19 PROCEDURE — 93356 MYOCRD STRAIN IMG SPCKL TRCK: CPT

## 2024-06-19 PROCEDURE — 93321 DOPPLER ECHO F-UP/LMTD STD: CPT

## 2024-06-19 PROCEDURE — 93308 TTE F-UP OR LMTD: CPT | Performed by: INTERNAL MEDICINE

## 2024-06-19 PROCEDURE — 93308 TTE F-UP OR LMTD: CPT

## 2024-06-19 PROCEDURE — 93325 DOPPLER ECHO COLOR FLOW MAPG: CPT | Performed by: INTERNAL MEDICINE

## 2024-06-19 PROCEDURE — 93356 MYOCRD STRAIN IMG SPCKL TRCK: CPT | Performed by: INTERNAL MEDICINE

## 2024-06-19 PROCEDURE — 93325 DOPPLER ECHO COLOR FLOW MAPG: CPT

## 2024-06-19 PROCEDURE — 93321 DOPPLER ECHO F-UP/LMTD STD: CPT | Performed by: INTERNAL MEDICINE

## 2024-07-02 ENCOUNTER — OFFICE VISIT (OUTPATIENT)
Dept: ONCOLOGY | Facility: CLINIC | Age: 64
End: 2024-07-02
Payer: COMMERCIAL

## 2024-07-02 ENCOUNTER — LAB (OUTPATIENT)
Dept: OTHER | Facility: HOSPITAL | Age: 64
End: 2024-07-02
Payer: COMMERCIAL

## 2024-07-02 VITALS
BODY MASS INDEX: 23.83 KG/M2 | RESPIRATION RATE: 16 BRPM | HEIGHT: 63 IN | SYSTOLIC BLOOD PRESSURE: 116 MMHG | OXYGEN SATURATION: 99 % | WEIGHT: 134.5 LBS | HEART RATE: 69 BPM | TEMPERATURE: 98.3 F | DIASTOLIC BLOOD PRESSURE: 73 MMHG

## 2024-07-02 DIAGNOSIS — R23.3 EASY BRUISING: Primary | ICD-10-CM

## 2024-07-02 DIAGNOSIS — R23.3 EASY BRUISING: ICD-10-CM

## 2024-07-02 DIAGNOSIS — T14.8XXA BRUISING: ICD-10-CM

## 2024-07-02 LAB
BASOPHILS # BLD AUTO: 0.05 10*3/MM3 (ref 0–0.2)
BASOPHILS NFR BLD AUTO: 0.5 % (ref 0–1.5)
DEPRECATED RDW RBC AUTO: 43.4 FL (ref 37–54)
EOSINOPHIL # BLD AUTO: 0.23 10*3/MM3 (ref 0–0.4)
EOSINOPHIL NFR BLD AUTO: 2.2 % (ref 0.3–6.2)
ERYTHROCYTE [DISTWIDTH] IN BLOOD BY AUTOMATED COUNT: 13.4 % (ref 12.3–15.4)
HCT VFR BLD AUTO: 37.1 % (ref 34–46.6)
HGB BLD-MCNC: 12.2 G/DL (ref 12–15.9)
IMM GRANULOCYTES # BLD AUTO: 0.04 10*3/MM3 (ref 0–0.05)
IMM GRANULOCYTES NFR BLD AUTO: 0.4 % (ref 0–0.5)
LYMPHOCYTES # BLD AUTO: 3.4 10*3/MM3 (ref 0.7–3.1)
LYMPHOCYTES NFR BLD AUTO: 33 % (ref 19.6–45.3)
MCH RBC QN AUTO: 28.9 PG (ref 26.6–33)
MCHC RBC AUTO-ENTMCNC: 32.9 G/DL (ref 31.5–35.7)
MCV RBC AUTO: 87.9 FL (ref 79–97)
MONOCYTES # BLD AUTO: 0.78 10*3/MM3 (ref 0.1–0.9)
MONOCYTES NFR BLD AUTO: 7.6 % (ref 5–12)
NEUTROPHILS NFR BLD AUTO: 5.81 10*3/MM3 (ref 1.7–7)
NEUTROPHILS NFR BLD AUTO: 56.3 % (ref 42.7–76)
NRBC BLD AUTO-RTO: 0 /100 WBC (ref 0–0.2)
PLATELET # BLD AUTO: 415 10*3/MM3 (ref 140–450)
PMV BLD AUTO: 10 FL (ref 6–12)
RBC # BLD AUTO: 4.22 10*6/MM3 (ref 3.77–5.28)
WBC NRBC COR # BLD AUTO: 10.31 10*3/MM3 (ref 3.4–10.8)

## 2024-07-02 PROCEDURE — 99214 OFFICE O/P EST MOD 30 MIN: CPT | Performed by: INTERNAL MEDICINE

## 2024-07-02 PROCEDURE — 36415 COLL VENOUS BLD VENIPUNCTURE: CPT

## 2024-07-02 PROCEDURE — 85025 COMPLETE CBC W/AUTO DIFF WBC: CPT | Performed by: INTERNAL MEDICINE

## 2024-07-02 NOTE — LETTER
July 2, 2024     Dasia Glez PA-C  [id number] [facility]  [address]  [place] [id number]    Patient: Arlene Quesada   YOB: 1960   Date of Visit: 7/2/2024     Dear Dasia Glez PA-C:       Thank you for referring Arlene Quesada to me for evaluation. Below are the relevant portions of my assessment and plan of care.    If you have questions, please do not hesitate to call me. I look forward to following Arlene along with you.         Sincerely,        Maycol Haynes MD        CC: No Recipients    Maycol Haynes MD  07/02/24 [id number]  Sign when Signing Visit        REASON FOR CONSULTATION: Easy bruisability  REQUESTING PHYSICIAN: Dasia Glez PA-C      History of Present Illness   The patient is a 64-year-old female followed with GE reflux, hypothyroidism, arthritis, hypertension, hyperlipidemia, anxiety and depression.  She had presented to the ER 4/21/2024 with sudden onset substernal chest pain.  She had thought this was her GERD which is often managed with PPI therapy but did not improve with her usual remedies.    She was seen in outside emergency room with EKG is unremarkable, initial troponin elevated 207 and then repeated 411.  She is transferred to [facility] for further evaluation.    Subsequently, A cardiac catheterization was performed that showed no significant atherosclerotic coronary disease.  There is normal contractility of the left ventricular basal segments and apex.  The remaining left ventricular wall segments were akinetic to dyskinetic.  The pattern of the left ventricular wall motion abnormalities was consistent with Takotsubo cardiomyopathy.  Left ventricular systolic function appeared to be severely depressed with an EF of 25 to 30%.An echocardiogram was also obtained that showed moderately decreased LV systolic function with an EF of 33.2% and wall motion abnormalities suspicious for stress-induced cardiomyopathy.  She was started on guideline directed medical therapy with Toprol-XL.   She was previously on benazepril and amlodipine.  Amlodipine was stopped in light of her reduced heart function and benazepril was placed on hold for relatively low blood pressures.     She is able to discharge 4/23/2024 with groin site without palpable hematoma, off Toprol-XL and held from starting ACE ARB or Entresto given her hypotension.    She had, before this hospitalization, been referred, apparently, for easy bruisability.  This is primarily involves her upper extremities particularly over the last year.  A thorough review of her medications has been obtained as well as her weight loss from treatment with Wegovy not documented at least 40+ pounds over the last year.    The patient went on to be tested with negative findings for von Willebrand's panel, normal platelet function, fibrinogen 645.  Again her previous coagulation screens have been negative.  She is seen back 7/2/2025 and it is felt that the combination of her weight loss from Wegovy use, loss of subcutaneous fat and the use of ongoing higher doses of Prozac are producing her bruising that is primarily aches seen on her upper extremities where her weight loss has been the most excessive.  Patient is not felt to have a coagulation disorder.    Past Medical History:   Diagnosis Date   • Anemia, iron deficiency    • Arthritis    • Depression    • Gastroesophageal reflux    • Glaucoma    • History of complete eye exam 59325    [facility]: early glaucoma   • History of EKG 03/2006    borderline QT prolonged, NSR   • History of MRI of cervical spine 03/29/2011    Multilevel DDD with multilevel spinal steonsis, most severe at C5-6 and C6-7, less prominent at C3-4 and C4-5, there is neuroforaminimal compromise on the right at multiple levels   • History of MRI of lumbar spine 03/29/2011    Multilevel DDD most prominent at L4-5 and L5-S1, moderate facet DDD, protrusion in L4-5 and mild disc bulge at L5-S1   • Hyperlipemia    • Hypertension, essential,  benign    • Low back pain    • Nausea 01/23/2015   • Reflux esophagitis    • Thyroid disorder    • Thyroid nodule     Thyroidectomy- 5-6 years ago    Past Surgical History:   Procedure Laterality Date   • APPENDECTOMY     • BREAST BIOPSY  09/26/2013    left - benign fibroadenoma   • CARDIAC CATHETERIZATION N/A 4/21/2024    Procedure: Left Heart Cath;  Surgeon: Marjorie Phillips MD;  Location:  [facility];  Service: Cardiology;  Laterality: N/A;   • CARDIAC CATHETERIZATION N/A 4/21/2024    Procedure: Coronary angiography;  Surgeon: Marjorie Phillips MD;  Location:  [facility];  Service: Cardiology;  Laterality: N/A;   • CARDIAC CATHETERIZATION N/A 4/21/2024    Procedure: Left ventriculography;  Surgeon: Marjorie Phillips MD;  Location:  [facility];  Service: Cardiology;  Laterality: N/A;   • CHOLECYSTECTOMY     • COLONOSCOPY     • COLPOSCOPY     • ENDOMETRIAL ABLATION     • ENDOSCOPY  03/2006    Dr. Ruiz: clean based gastric ulcer   • ENDOSCOPY N/A 12/13/2023    Procedure: ESOPHAGOGASTRODUODENOSCOPY with cold biopsies;  Surgeon: Silver Rose MD;  Location: [facility] ENDOSCOPY;  Service: Gastroenterology;  Laterality: N/A;  Pre: dysphagia  Post: normal   • HYSTERECTOMY  10/2010   • THYROIDECTOMY, PARTIAL Right    • TRABECULECTOMY Bilateral    • UPPER GASTROINTESTINAL ENDOSCOPY  2021        Current Outpatient Medications on File Prior to Visit   Medication Sig Dispense Refill   • albuterol sulfate  (90 Base) MCG/ACT inhaler Inhale 2 puffs Every 4 (Four) Hours As Needed.     • buPROPion XL (WELLBUTRIN XL) 300 MG 24 hr tablet Take 1 tablet by mouth Every Morning. For mood 90 tablet 3   • Cholecalciferol (VITAMIN D3) 2000 UNITS tablet Take by mouth. Take 1 capsule by oral route daily for 90 days     • esomeprazole (nexIUM) 40 MG capsule Take 1 capsule by mouth Every Morning Before Breakfast.     • FLUoxetine (PROzac) 40 MG capsule Take 2 capsules by mouth  Daily. For depression and anxiety 180 capsule 3   • Insulin Pen Needle 32G X 4 MM misc For once daily use with daily injection 100 each 3   • LORazepam (ATIVAN) 0.5 MG tablet      • metaxalone (SKELAXIN) 800 MG tablet Take 1 tablet by mouth 2 (Two) Times a Day As Needed for Muscle Spasms. 60 tablet 1   • metFORMIN ER (GLUCOPHAGE-XR) 500 MG 24 hr tablet TAKE 1 TABLET DAILY 90 tablet 0   • metoprolol succinate XL (TOPROL-XL) 25 MG 24 hr tablet Take 1 tablet by mouth Daily. 90 tablet 3   • montelukast (SINGULAIR) 10 MG tablet Take 1 tablet by mouth Every Night. For allergies 90 tablet 3   • mupirocin (Bactroban) 2 % ointment Apply  topically to the appropriate area as directed 3 (Three) Times a Day. To wound area until healed 30 each 1   • NP Thyroid 60 MG tablet Take 1 tablet by mouth.     • NP Thyroid 90 MG tablet      • Rocklatan 0.02-0.005 % solution      • Semaglutide-Weight Management (Wegovy) 0.25 MG/0.5ML solution auto-injector Inject 0.25 mg under the skin into the appropriate area as directed 1 (One) Time Per Week. Inject 0.25 mg SQ weekly x4 2 mL 0   • terconazole (TERAZOL 3) 0.8 % vaginal cream Insert 1 applicator into the vagina Every Night. X 3 days for yeast 20 g 5   • Testosterone Propionate (FIRST-TESTOSTERONE) 2 % ointment Place  on the skin as directed by provider.     • timolol (TIMOPTIC) 0.5 % ophthalmic solution      • valACYclovir (VALTREX) 1000 MG tablet TAKE 2 TABLETS BY MOUTH AT ONSET FOR FEVER BLISTER. REPEAT THIS DOSE 1 TIME IN 12 HOURS 20 tablet 5   • XIIDRA 5 % solution      • bimatoprost (LUMIGAN) 0.01 % ophthalmic drops 1 drop Every Night. Instill 1 in affected eye once a day (at bedtime) (Patient not taking: Reported on 5/7/2024)     • methylPREDNISolone (MEDROL) 4 MG dose pack TAKE BY MOUTH AS DIRECTED IN PACKAGE (Patient not taking: Reported on 7/2/2024)       No current facility-administered medications on file prior to visit.        ALLERGIES:    Allergies   Allergen Reactions   •  "Carrot [Daucus Carota] Anaphylaxis   • Banana Itching     Throat itching     • Augmentin [Amoxicillin-Pot Clavulanate] Diarrhea and GI Intolerance        Social History     Socioeconomic History   • Marital status:    Tobacco Use   • Smoking status: Former     Current packs/day: 0.00     Types: Cigarettes     Quit date:      Years since quittin.5   • Smokeless tobacco: Never   • Tobacco comments:     Started at age 19/Stopped at age 45   Vaping Use   • Vaping status: Never Used   Substance and Sexual Activity   • Alcohol use: Yes     Comment: rare   • Drug use: No   • Sexual activity: Yes     Partners: Female        Family History   Problem Relation Age of Onset   • Breast cancer Mother    • Kidney cancer Mother    • Hypertension Father    • Diabetes Brother         Review of Systems   Constitutional:  Positive for unexpected weight change (42 lbs-7 mos, Wegovy).   HENT:  Positive for hearing loss (Left), sinus pressure, sinus pain and trouble swallowing (?).    Eyes:         Glaucoma   Respiratory:  Positive for choking and shortness of breath.    Cardiovascular:  Positive for chest pain.   Gastrointestinal:         Reflux, H/H   Musculoskeletal:  Positive for back pain and neck pain.   Allergic/Immunologic: Positive for environmental allergies.   Hematological:  Bruises/bleeds easily.   Psychiatric/Behavioral:  Positive for decreased concentration.         Objective    Vitals:    24 1546   BP: 116/73   Pulse: 69   Resp: 16   Temp: 98.3 °F (36.8 °C)   TempSrc: Oral   SpO2: 99%   Weight: 61 kg (134 lb 8 oz)   Height: 160 cm (62.99\")   PainSc: 0-No pain           2024     3:47 PM   Current Status   ECOG score 0       Physical Exam  Constitutional:       Appearance: Normal appearance.   HENT:      Head: Normocephalic and atraumatic.      Nose: Nose normal.      Mouth/Throat:      Mouth: Mucous membranes are moist.      Pharynx: Oropharynx is clear.   Eyes:      Extraocular Movements: " Extraocular movements intact.      Conjunctiva/sclera: Conjunctivae normal.      Pupils: Pupils are equal, round, and reactive to light.   Cardiovascular:      Rate and Rhythm: Normal rate and regular rhythm.      Pulses: Normal pulses.      Heart sounds: Normal heart sounds.   Pulmonary:      Effort: Pulmonary effort is normal.      Breath sounds: Normal breath sounds.   Abdominal:      General: Bowel sounds are normal.      Palpations: Abdomen is soft.   Musculoskeletal:         General: Swelling present.      Cervical back: Normal range of motion and neck supple.   Skin:     General: Skin is warm and dry.      Findings: Bruising (Mild ecchymoses upper extremities bilaterally) present.   Neurological:      General: No focal deficit present.      Mental Status: She is oriented to person, place, and time.   Psychiatric:         Mood and Affect: Mood normal.         Behavior: Behavior normal.           RECENT LABS:  Hematology WBC   Date Value Ref Range Status   07/02/2024 10.31 3.40 - 10.80 10*3/mm3 Final   02/14/2024 9.4 3.4 - 10.8 x10E3/uL Final     RBC   Date Value Ref Range Status   07/02/2024 4.22 3.77 - 5.28 10*6/mm3 Final   02/14/2024 4.12 3.77 - 5.28 x10E6/uL Final     Hemoglobin   Date Value Ref Range Status   07/02/2024 12.2 12.0 - 15.9 g/dL Final     Hematocrit   Date Value Ref Range Status   07/02/2024 37.1 34.0 - 46.6 % Final     Platelets   Date Value Ref Range Status   07/02/2024 415 140 - 450 10*3/mm3 Final          Assessment & Plan     Patient is a 64-year-old female we are asked to see for easy bruising.  She had just been hospitalized 4/20-21/24 having a medical history inclusive of anxiety, depression, reflux, hypothyroidism, arthritis, hyperlipidemia, hypertension who presented to ER with complaints of chest pain.  This happened while she was folding laundry when she had sudden onset of chest pain and left arm numbness.  She will have similar symptoms usually improve with PPI and Tums but this  became far more severe.  She presented to an outside emergency room with unremarkable EKG, white count 12,300, troponin of 207, repeat of 411.  She is to have nitroglycerin, treated with enoxaparin and transferred for further assessment.     cardiac catheterization was performed that showed no significant atherosclerotic coronary disease.  There is normal contractility of the left ventricular basal segments and apex.  The remaining left ventricular wall segments were akinetic to dyskinetic.  The pattern of the left ventricular wall motion abnormalities was consistent with Takotsubo cardiomyopathy.  Left ventricular systolic function appeared to be severely depressed with an EF of 25 to 30%.An echocardiogram was also obtained that showed moderately decreased LV systolic function with an EF of 33.2% and wall motion abnormalities suspicious for stress-induced cardiomyopathy.      Additional studies include a normal PT, PTT and activated clotting time.  Additional studies demonstrated a modest degree of thrombocytosis documented 5 years ago and variable through her recent hospitalization.    She was able to discharge 4/23/2024 with groin site without palpable hematoma, off Toprol-XL and held from starting ACE ARB or Entresto given her hypotension.    She had, before this hospitalization, been referred, apparently, for easy bruisability.  This is primarily involves her upper extremities particularly over the last year.  A thorough review of her medications has been obtained as well as her weight loss from treatment with Wegovy not documented at least 40+ pounds over the last year.    We have discussed that her findings are limited to her upper extremities and would suggest weight loss and loss of subcutaneous fat in her arms with concern that her current additional medications particularly high-dose Prozac.  In discussing this with her she states that she clearly feels better on this medication and does not wish to  discontinue it.  Her thrombocytosis also needs to be investigated to some degree to determine whether it could be associated with qualitative platelet defect as well.    The patient went on to be tested with negative findings for von Willebrand's panel, normal platelet function, fibrinogen 645.  Again her previous coagulation screens have been negative.  She is seen back 7/2/2025 and it is felt that the combination of her weight loss from Wegovy use, loss of subcutaneous fat and the use of ongoing higher doses of Prozac are producing her bruising that is primarily aches seen on her upper extremities where her weight loss has been the most excessive.      Plan:  *No follow-up will be necessary at this point.    *At this point she wishes to continue Wegovy use as well as her Prozac which she finds very necessary to control her depression.  As result she will have bruising on her upper extremities and she recognizes that this is likely as she stays on these medications.

## 2024-07-02 NOTE — PROGRESS NOTES
REASON FOR CONSULTATION: Easy bruisability  REQUESTING PHYSICIAN: Dasia Glez PA-C      History of Present Illness   The patient is a 64-year-old female followed with GE reflux, hypothyroidism, arthritis, hypertension, hyperlipidemia, anxiety and depression.  She had presented to the ER 4/21/2024 with sudden onset substernal chest pain.  She had thought this was her GERD which is often managed with PPI therapy but did not improve with her usual remedies.    She was seen in outside emergency room with EKG is unremarkable, initial troponin elevated 207 and then repeated 411.  She is transferred to [facility] for further evaluation.    Subsequently, A cardiac catheterization was performed that showed no significant atherosclerotic coronary disease.  There is normal contractility of the left ventricular basal segments and apex.  The remaining left ventricular wall segments were akinetic to dyskinetic.  The pattern of the left ventricular wall motion abnormalities was consistent with Takotsubo cardiomyopathy.  Left ventricular systolic function appeared to be severely depressed with an EF of 25 to 30%.An echocardiogram was also obtained that showed moderately decreased LV systolic function with an EF of 33.2% and wall motion abnormalities suspicious for stress-induced cardiomyopathy.  She was started on guideline directed medical therapy with Toprol-XL.  She was previously on benazepril and amlodipine.  Amlodipine was stopped in light of her reduced heart function and benazepril was placed on hold for relatively low blood pressures.     She is able to discharge 4/23/2024 with groin site without palpable hematoma, off Toprol-XL and held from starting ACE ARB or Entresto given her hypotension.    She had, before this hospitalization, been referred, apparently, for easy bruisability.  This is primarily involves her upper extremities particularly over the last year.  A thorough review of her medications has been obtained as  well as her weight loss from treatment with Wegovy not documented at least 40+ pounds over the last year.    The patient went on to be tested with negative findings for von Willebrand's panel, normal platelet function, fibrinogen 645.  Again her previous coagulation screens have been negative.  She is seen back 7/2/2025 and it is felt that the combination of her weight loss from Wegovy use, loss of subcutaneous fat and the use of ongoing higher doses of Prozac are producing her bruising that is primarily aches seen on her upper extremities where her weight loss has been the most excessive.  Patient is not felt to have a coagulation disorder.    Past Medical History:   Diagnosis Date    Anemia, iron deficiency     Arthritis     Depression     Gastroesophageal reflux     Glaucoma     History of complete eye exam 15586    [facility]: early glaucoma    History of EKG 03/2006    borderline QT prolonged, NSR    History of MRI of cervical spine 03/29/2011    Multilevel DDD with multilevel spinal steonsis, most severe at C5-6 and C6-7, less prominent at C3-4 and C4-5, there is neuroforaminimal compromise on the right at multiple levels    History of MRI of lumbar spine 03/29/2011    Multilevel DDD most prominent at L4-5 and L5-S1, moderate facet DDD, protrusion in L4-5 and mild disc bulge at L5-S1    Hyperlipemia     Hypertension, essential, benign     Low back pain     Nausea 01/23/2015    Reflux esophagitis     Thyroid disorder     Thyroid nodule     Thyroidectomy- 5-6 years ago    Past Surgical History:   Procedure Laterality Date    APPENDECTOMY      BREAST BIOPSY  09/26/2013    left - benign fibroadenoma    CARDIAC CATHETERIZATION N/A 4/21/2024    Procedure: Left Heart Cath;  Surgeon: Marjorie Phillips MD;  Location: [facility] INVASIVE LOCATION;  Service: Cardiology;  Laterality: N/A;    CARDIAC CATHETERIZATION N/A 4/21/2024    Procedure: Coronary angiography;  Surgeon: Marjorie Phillips MD;  Location: [facility] CATH  INVASIVE LOCATION;  Service: Cardiology;  Laterality: N/A;    CARDIAC CATHETERIZATION N/A 4/21/2024    Procedure: Left ventriculography;  Surgeon: Marjorie Phillips MD;  Location:  [facility] CATH INVASIVE LOCATION;  Service: Cardiology;  Laterality: N/A;    CHOLECYSTECTOMY      COLONOSCOPY      COLPOSCOPY      ENDOMETRIAL ABLATION      ENDOSCOPY  03/2006    Dr. Ruiz: clean based gastric ulcer    ENDOSCOPY N/A 12/13/2023    Procedure: ESOPHAGOGASTRODUODENOSCOPY with cold biopsies;  Surgeon: Silver Rose MD;  Location: [facility] ENDOSCOPY;  Service: Gastroenterology;  Laterality: N/A;  Pre: dysphagia  Post: normal    HYSTERECTOMY  10/2010    THYROIDECTOMY, PARTIAL Right     TRABECULECTOMY Bilateral     UPPER GASTROINTESTINAL ENDOSCOPY  2021        Current Outpatient Medications on File Prior to Visit   Medication Sig Dispense Refill    albuterol sulfate  (90 Base) MCG/ACT inhaler Inhale 2 puffs Every 4 (Four) Hours As Needed.      buPROPion XL (WELLBUTRIN XL) 300 MG 24 hr tablet Take 1 tablet by mouth Every Morning. For mood 90 tablet 3    Cholecalciferol (VITAMIN D3) 2000 UNITS tablet Take by mouth. Take 1 capsule by oral route daily for 90 days      esomeprazole (nexIUM) 40 MG capsule Take 1 capsule by mouth Every Morning Before Breakfast.      FLUoxetine (PROzac) 40 MG capsule Take 2 capsules by mouth Daily. For depression and anxiety 180 capsule 3    Insulin Pen Needle 32G X 4 MM misc For once daily use with daily injection 100 each 3    LORazepam (ATIVAN) 0.5 MG tablet       metaxalone (SKELAXIN) 800 MG tablet Take 1 tablet by mouth 2 (Two) Times a Day As Needed for Muscle Spasms. 60 tablet 1    metFORMIN ER (GLUCOPHAGE-XR) 500 MG 24 hr tablet TAKE 1 TABLET DAILY 90 tablet 0    metoprolol succinate XL (TOPROL-XL) 25 MG 24 hr tablet Take 1 tablet by mouth Daily. 90 tablet 3    montelukast (SINGULAIR) 10 MG tablet Take 1 tablet by mouth Every Night. For allergies 90 tablet 3    mupirocin (Bactroban) 2 %  ointment Apply  topically to the appropriate area as directed 3 (Three) Times a Day. To wound area until healed 30 each 1    NP Thyroid 60 MG tablet Take 1 tablet by mouth.      NP Thyroid 90 MG tablet       Rocklatan 0.02-0.005 % solution       Semaglutide-Weight Management (Wegovy) 0.25 MG/0.5ML solution auto-injector Inject 0.25 mg under the skin into the appropriate area as directed 1 (One) Time Per Week. Inject 0.25 mg SQ weekly x4 2 mL 0    terconazole (TERAZOL 3) 0.8 % vaginal cream Insert 1 applicator into the vagina Every Night. X 3 days for yeast 20 g 5    Testosterone Propionate (FIRST-TESTOSTERONE) 2 % ointment Place  on the skin as directed by provider.      timolol (TIMOPTIC) 0.5 % ophthalmic solution       valACYclovir (VALTREX) 1000 MG tablet TAKE 2 TABLETS BY MOUTH AT ONSET FOR FEVER BLISTER. REPEAT THIS DOSE 1 TIME IN 12 HOURS 20 tablet 5    XIIDRA 5 % solution       bimatoprost (LUMIGAN) 0.01 % ophthalmic drops 1 drop Every Night. Instill 1 in affected eye once a day (at bedtime) (Patient not taking: Reported on 2024)      methylPREDNISolone (MEDROL) 4 MG dose pack TAKE BY MOUTH AS DIRECTED IN PACKAGE (Patient not taking: Reported on 2024)       No current facility-administered medications on file prior to visit.        ALLERGIES:    Allergies   Allergen Reactions    Carrot [Daucus Carota] Anaphylaxis    Banana Itching     Throat itching      Augmentin [Amoxicillin-Pot Clavulanate] Diarrhea and GI Intolerance        Social History     Socioeconomic History    Marital status:    Tobacco Use    Smoking status: Former     Current packs/day: 0.00     Types: Cigarettes     Quit date:      Years since quittin.5    Smokeless tobacco: Never    Tobacco comments:     Started at age 19/Stopped at age 45   Vaping Use    Vaping status: Never Used   Substance and Sexual Activity    Alcohol use: Yes     Comment: rare    Drug use: No    Sexual activity: Yes     Partners: Female     "    Family History   Problem Relation Age of Onset    Breast cancer Mother     Kidney cancer Mother     Hypertension Father     Diabetes Brother         Review of Systems   Constitutional:  Positive for unexpected weight change (42 lbs-7 mos, Wegovy).   HENT:  Positive for hearing loss (Left), sinus pressure, sinus pain and trouble swallowing (?).    Eyes:         Glaucoma   Respiratory:  Positive for choking and shortness of breath.    Cardiovascular:  Positive for chest pain.   Gastrointestinal:         Reflux, H/H   Musculoskeletal:  Positive for back pain and neck pain.   Allergic/Immunologic: Positive for environmental allergies.   Hematological:  Bruises/bleeds easily.   Psychiatric/Behavioral:  Positive for decreased concentration.         Objective     Vitals:    07/02/24 1546   BP: 116/73   Pulse: 69   Resp: 16   Temp: 98.3 °F (36.8 °C)   TempSrc: Oral   SpO2: 99%   Weight: 61 kg (134 lb 8 oz)   Height: 160 cm (62.99\")   PainSc: 0-No pain           7/2/2024     3:47 PM   Current Status   ECOG score 0       Physical Exam  Constitutional:       Appearance: Normal appearance.   HENT:      Head: Normocephalic and atraumatic.      Nose: Nose normal.      Mouth/Throat:      Mouth: Mucous membranes are moist.      Pharynx: Oropharynx is clear.   Eyes:      Extraocular Movements: Extraocular movements intact.      Conjunctiva/sclera: Conjunctivae normal.      Pupils: Pupils are equal, round, and reactive to light.   Cardiovascular:      Rate and Rhythm: Normal rate and regular rhythm.      Pulses: Normal pulses.      Heart sounds: Normal heart sounds.   Pulmonary:      Effort: Pulmonary effort is normal.      Breath sounds: Normal breath sounds.   Abdominal:      General: Bowel sounds are normal.      Palpations: Abdomen is soft.   Musculoskeletal:         General: Swelling present.      Cervical back: Normal range of motion and neck supple.   Skin:     General: Skin is warm and dry.      Findings: Bruising (Mild " ecchymoses upper extremities bilaterally) present.   Neurological:      General: No focal deficit present.      Mental Status: She is oriented to person, place, and time.   Psychiatric:         Mood and Affect: Mood normal.         Behavior: Behavior normal.           RECENT LABS:  Hematology WBC   Date Value Ref Range Status   07/02/2024 10.31 3.40 - 10.80 10*3/mm3 Final   02/14/2024 9.4 3.4 - 10.8 x10E3/uL Final     RBC   Date Value Ref Range Status   07/02/2024 4.22 3.77 - 5.28 10*6/mm3 Final   02/14/2024 4.12 3.77 - 5.28 x10E6/uL Final     Hemoglobin   Date Value Ref Range Status   07/02/2024 12.2 12.0 - 15.9 g/dL Final     Hematocrit   Date Value Ref Range Status   07/02/2024 37.1 34.0 - 46.6 % Final     Platelets   Date Value Ref Range Status   07/02/2024 415 140 - 450 10*3/mm3 Final          Assessment & Plan      Patient is a 64-year-old female we are asked to see for easy bruising.  She had just been hospitalized 4/20-21/24 having a medical history inclusive of anxiety, depression, reflux, hypothyroidism, arthritis, hyperlipidemia, hypertension who presented to ER with complaints of chest pain.  This happened while she was folding laundry when she had sudden onset of chest pain and left arm numbness.  She will have similar symptoms usually improve with PPI and Tums but this became far more severe.  She presented to an outside emergency room with unremarkable EKG, white count 12,300, troponin of 207, repeat of 411.  She is to have nitroglycerin, treated with enoxaparin and transferred for further assessment.     cardiac catheterization was performed that showed no significant atherosclerotic coronary disease.  There is normal contractility of the left ventricular basal segments and apex.  The remaining left ventricular wall segments were akinetic to dyskinetic.  The pattern of the left ventricular wall motion abnormalities was consistent with Takotsubo cardiomyopathy.  Left ventricular systolic function  appeared to be severely depressed with an EF of 25 to 30%.An echocardiogram was also obtained that showed moderately decreased LV systolic function with an EF of 33.2% and wall motion abnormalities suspicious for stress-induced cardiomyopathy.      Additional studies include a normal PT, PTT and activated clotting time.  Additional studies demonstrated a modest degree of thrombocytosis documented 5 years ago and variable through her recent hospitalization.    She was able to discharge 4/23/2024 with groin site without palpable hematoma, off Toprol-XL and held from starting ACE ARB or Entresto given her hypotension.    She had, before this hospitalization, been referred, apparently, for easy bruisability.  This is primarily involves her upper extremities particularly over the last year.  A thorough review of her medications has been obtained as well as her weight loss from treatment with Wegovy not documented at least 40+ pounds over the last year.    We have discussed that her findings are limited to her upper extremities and would suggest weight loss and loss of subcutaneous fat in her arms with concern that her current additional medications particularly high-dose Prozac.  In discussing this with her she states that she clearly feels better on this medication and does not wish to discontinue it.  Her thrombocytosis also needs to be investigated to some degree to determine whether it could be associated with qualitative platelet defect as well.    The patient went on to be tested with negative findings for von Willebrand's panel, normal platelet function, fibrinogen 645.  Again her previous coagulation screens have been negative.  She is seen back 7/2/2025 and it is felt that the combination of her weight loss from Wegovy use, loss of subcutaneous fat and the use of ongoing higher doses of Prozac are producing her bruising that is primarily aches seen on her upper extremities where her weight loss has been the most  excessive.      Plan:  *No follow-up will be necessary at this point.    *At this point she wishes to continue Wegovy use as well as her Prozac which she finds very necessary to control her depression.  As result she will have bruising on her upper extremities and she recognizes that this is likely as she stays on these medications.

## 2024-07-30 ENCOUNTER — TRANSCRIBE ORDERS (OUTPATIENT)
Dept: ADMINISTRATIVE | Facility: HOSPITAL | Age: 64
End: 2024-07-30
Payer: COMMERCIAL

## 2024-07-30 ENCOUNTER — LAB (OUTPATIENT)
Dept: LAB | Facility: HOSPITAL | Age: 64
End: 2024-07-30
Payer: COMMERCIAL

## 2024-07-30 DIAGNOSIS — E89.0 POSTSURGICAL HYPOTHYROIDISM: Primary | ICD-10-CM

## 2024-07-30 DIAGNOSIS — E89.0 POSTSURGICAL HYPOTHYROIDISM: ICD-10-CM

## 2024-07-30 LAB
T3 SERPL-MCNC: 114 NG/DL (ref 80–200)
T4 FREE SERPL-MCNC: 0.89 NG/DL (ref 0.92–1.68)
TSH SERPL DL<=0.05 MIU/L-ACNC: 0.5 UIU/ML (ref 0.27–4.2)

## 2024-07-30 PROCEDURE — 84439 ASSAY OF FREE THYROXINE: CPT

## 2024-07-30 PROCEDURE — 84443 ASSAY THYROID STIM HORMONE: CPT

## 2024-07-30 PROCEDURE — 36415 COLL VENOUS BLD VENIPUNCTURE: CPT

## 2024-07-30 PROCEDURE — 84480 ASSAY TRIIODOTHYRONINE (T3): CPT

## 2024-08-05 ENCOUNTER — OFFICE VISIT (OUTPATIENT)
Dept: CARDIAC REHAB | Facility: HOSPITAL | Age: 64
End: 2024-08-05
Payer: COMMERCIAL

## 2024-08-05 DIAGNOSIS — I21.4 NON-STEMI (NON-ST ELEVATED MYOCARDIAL INFARCTION): Primary | ICD-10-CM

## 2024-08-05 DIAGNOSIS — I51.81 TAKOTSUBO CARDIOMYOPATHY: ICD-10-CM

## 2024-08-05 PROCEDURE — 93798 PHYS/QHP OP CAR RHAB W/ECG: CPT

## 2024-08-05 PROCEDURE — 93797 PHYS/QHP OP CAR RHAB WO ECG: CPT

## 2024-08-12 ENCOUNTER — TREATMENT (OUTPATIENT)
Dept: CARDIAC REHAB | Facility: HOSPITAL | Age: 64
End: 2024-08-12
Payer: COMMERCIAL

## 2024-08-12 DIAGNOSIS — I21.4 NON-STEMI (NON-ST ELEVATED MYOCARDIAL INFARCTION): Primary | ICD-10-CM

## 2024-08-12 PROCEDURE — 93798 PHYS/QHP OP CAR RHAB W/ECG: CPT

## 2024-08-14 ENCOUNTER — TREATMENT (OUTPATIENT)
Dept: CARDIAC REHAB | Facility: HOSPITAL | Age: 64
End: 2024-08-14
Payer: COMMERCIAL

## 2024-08-14 ENCOUNTER — OFFICE VISIT (OUTPATIENT)
Dept: FAMILY MEDICINE CLINIC | Facility: CLINIC | Age: 64
End: 2024-08-14
Payer: COMMERCIAL

## 2024-08-14 ENCOUNTER — PATIENT MESSAGE (OUTPATIENT)
Dept: FAMILY MEDICINE CLINIC | Facility: CLINIC | Age: 64
End: 2024-08-14

## 2024-08-14 VITALS
SYSTOLIC BLOOD PRESSURE: 128 MMHG | WEIGHT: 135.6 LBS | OXYGEN SATURATION: 95 % | TEMPERATURE: 97.7 F | HEART RATE: 65 BPM | RESPIRATION RATE: 16 BRPM | BODY MASS INDEX: 24.03 KG/M2 | DIASTOLIC BLOOD PRESSURE: 72 MMHG | HEIGHT: 63 IN

## 2024-08-14 DIAGNOSIS — F41.1 GENERALIZED ANXIETY DISORDER: ICD-10-CM

## 2024-08-14 DIAGNOSIS — E55.9 VITAMIN D DEFICIENCY: ICD-10-CM

## 2024-08-14 DIAGNOSIS — E89.0 POSTSURGICAL HYPOTHYROIDISM: ICD-10-CM

## 2024-08-14 DIAGNOSIS — K21.9 GASTROESOPHAGEAL REFLUX DISEASE WITHOUT ESOPHAGITIS: ICD-10-CM

## 2024-08-14 DIAGNOSIS — B00.1 FEVER BLISTER: ICD-10-CM

## 2024-08-14 DIAGNOSIS — E78.2 MIXED HYPERLIPIDEMIA: ICD-10-CM

## 2024-08-14 DIAGNOSIS — I51.81 TAKOTSUBO CARDIOMYOPATHY: Primary | ICD-10-CM

## 2024-08-14 DIAGNOSIS — F32.5 DEPRESSION, MAJOR, IN REMISSION: ICD-10-CM

## 2024-08-14 DIAGNOSIS — E66.9 OBESITY (BMI 30-39.9): ICD-10-CM

## 2024-08-14 DIAGNOSIS — I51.81 TAKOTSUBO CARDIOMYOPATHY: ICD-10-CM

## 2024-08-14 DIAGNOSIS — I10 HYPERTENSION, ESSENTIAL, BENIGN: Primary | ICD-10-CM

## 2024-08-14 DIAGNOSIS — R73.01 IMPAIRED FASTING GLUCOSE: ICD-10-CM

## 2024-08-14 PROCEDURE — 99214 OFFICE O/P EST MOD 30 MIN: CPT | Performed by: PHYSICIAN ASSISTANT

## 2024-08-14 PROCEDURE — 93798 PHYS/QHP OP CAR RHAB W/ECG: CPT

## 2024-08-14 RX ORDER — FLUOXETINE HYDROCHLORIDE 40 MG/1
80 CAPSULE ORAL DAILY
Qty: 180 CAPSULE | Refills: 3 | Status: SHIPPED | OUTPATIENT
Start: 2024-08-14

## 2024-08-14 RX ORDER — NITROFURANTOIN 25; 75 MG/1; MG/1
100 CAPSULE ORAL DAILY
Qty: 30 CAPSULE | Refills: 5 | Status: SHIPPED | OUTPATIENT
Start: 2024-08-14

## 2024-08-14 RX ORDER — BUPROPION HYDROCHLORIDE 300 MG/1
300 TABLET ORAL EVERY MORNING
Qty: 90 TABLET | Refills: 3 | Status: SHIPPED | OUTPATIENT
Start: 2024-08-14

## 2024-08-14 RX ORDER — MONTELUKAST SODIUM 10 MG/1
10 TABLET ORAL NIGHTLY
Qty: 90 TABLET | Refills: 3 | Status: SHIPPED | OUTPATIENT
Start: 2024-08-14

## 2024-08-14 RX ORDER — VALACYCLOVIR HYDROCHLORIDE 1 G/1
TABLET, FILM COATED ORAL
Qty: 20 TABLET | Refills: 5 | Status: SHIPPED | OUTPATIENT
Start: 2024-08-14

## 2024-08-14 RX ORDER — ONDANSETRON 4 MG/1
TABLET, FILM COATED ORAL
Qty: 20 TABLET | Refills: 0 | Status: SHIPPED | OUTPATIENT
Start: 2024-08-14

## 2024-08-14 RX ORDER — SEMAGLUTIDE 1 MG/.5ML
1 INJECTION, SOLUTION SUBCUTANEOUS WEEKLY
Qty: 2 ML | Refills: 11 | Status: SHIPPED | OUTPATIENT
Start: 2024-08-14

## 2024-08-14 RX ORDER — FLUCONAZOLE 150 MG/1
150 TABLET ORAL ONCE
Qty: 1 TABLET | Refills: 11 | Status: SHIPPED | OUTPATIENT
Start: 2024-08-14 | End: 2024-08-14

## 2024-08-14 RX ORDER — LEVOTHYROXINE, LIOTHYRONINE 9.5; 2.25 UG/1; UG/1
TABLET ORAL
COMMUNITY
Start: 2024-08-11

## 2024-08-14 RX ORDER — METOPROLOL SUCCINATE 25 MG/1
25 TABLET, EXTENDED RELEASE ORAL
Qty: 90 TABLET | Refills: 3 | Status: SHIPPED | OUTPATIENT
Start: 2024-08-14

## 2024-08-14 RX ORDER — METFORMIN HYDROCHLORIDE 500 MG/1
TABLET, EXTENDED RELEASE ORAL
Qty: 90 TABLET | Refills: 3 | Status: SHIPPED | OUTPATIENT
Start: 2024-08-14

## 2024-08-14 NOTE — TELEPHONE ENCOUNTER
From: Arlene Quesada  To: Dasia Glez  Sent: 8/14/2024 3:12 PM EDT  Subject: Zofran    Would you send me a Rx for Zofran. I tend to get nautious sometimes when taking Wegovy, especially now that i am going to increase to once a week shot.  Thank you!

## 2024-08-14 NOTE — PROGRESS NOTES
"Subjective   Arlene Quesada is a 64 y.o. female.     History of Present Illness    Since the last visit, she has overall felt well.  She has Primary Hypertension and well controlled on current medication, Impaired fasting glucose and will monitor labs to watch for DMII, GERD controlled on PPI Rx, Hyperlipidemia and working on this with diet and exercise, Hypothyroidism and remains under the care of their specialist, and Vitamin D deficiency and labs are at goal >30 ng/mL.  she has been compliant with current medications have reviewed them.  The patient denies medication side effects.  Will refill medications. /72   Pulse 65   Temp 97.7 °F (36.5 °C)   Resp 16   Ht 160 cm (62.99\")   Wt 61.5 kg (135 lb 9.6 oz)   LMP  (LMP Unknown)   SpO2 95%   BMI 24.03 kg/m² .  BMI is within normal parameters. No other follow-up for BMI required.      Post surgical hypothyroidism---only had 1/2 thyroid removed---not cancer----recent dose change with DR Schmid  Our last visit was hospital follow-up on 5/7/2024.  Reviewed notes from cardiology Dr. Phillips for follow-up on 6/6/2024--- noting diagnosis of nonischemic cardiomyopathy, non-STEMI Takotsubo cardiomyopathy diagnosed at this last hospitalization..... Noted patient was having shortness of breath on exertion.  In office EKG sinus rhythm similar to prior EKG.  Plan  1.  Takotsubo cardiomyopathy.  EF of 33 % on metoprolol succinate.  Blood pressures are too low for any other guideline directed management.    Suspect her LV function has recovered.  Will plan on a repeat echocardiogram.  2.  Hypertension.  Well-controlled on current regimen medications.  Continue the same.  3.  Hypothyroidism  4.  Status post non-STEMI.  Due to #1.  Patient is interested in proceeding with cardiac rehab.  Her prior insurance would not cover this.  She reports that her current insurance will cover cardiac rehab.  Another referral will be made.  4.  Diabetes mellitus type 2  I will call and " "discuss results of her echocardiogram.  Will tentatively plan on seeing the patient back again in 3 month    Had echocardiogram 6/19/2024 noted EF was up to 55.7%  Results for orders placed or performed in visit on 07/30/24   TSH    Specimen: Blood   Result Value Ref Range    TSH 0.496 0.270 - 4.200 uIU/mL   T4, Free    Specimen: Blood   Result Value Ref Range    Free T4 0.89 (L) 0.92 - 1.68 ng/dL   T3    Specimen: Blood   Result Value Ref Range    T3, Total 114.0 80.0 - 200.0 ng/dl     Had follow-up visit with hematology Dr. Haynes 7-24 noting her recent hospitalization.  Noting previous workup for coagulation disorders due to bruising was negative and noted her bruising from a combination of weight loss from Wegovy and ongoing high doses of Prozac.  He noted \"that patient is not felt to have a coagulation disorder.\"  F/u PRN  Saw Dr Rose---had GI work up  ----- Message from Silver Rose MD sent at 12/18/2023  2:34 PM EST -----  Biopsies normal, no abnormalities of the esophagus itself.  If ongoing symptoms consider for speech video swallow      She has GERD on Nexium--- she saw Dr. Tanner on 4/6/2023 and has esophagram scheduled--- for breakthrough GERD   Has PRN Macrobid for UTI--works  VAltrex works PRN fever blister  Sees eye doc----glaucoma stable   Arlene Quesada female 64 y.o., /72   Pulse 65   Temp 97.7 °F (36.5 °C)   Resp 16   Ht 160 cm (62.99\")   Wt 61.5 kg (135 lb 9.6 oz)   LMP  (LMP Unknown)   SpO2 95%   BMI 24.03 kg/m²   who presents today for follow up of Depression and Anxiety.  She reports medication is working well, patient desires to continue on Rx, and needs refill. Onset of symptoms was approximately several years ago. She denies current suicidal and homicidal ideation. Risk factors are family history of anxiety and or depression and lifestyle of multiple roles.  Previous treatment includes current Rx. She complains of the following medication side effects:none. The patient " has previously been in counseling..    The following portions of the patient's history were reviewed and updated as appropriate: allergies, current medications, past family history, past medical history, past social history, past surgical history, and problem list.    Review of Systems   Constitutional:  Negative for diaphoresis.   HENT:  Negative for nosebleeds and trouble swallowing.    Eyes:  Negative for blurred vision and visual disturbance.   Respiratory:  Negative for choking.    Gastrointestinal:  Negative for blood in stool.   Musculoskeletal:  Positive for neck pain.   Allergic/Immunologic: Negative for immunocompromised state.   Neurological:  Negative for facial asymmetry and speech difficulty.   Psychiatric/Behavioral:  Negative for self-injury and suicidal ideas.        Objective   Physical Exam  Vitals and nursing note reviewed.   Constitutional:       General: She is not in acute distress.     Appearance: Normal appearance. She is well-developed. She is not ill-appearing or toxic-appearing.   HENT:      Head: Normocephalic.      Right Ear: External ear normal.      Left Ear: External ear normal.      Nose: Nose normal.      Mouth/Throat:      Pharynx: Oropharynx is clear.   Eyes:      General: No scleral icterus.     Conjunctiva/sclera: Conjunctivae normal.      Pupils: Pupils are equal, round, and reactive to light.   Neck:      Thyroid: No thyromegaly.   Cardiovascular:      Rate and Rhythm: Normal rate and regular rhythm.      Pulses: Normal pulses.      Heart sounds: Normal heart sounds. No murmur heard.  Pulmonary:      Effort: Pulmonary effort is normal. No respiratory distress.      Breath sounds: Normal breath sounds.   Musculoskeletal:         General: No deformity. Normal range of motion.      Cervical back: Normal range of motion and neck supple.      Right lower leg: No edema.      Left lower leg: No edema.   Skin:     General: Skin is warm and dry.      Findings: No rash.   Neurological:       General: No focal deficit present.      Mental Status: She is alert and oriented to person, place, and time. Mental status is at baseline.   Psychiatric:         Mood and Affect: Mood normal.         Behavior: Behavior normal.         Thought Content: Thought content normal.         Judgment: Judgment normal.           Assessment & Plan   Diagnoses and all orders for this visit:    1. Hypertension, essential, benign (Primary)    2. Mixed hyperlipidemia    3. Postsurgical hypothyroidism    4. Impaired fasting glucose    5. Gastroesophageal reflux disease without esophagitis    6. Generalized anxiety disorder    7. Recurrent HSV (herpes simplex virus)    8. Depression, major, in remission    9. Takotsubo cardiomyopathy  Comments:  sees cardio    10. Obesity (BMI 30-39.9)    11. Vitamin D deficiency    Other orders  -     Semaglutide-Weight Management (Wegovy) 1 MG/0.5ML solution auto-injector; Inject 0.5 mL under the skin into the appropriate area as directed 1 (One) Time Per Week. 1mg SQ once weekly for obesity  Dispense: 2 mL; Refill: 11        Valtrex works PRN fever blister  Sees eye doc----glaucoma stable   Stable on Wellbutrin XL and Prozac for anxiety/depression  Dr Schmid --thyroid;--right side thyroidectomy---monitors labs  Continue semaglutide for treatment of obesity and working well at 1 mg and will send to compound pharmacy and sign contract   Plan, Arlene Quesada, was seen today.  she was seen for HTN and continue medication, Imparied fasting glucose and plan follow up labs, diet, and exercise, GERD and will continue on PPI medication, Hyperlipidemia and will work on this with diet and exercise, Hypothyroidism and under the care of specialist, and Vitamin D deficiency and supplemented.  GYN teodora knapp  Has follow-up with cardiology for Takotsubo cardiomyopathy  Continue semaglutide working well for treatment of obesity  Continues on metformin for treatment of impaired fasting glucose working well

## 2024-08-14 NOTE — PATIENT INSTRUCTIONS
Generalized Anxiety Disorder, Adult  Generalized anxiety disorder (LASHELL) is a mental health condition. Unlike normal worries, anxiety related to LASHELL is not triggered by a specific event. These worries do not fade or get better with time. LASHELL interferes with relationships, work, and school.  LASHELL symptoms can vary from mild to severe. People with severe LASHELL can have intense waves of anxiety with physical symptoms that are similar to panic attacks.  What are the causes?  The exact cause of LASHELL is not known, but the following are believed to have an impact:  Differences in natural brain chemicals.  Genes passed down from parents to children.  Differences in the way threats are perceived.  Development and stress during childhood.  Personality.  What increases the risk?  The following factors may make you more likely to develop this condition:  Being female.  Having a family history of anxiety disorders.  Being very shy.  Experiencing very stressful life events, such as the death of a loved one.  Having a very stressful family environment.  What are the signs or symptoms?  People with LASHELL often worry excessively about many things in their lives, such as their health and family. Symptoms may also include:  Mental and emotional symptoms:  Worrying excessively about natural disasters.  Fear of being late.  Difficulty concentrating.  Fears that others are judging your performance.  Physical symptoms:  Fatigue.  Headaches, muscle tension, muscle twitches, trembling, or feeling shaky.  Feeling like your heart is pounding or beating very fast.  Feeling out of breath or like you cannot take a deep breath.  Having trouble falling asleep or staying asleep, or experiencing restlessness.  Sweating.  Nausea, diarrhea, or irritable bowel syndrome (IBS).  Behavioral symptoms:  Experiencing erratic moods or irritability.  Avoidance of new situations.  Avoidance of people.  Extreme difficulty making decisions.  How is this diagnosed?  This  condition is diagnosed based on your symptoms and medical history. You will also have a physical exam. Your health care provider may perform tests to rule out other possible causes of your symptoms.  To be diagnosed with LASHELL, a person must have anxiety that:  Is out of his or her control.  Affects several different aspects of his or her life, such as work and relationships.  Causes distress that makes him or her unable to take part in normal activities.  Includes at least three symptoms of LASHELL, such as restlessness, fatigue, trouble concentrating, irritability, muscle tension, or sleep problems.  Before your health care provider can confirm a diagnosis of LASHELL, these symptoms must be present more days than they are not, and they must last for 6 months or longer.  How is this treated?  This condition may be treated with:  Medicine. Antidepressant medicine is usually prescribed for long-term daily control. Anti-anxiety medicines may be added in severe cases, especially when panic attacks occur.  Talk therapy (psychotherapy). Certain types of talk therapy can be helpful in treating LASHELL by providing support, education, and guidance. Options include:  Cognitive behavioral therapy (CBT). People learn coping skills and self-calming techniques to ease their physical symptoms. They learn to identify unrealistic thoughts and behaviors and to replace them with more appropriate thoughts and behaviors.  Acceptance and commitment therapy (ACT). This treatment teaches people how to be mindful as a way to cope with unwanted thoughts and feelings.  Biofeedback. This process trains you to manage your body's response (physiological response) through breathing techniques and relaxation methods. You will work with a therapist while machines are used to monitor your physical symptoms.  Stress management techniques. These include yoga, meditation, and exercise.  A mental health specialist can help determine which treatment is best for you.  Some people see improvement with one type of therapy. However, other people require a combination of therapies.  Follow these instructions at home:  Lifestyle  Maintain a consistent routine and schedule.  Anticipate stressful situations. Create a plan and allow extra time to work with your plan.  Practice stress management or self-calming techniques that you have learned from your therapist or your health care provider.  Exercise regularly and spend time outdoors.  Eat a healthy diet that includes plenty of vegetables, fruits, whole grains, low-fat dairy products, and lean protein.  Do not eat a lot of foods that are high in fat, added sugar, or salt (sodium).  Drink plenty of water.  Avoid alcohol. Alcohol can increase anxiety.  Avoid caffeine and certain over-the-counter cold medicines. These may make you feel worse. Ask your pharmacist which medicines to avoid.  General instructions  Take over-the-counter and prescription medicines only as told by your health care provider.  Understand that you are likely to have setbacks. Accept this and be kind to yourself as you persist to take better care of yourself.  Anticipate stressful situations. Create a plan and allow extra time to work with your plan.  Recognize and accept your accomplishments, even if you  them as small.  Spend time with people who care about you.  Keep all follow-up visits. This is important.  Where to find more information  National [place] of Mental Health: www.nimh.nih.gov  Substance Abuse and Mental Health Services: www.samhsa.gov  Contact a health care provider if:  Your symptoms do not get better.  Your symptoms get worse.  You have signs of depression, such as:  A persistently sad or irritable mood.  Loss of enjoyment in activities that used to bring you brandon.  Change in weight or eating.  Changes in sleeping habits.  Get help right away if:  You have thoughts about hurting yourself or others.  If you ever feel like you may hurt  yourself or others, or have thoughts about taking your own life, get help right away. Go to your nearest emergency department or:  Call your local emergency services (041 in the [place]  Call a suicide crisis helpline, such as the National Suicide Prevention Lifeline at 1-513.834.6560 or 186 in the [place] This is open 24 hours a day in the [place]  Text the Crisis Text Line at 099782 (in the [place]  Summary  Generalized anxiety disorder (LASHELL) is a mental health condition that involves worry that is not triggered by a specific event.  People with LASHELL often worry excessively about many things in their lives, such as their health and family.  LASHELL may cause symptoms such as restlessness, trouble concentrating, sleep problems, frequent sweating, nausea, diarrhea, headaches, and trembling or muscle twitching.  A mental health specialist can help determine which treatment is best for you. Some people see improvement with one type of therapy. However, other people require a combination of therapies.  This information is not intended to replace advice given to you by your health care provider. Make sure you discuss any questions you have with your health care provider.  Document Revised: 07/13/2022 Document Reviewed: 04/10/2022  Elsevier Patient Education © 2024 [employer]

## 2024-08-16 ENCOUNTER — APPOINTMENT (OUTPATIENT)
Dept: CARDIAC REHAB | Facility: HOSPITAL | Age: 64
End: 2024-08-16
Payer: COMMERCIAL

## 2024-08-19 ENCOUNTER — TREATMENT (OUTPATIENT)
Dept: CARDIAC REHAB | Facility: HOSPITAL | Age: 64
End: 2024-08-19
Payer: COMMERCIAL

## 2024-08-19 DIAGNOSIS — I51.81 TAKOTSUBO CARDIOMYOPATHY: Primary | ICD-10-CM

## 2024-08-19 PROCEDURE — 93798 PHYS/QHP OP CAR RHAB W/ECG: CPT

## 2024-08-21 ENCOUNTER — APPOINTMENT (OUTPATIENT)
Dept: CARDIAC REHAB | Facility: HOSPITAL | Age: 64
End: 2024-08-21
Payer: COMMERCIAL

## 2024-08-23 ENCOUNTER — APPOINTMENT (OUTPATIENT)
Dept: CARDIAC REHAB | Facility: HOSPITAL | Age: 64
End: 2024-08-23
Payer: COMMERCIAL

## 2024-08-26 ENCOUNTER — APPOINTMENT (OUTPATIENT)
Dept: CARDIAC REHAB | Facility: HOSPITAL | Age: 64
End: 2024-08-26
Payer: COMMERCIAL

## 2024-08-28 ENCOUNTER — TREATMENT (OUTPATIENT)
Dept: CARDIAC REHAB | Facility: HOSPITAL | Age: 64
End: 2024-08-28
Payer: COMMERCIAL

## 2024-08-28 DIAGNOSIS — I51.81 TAKOTSUBO CARDIOMYOPATHY: Primary | ICD-10-CM

## 2024-08-28 PROCEDURE — 93798 PHYS/QHP OP CAR RHAB W/ECG: CPT

## 2024-08-30 ENCOUNTER — APPOINTMENT (OUTPATIENT)
Dept: CARDIAC REHAB | Facility: HOSPITAL | Age: 64
End: 2024-08-30
Payer: COMMERCIAL

## 2024-09-04 ENCOUNTER — TREATMENT (OUTPATIENT)
Dept: CARDIAC REHAB | Facility: HOSPITAL | Age: 64
End: 2024-09-04
Payer: COMMERCIAL

## 2024-09-04 DIAGNOSIS — I51.81 TAKOTSUBO CARDIOMYOPATHY: Primary | ICD-10-CM

## 2024-09-04 PROCEDURE — 93798 PHYS/QHP OP CAR RHAB W/ECG: CPT

## 2024-09-06 ENCOUNTER — APPOINTMENT (OUTPATIENT)
Dept: CARDIAC REHAB | Facility: HOSPITAL | Age: 64
End: 2024-09-06
Payer: COMMERCIAL

## 2024-09-11 ENCOUNTER — TREATMENT (OUTPATIENT)
Dept: CARDIAC REHAB | Facility: HOSPITAL | Age: 64
End: 2024-09-11
Payer: COMMERCIAL

## 2024-09-11 DIAGNOSIS — I21.4 NON-STEMI (NON-ST ELEVATED MYOCARDIAL INFARCTION): Primary | ICD-10-CM

## 2024-09-11 PROCEDURE — 93798 PHYS/QHP OP CAR RHAB W/ECG: CPT

## 2024-09-13 ENCOUNTER — APPOINTMENT (OUTPATIENT)
Dept: CARDIAC REHAB | Facility: HOSPITAL | Age: 64
End: 2024-09-13
Payer: COMMERCIAL

## 2024-09-16 ENCOUNTER — APPOINTMENT (OUTPATIENT)
Dept: CARDIAC REHAB | Facility: HOSPITAL | Age: 64
End: 2024-09-16
Payer: COMMERCIAL

## 2024-09-16 ENCOUNTER — TREATMENT (OUTPATIENT)
Dept: CARDIAC REHAB | Facility: HOSPITAL | Age: 64
End: 2024-09-16
Payer: COMMERCIAL

## 2024-09-16 DIAGNOSIS — I21.4 NON-STEMI (NON-ST ELEVATED MYOCARDIAL INFARCTION): Primary | ICD-10-CM

## 2024-09-16 PROCEDURE — 93798 PHYS/QHP OP CAR RHAB W/ECG: CPT

## 2024-09-20 ENCOUNTER — APPOINTMENT (OUTPATIENT)
Dept: CARDIAC REHAB | Facility: HOSPITAL | Age: 64
End: 2024-09-20
Payer: COMMERCIAL

## 2024-09-23 ENCOUNTER — APPOINTMENT (OUTPATIENT)
Dept: CARDIAC REHAB | Facility: HOSPITAL | Age: 64
End: 2024-09-23
Payer: COMMERCIAL

## 2024-09-25 ENCOUNTER — TREATMENT (OUTPATIENT)
Dept: CARDIAC REHAB | Facility: HOSPITAL | Age: 64
End: 2024-09-25
Payer: COMMERCIAL

## 2024-09-25 DIAGNOSIS — I21.4 NON-STEMI (NON-ST ELEVATED MYOCARDIAL INFARCTION): Primary | ICD-10-CM

## 2024-09-25 PROCEDURE — 93798 PHYS/QHP OP CAR RHAB W/ECG: CPT

## 2024-09-27 ENCOUNTER — APPOINTMENT (OUTPATIENT)
Dept: CARDIAC REHAB | Facility: HOSPITAL | Age: 64
End: 2024-09-27
Payer: COMMERCIAL

## 2024-09-30 ENCOUNTER — TREATMENT (OUTPATIENT)
Dept: CARDIAC REHAB | Facility: HOSPITAL | Age: 64
End: 2024-09-30
Payer: COMMERCIAL

## 2024-09-30 DIAGNOSIS — I21.4 NON-STEMI (NON-ST ELEVATED MYOCARDIAL INFARCTION): Primary | ICD-10-CM

## 2024-09-30 PROCEDURE — 93798 PHYS/QHP OP CAR RHAB W/ECG: CPT

## 2024-10-02 ENCOUNTER — APPOINTMENT (OUTPATIENT)
Dept: CARDIAC REHAB | Facility: HOSPITAL | Age: 64
End: 2024-10-02
Payer: COMMERCIAL

## 2024-10-04 ENCOUNTER — TREATMENT (OUTPATIENT)
Dept: CARDIAC REHAB | Facility: HOSPITAL | Age: 64
End: 2024-10-04
Payer: COMMERCIAL

## 2024-10-04 DIAGNOSIS — I21.4 NON-STEMI (NON-ST ELEVATED MYOCARDIAL INFARCTION): Primary | ICD-10-CM

## 2024-10-04 PROCEDURE — 93798 PHYS/QHP OP CAR RHAB W/ECG: CPT

## 2024-10-07 ENCOUNTER — TREATMENT (OUTPATIENT)
Dept: CARDIAC REHAB | Facility: HOSPITAL | Age: 64
End: 2024-10-07
Payer: COMMERCIAL

## 2024-10-07 DIAGNOSIS — I21.4 NON-STEMI (NON-ST ELEVATED MYOCARDIAL INFARCTION): Primary | ICD-10-CM

## 2024-10-07 PROCEDURE — 93798 PHYS/QHP OP CAR RHAB W/ECG: CPT

## 2024-10-07 PROCEDURE — 93797 PHYS/QHP OP CAR RHAB WO ECG: CPT

## 2024-10-09 ENCOUNTER — TREATMENT (OUTPATIENT)
Dept: CARDIAC REHAB | Facility: HOSPITAL | Age: 64
End: 2024-10-09
Payer: COMMERCIAL

## 2024-10-09 DIAGNOSIS — I21.4 NON-STEMI (NON-ST ELEVATED MYOCARDIAL INFARCTION): Primary | ICD-10-CM

## 2024-10-09 PROCEDURE — 93798 PHYS/QHP OP CAR RHAB W/ECG: CPT

## 2024-10-11 ENCOUNTER — APPOINTMENT (OUTPATIENT)
Dept: CARDIAC REHAB | Facility: HOSPITAL | Age: 64
End: 2024-10-11
Payer: COMMERCIAL

## 2024-10-14 ENCOUNTER — TREATMENT (OUTPATIENT)
Dept: CARDIAC REHAB | Facility: HOSPITAL | Age: 64
End: 2024-10-14
Payer: COMMERCIAL

## 2024-10-14 DIAGNOSIS — I21.4 NON-STEMI (NON-ST ELEVATED MYOCARDIAL INFARCTION): Primary | ICD-10-CM

## 2024-10-14 PROCEDURE — 93798 PHYS/QHP OP CAR RHAB W/ECG: CPT

## 2024-10-16 ENCOUNTER — TREATMENT (OUTPATIENT)
Dept: CARDIAC REHAB | Facility: HOSPITAL | Age: 64
End: 2024-10-16
Payer: COMMERCIAL

## 2024-10-16 DIAGNOSIS — I21.4 NON-STEMI (NON-ST ELEVATED MYOCARDIAL INFARCTION): Primary | ICD-10-CM

## 2024-10-16 PROCEDURE — 93798 PHYS/QHP OP CAR RHAB W/ECG: CPT

## 2024-10-18 ENCOUNTER — APPOINTMENT (OUTPATIENT)
Dept: CARDIAC REHAB | Facility: HOSPITAL | Age: 64
End: 2024-10-18
Payer: COMMERCIAL

## 2024-10-18 ENCOUNTER — OFFICE VISIT (OUTPATIENT)
Dept: CARDIOLOGY | Facility: CLINIC | Age: 64
End: 2024-10-18
Payer: COMMERCIAL

## 2024-10-18 VITALS
BODY MASS INDEX: 24.84 KG/M2 | SYSTOLIC BLOOD PRESSURE: 126 MMHG | HEART RATE: 74 BPM | DIASTOLIC BLOOD PRESSURE: 70 MMHG | HEIGHT: 62 IN | WEIGHT: 135 LBS

## 2024-10-18 DIAGNOSIS — I51.81 STRESS-INDUCED CARDIOMYOPATHY: Primary | ICD-10-CM

## 2024-10-18 NOTE — PROGRESS NOTES
Subjective:     Encounter Date:10/18/2024      Patient ID: Arlene Quesada is a 64 y.o. female.    Chief Complaint:  History of Present Illness  This is a 64-year-old female with a past medical history of anxiety, depression, GERD, hypothyroidism, arthritis, hypertension, hyperlipidemia.      She is here today for a follow-up visit.  She has been doing very well since she was last seen.  She is attending cardiac rehab twice a week and is up to 25 minutes of exercise when she goes.  She denies any chest pain, shortness of breath, palpitations, dizziness or syncope.  No swelling in her legs, orthopnea or PND.  She is getting ready to undergo a couple of surgeries on her eyes for cataracts and glaucoma.  And then in January she is supposed to be undergoing surgery on her neck from C3-C6.    Prior history:  She presented to the emergency room on 4/21/2024 with complaints of sudden onset substernal chest discomfort that radiated to the left side of her chest and was associated with left sided arm numbness and shortness of breath.  She reported that the discomfort was similar to what she typically experiences with GERD.  Usually she will take a PPI in times which will relieve her discomfort.  However with this particular episode the discomfort was much more severe and radiated to the left chest.  She tried taking Tums without any relief.  She also reported that she was belching a lot and had left arm numbness and shortness of breath.     In the emergency room at [facility] her EKG was unremarkable and her vital signs were within normal limits.  Her initial troponin was elevated at 207 with a repeat of 411.  She was transferred to [facility] for further evaluation.     A cardiac catheterization was performed that showed no significant atherosclerotic coronary disease.  There is normal contractility of the left ventricular basal segments and apex.  The remaining left ventricular wall segments were  akinetic to dyskinetic.  The pattern of the left ventricular wall motion abnormalities was consistent with Takotsubo cardiomyopathy.  Left ventricular systolic function appeared to be severely depressed with an EF of 25 to 30%.An echocardiogram was also obtained that showed moderately decreased LV systolic function with an EF of 33.2% and wall motion abnormalities suspicious for stress-induced cardiomyopathy.  She was started on guideline directed medical therapy with Toprol-XL.  She was previously on benazepril and amlodipine.  Amlodipine was stopped in light of her reduced heart function and benazepril was placed on hold for relatively low blood pressures.    I saw her in April and she was doing well.  No changes were made.  He then followed up with Dr. Phillips in June and continued to feel well.  A repeat echocardiogram was obtained that showed realization of her LV function.     I have reviewed and updated as appropriate allergies, current medications, past family history, past medical history, past surgical history and problem list.    Review of Systems   Constitutional: Negative for fever, malaise/fatigue, weight gain and weight loss.   HENT:  Negative for congestion, hoarse voice and sore throat.    Eyes:  Negative for blurred vision and double vision.   Cardiovascular:  Negative for chest pain, dyspnea on exertion, leg swelling, orthopnea, palpitations and syncope.   Respiratory:  Negative for cough, shortness of breath and wheezing.    Gastrointestinal:  Negative for abdominal pain, hematemesis, hematochezia and melena.   Genitourinary:  Negative for dysuria and hematuria.   Neurological:  Negative for dizziness, headaches, light-headedness and numbness.   Psychiatric/Behavioral:  Negative for depression. The patient is not nervous/anxious.          Current Outpatient Medications:     albuterol sulfate  (90 Base) MCG/ACT inhaler, Inhale 2 puffs Every 4 (Four) Hours As Needed., Disp: , Rfl:      bimatoprost (LUMIGAN) 0.01 % ophthalmic drops, 1 drop Every Night. Instill 1 in affected eye once a day (at bedtime), Disp: , Rfl:     buPROPion XL (WELLBUTRIN XL) 300 MG 24 hr tablet, Take 1 tablet by mouth Every Morning. For mood, Disp: 90 tablet, Rfl: 3    Cholecalciferol (VITAMIN D3) 2000 UNITS tablet, Take by mouth. Take 1 capsule by oral route daily for 90 days, Disp: , Rfl:     esomeprazole (nexIUM) 40 MG capsule, Take 1 capsule by mouth Every Morning Before Breakfast., Disp: , Rfl:     FLUoxetine (PROzac) 40 MG capsule, Take 2 capsules by mouth Daily. For depression and anxiety, Disp: 180 capsule, Rfl: 3    Insulin Pen Needle 32G X 4 MM misc, For once daily use with daily injection, Disp: 100 each, Rfl: 3    LORazepam (ATIVAN) 0.5 MG tablet, , Disp: , Rfl:     metaxalone (SKELAXIN) 800 MG tablet, Take 1 tablet by mouth 2 (Two) Times a Day As Needed for Muscle Spasms., Disp: 60 tablet, Rfl: 1    metFORMIN ER (GLUCOPHAGE-XR) 500 MG 24 hr tablet, TAKE 1 TABLET DAILY for impaired fasting glucose, Disp: 90 tablet, Rfl: 3    metoprolol succinate XL (TOPROL-XL) 25 MG 24 hr tablet, Take 1 tablet by mouth Daily. For heart, Disp: 90 tablet, Rfl: 3    montelukast (SINGULAIR) 10 MG tablet, Take 1 tablet by mouth Every Night. For allergies, Disp: 90 tablet, Rfl: 3    mupirocin (Bactroban) 2 % ointment, Apply  topically to the appropriate area as directed 3 (Three) Times a Day. To wound area until healed, Disp: 30 each, Rfl: 1    nitrofurantoin, macrocrystal-monohydrate, (MACROBID) 100 MG capsule, Take 1 capsule by mouth Daily. For prophylaxis PRN, Disp: 30 capsule, Rfl: 5    NP Thyroid 15 MG tablet, take 1 tablet by mouth every day in the morning on an empty stomach, Disp: , Rfl:     NP Thyroid 60 MG tablet, Take 1 tablet by mouth., Disp: , Rfl:     ondansetron (Zofran) 4 MG tablet, 1-2 tabs PO Q 8 hours PRN nausea, Disp: 20 tablet, Rfl: 0    Rocklatan 0.02-0.005 % solution, , Disp: , Rfl:     Semaglutide-Weight  Management (Wegovy) 1 MG/0.5ML solution auto-injector, Inject 0.5 mL under the skin into the appropriate area as directed 1 (One) Time Per Week. 1mg SQ once weekly for obesity, Disp: 2 mL, Rfl: 11    terconazole (TERAZOL 3) 0.8 % vaginal cream, Insert 1 applicator into the vagina Every Night. X 3 days for yeast, Disp: 20 g, Rfl: 5    Testosterone Propionate (FIRST-TESTOSTERONE) 2 % ointment, Place  on the skin as directed by provider., Disp: , Rfl:     timolol (TIMOPTIC) 0.5 % ophthalmic solution, , Disp: , Rfl:     valACYclovir (VALTREX) 1000 MG tablet, TAKE 2 TABLETS BY MOUTH AT ONSET FOR FEVER BLISTER. REPEAT THIS DOSE 1 TIME IN 12 HOURS, Disp: 20 tablet, Rfl: 5    XIIDRA 5 % solution, , Disp: , Rfl:     Past Medical History:   Diagnosis Date    Anemia, iron deficiency     Arthritis     Depression     Gastroesophageal reflux     Glaucoma     History of complete eye exam 59557    [facility]: early glaucoma    History of EKG 03/2006    borderline QT prolonged, NSR    History of MRI of cervical spine 03/29/2011    Multilevel DDD with multilevel spinal steonsis, most severe at C5-6 and C6-7, less prominent at C3-4 and C4-5, there is neuroforaminimal compromise on the right at multiple levels    History of MRI of lumbar spine 03/29/2011    Multilevel DDD most prominent at L4-5 and L5-S1, moderate facet DDD, protrusion in L4-5 and mild disc bulge at L5-S1    Hyperlipemia     Hypertension, essential, benign     Low back pain     Nausea 01/23/2015    Reflux esophagitis     Thyroid disorder     Thyroid nodule        Past Surgical History:   Procedure Laterality Date    APPENDECTOMY      BREAST BIOPSY  09/26/2013    left - benign fibroadenoma    CARDIAC CATHETERIZATION N/A 4/21/2024    Procedure: Left Heart Cath;  Surgeon: Marjorie Phillips MD;  Location: [facility] CATH INVASIVE LOCATION;  Service: Cardiology;  Laterality: N/A;    CARDIAC CATHETERIZATION N/A 4/21/2024    Procedure: Coronary angiography;  Surgeon: Alan  "MD Marjorie;  Location:  [facility] CATH INVASIVE LOCATION;  Service: Cardiology;  Laterality: N/A;    CARDIAC CATHETERIZATION N/A 2024    Procedure: Left ventriculography;  Surgeon: Marjorie Phillips MD;  Location:  [facility] CATH INVASIVE LOCATION;  Service: Cardiology;  Laterality: N/A;    CHOLECYSTECTOMY      COLONOSCOPY      COLPOSCOPY      ENDOMETRIAL ABLATION      ENDOSCOPY  2006    Dr. Ruiz: clean based gastric ulcer    ENDOSCOPY N/A 2023    Procedure: ESOPHAGOGASTRODUODENOSCOPY with cold biopsies;  Surgeon: Silver Rose MD;  Location:  [facility];  Service: Gastroenterology;  Laterality: N/A;  Pre: dysphagia  Post: normal    HYSTERECTOMY  10/2010    THYROIDECTOMY, PARTIAL Right     TRABECULECTOMY Bilateral     UPPER GASTROINTESTINAL ENDOSCOPY         Family History   Problem Relation Age of Onset    Breast cancer Mother     Kidney cancer Mother     Hypertension Father     Diabetes Brother        Social History     Tobacco Use    Smoking status: Former     Current packs/day: 0.00     Types: Cigarettes     Quit date:      Years since quittin.8     Passive exposure: Never    Smokeless tobacco: Never    Tobacco comments:     Started at age 19/Stopped at age 45   Vaping Use    Vaping status: Never Used   Substance Use Topics    Alcohol use: Yes     Comment: rare    Drug use: No         ECG 12 Lead    Date/Time: 10/18/2024 3:35 PM  Performed by: Carmen Shipley APRN    Authorized by: Carmen Shipley APRN  Comparison: compared with previous ECG from 2024  Similar to previous ECG  Rhythm: sinus rhythm             Objective:     Visit Vitals  /70 (BP Location: Right arm, Patient Position: Sitting, Cuff Size: Adult)   Pulse 74   Ht 157.5 cm (62\")   Wt 61.2 kg (135 lb)   LMP  (LMP Unknown)   BMI 24.69 kg/m²             Physical Exam  Constitutional:       Appearance: Normal appearance. She is normal weight.   HENT:      Head: Normocephalic.   Neck:      Vascular: No carotid " bruit.   Cardiovascular:      Rate and Rhythm: Normal rate and regular rhythm.      Chest Wall: PMI is not displaced.      Pulses: Normal pulses.           Radial pulses are 2+ on the right side and 2+ on the left side.        Posterior tibial pulses are 2+ on the right side and 2+ on the left side.      Heart sounds: No murmur heard.     No friction rub. No gallop.   Pulmonary:      Effort: Pulmonary effort is normal.      Breath sounds: Normal breath sounds.   Abdominal:      General: Bowel sounds are normal. There is no distension.      Palpations: Abdomen is soft.   Musculoskeletal:      Right lower leg: No edema.      Left lower leg: No edema.   Skin:     General: Skin is warm and dry.      Capillary Refill: Capillary refill takes less than 2 seconds.   Neurological:      Mental Status: She is alert and oriented to person, place, and time.   Psychiatric:         Mood and Affect: Mood normal.         Behavior: Behavior normal.         Thought Content: Thought content normal.        Lab Review:   Lipid Panel          2/14/2024    00:00   Lipid Panel   Total Cholesterol 183    Triglycerides 178    HDL Cholesterol 46    VLDL Cholesterol 31    LDL Cholesterol  106          Cardiac Procedures:       Assessment:         There are no diagnoses linked to this encounter.          Plan:       Pre-procedural risk assessment: she is at acceptable risk to undergo cervical spine surgery.  NICM: recent NSTEMI, cardiac cath showed normal coronaries, left ventricular wall motion abnormalities consistent with Takotsubo cardiomyopathy. EF 25-30%. She has been started on GDMT with Toprol XL. Repeat echo showed normalization of her LV function. Continue with current medical therapy.  HTN: blood pressure low normal. Continue with Toprol XL  HLD  Diabetes  Hypothyroidism    Thank you for allowing me to participate in this patient's care. Please call with any questions or concerns. Mrs Quesada will follow up with Dr. Phillips in 6 months.           Your medication list            Accurate as of October 18, 2024  3:14 PM. If you have any questions, ask your nurse or doctor.                CONTINUE taking these medications        Instructions Last Dose Given Next Dose Due   albuterol sulfate  (90 Base) MCG/ACT inhaler  Commonly known as: PROVENTIL HFA;VENTOLIN HFA;PROAIR HFA      Inhale 2 puffs Every 4 (Four) Hours As Needed.       bimatoprost 0.01 % ophthalmic drops  Commonly known as: LUMIGAN      1 drop Every Night. Instill 1 in affected eye once a day (at bedtime)       buPROPion  MG 24 hr tablet  Commonly known as: WELLBUTRIN XL      Take 1 tablet by mouth Every Morning. For mood       esomeprazole 40 MG capsule  Commonly known as: nexIUM      Take 1 capsule by mouth Every Morning Before Breakfast.       First-Testosterone 2 % ointment      Place  on the skin as directed by provider.       FLUoxetine 40 MG capsule  Commonly known as: PROzac      Take 2 capsules by mouth Daily. For depression and anxiety       Insulin Pen Needle 32G X 4 MM misc      For once daily use with daily injection       LORazepam 0.5 MG tablet  Commonly known as: ATIVAN           metaxalone 800 MG tablet  Commonly known as: SKELAXIN      Take 1 tablet by mouth 2 (Two) Times a Day As Needed for Muscle Spasms.       metFORMIN  MG 24 hr tablet  Commonly known as: GLUCOPHAGE-XR      TAKE 1 TABLET DAILY for impaired fasting glucose       metoprolol succinate XL 25 MG 24 hr tablet  Commonly known as: TOPROL-XL      Take 1 tablet by mouth Daily. For heart       montelukast 10 MG tablet  Commonly known as: SINGULAIR      Take 1 tablet by mouth Every Night. For allergies       mupirocin 2 % ointment  Commonly known as: Bactroban      Apply  topically to the appropriate area as directed 3 (Three) Times a Day. To wound area until healed       nitrofurantoin (macrocrystal-monohydrate) 100 MG capsule  Commonly known as: MACROBID      Take 1 capsule by mouth Daily. For  prophylaxis PRN       NP Thyroid 60 MG tablet  Generic drug: Thyroid      Take 1 tablet by mouth.       NP Thyroid 15 MG tablet  Generic drug: thyroid      take 1 tablet by mouth every day in the morning on an empty stomach       ondansetron 4 MG tablet  Commonly known as: Zofran      1-2 tabs PO Q 8 hours PRN nausea       Rocklatan 0.02-0.005 % solution  Generic drug: Netarsudil-Latanoprost           terconazole 0.8 % vaginal cream  Commonly known as: TERAZOL 3      Insert 1 applicator into the vagina Every Night. X 3 days for yeast       timolol 0.5 % ophthalmic solution  Commonly known as: TIMOPTIC           valACYclovir 1000 MG tablet  Commonly known as: VALTREX      TAKE 2 TABLETS BY MOUTH AT ONSET FOR FEVER BLISTER. REPEAT THIS DOSE 1 TIME IN 12 HOURS       Vitamin D3 50 MCG (2000 UT) tablet      Take by mouth. Take 1 capsule by oral route daily for 90 days       Wegovy 1 MG/0.5ML solution auto-injector  Generic drug: Semaglutide-Weight Management      Inject 0.5 mL under the skin into the appropriate area as directed 1 (One) Time Per Week. 1mg SQ once weekly for obesity       Xiidra 5 % ophthalmic solution  Generic drug: Lifitegrast                      Carmen Shipley, APRN  10/18/24  12:39 PM EDT

## 2024-10-21 ENCOUNTER — TREATMENT (OUTPATIENT)
Dept: CARDIAC REHAB | Facility: HOSPITAL | Age: 64
End: 2024-10-21
Payer: COMMERCIAL

## 2024-10-21 DIAGNOSIS — I21.4 NON-STEMI (NON-ST ELEVATED MYOCARDIAL INFARCTION): Primary | ICD-10-CM

## 2024-10-21 PROCEDURE — 93798 PHYS/QHP OP CAR RHAB W/ECG: CPT

## 2024-10-23 ENCOUNTER — TREATMENT (OUTPATIENT)
Dept: CARDIAC REHAB | Facility: HOSPITAL | Age: 64
End: 2024-10-23
Payer: COMMERCIAL

## 2024-10-23 DIAGNOSIS — I51.81 TAKOTSUBO CARDIOMYOPATHY: Primary | ICD-10-CM

## 2024-10-23 PROCEDURE — 93798 PHYS/QHP OP CAR RHAB W/ECG: CPT

## 2024-10-25 ENCOUNTER — APPOINTMENT (OUTPATIENT)
Dept: CARDIAC REHAB | Facility: HOSPITAL | Age: 64
End: 2024-10-25
Payer: COMMERCIAL

## 2024-10-28 ENCOUNTER — TREATMENT (OUTPATIENT)
Dept: CARDIAC REHAB | Facility: HOSPITAL | Age: 64
End: 2024-10-28
Payer: COMMERCIAL

## 2024-10-28 DIAGNOSIS — I21.4 NON-STEMI (NON-ST ELEVATED MYOCARDIAL INFARCTION): Primary | ICD-10-CM

## 2024-10-28 PROCEDURE — 93798 PHYS/QHP OP CAR RHAB W/ECG: CPT

## 2024-10-30 ENCOUNTER — APPOINTMENT (OUTPATIENT)
Dept: CARDIAC REHAB | Facility: HOSPITAL | Age: 64
End: 2024-10-30
Payer: COMMERCIAL

## 2024-11-19 NOTE — Clinical Note
catheter removed  over the wire. Patient  at 37+2 presented for external cephalic version for breech presentation. Successful ECV performed without complications. Patient and baby stable at discharge.

## 2024-12-05 ENCOUNTER — TELEPHONE (OUTPATIENT)
Dept: CARDIOLOGY | Facility: CLINIC | Age: 64
End: 2024-12-05
Payer: COMMERCIAL

## 2024-12-05 NOTE — TELEPHONE ENCOUNTER
Continue tylenol and ibuprofen for fever  Follow up with pcp in 2 to 3 days  Return to ED for any worsening pain symptoms or concerns.      [facility] sending Cardiac Clearance for sx scheduled next week.

## 2024-12-06 NOTE — TELEPHONE ENCOUNTER
Received VM from patient that she had to rescheduled her surgery that was scheduled for November 2023 but this was rescheduled for Wednesday next week 12/11/24, needing clearance.    Patient states that  already gave clearance for her to have her eye surgery .    Please advise this is with [facility] Can be reached at [phone].  Patient call back number [phone].

## 2025-02-13 RX ORDER — VALACYCLOVIR HYDROCHLORIDE 1 G/1
TABLET, FILM COATED ORAL
Qty: 20 TABLET | Refills: 5 | Status: SHIPPED | OUTPATIENT
Start: 2025-02-13

## 2025-02-18 ENCOUNTER — OFFICE VISIT (OUTPATIENT)
Dept: FAMILY MEDICINE CLINIC | Facility: CLINIC | Age: 65
End: 2025-02-18
Payer: COMMERCIAL

## 2025-02-18 VITALS
HEART RATE: 72 BPM | DIASTOLIC BLOOD PRESSURE: 80 MMHG | TEMPERATURE: 97.3 F | WEIGHT: 142 LBS | RESPIRATION RATE: 16 BRPM | BODY MASS INDEX: 26.13 KG/M2 | SYSTOLIC BLOOD PRESSURE: 146 MMHG | OXYGEN SATURATION: 100 % | HEIGHT: 62 IN

## 2025-02-18 DIAGNOSIS — K21.00 GASTROESOPHAGEAL REFLUX DISEASE WITH ESOPHAGITIS WITHOUT HEMORRHAGE: ICD-10-CM

## 2025-02-18 DIAGNOSIS — F41.1 GENERALIZED ANXIETY DISORDER: ICD-10-CM

## 2025-02-18 DIAGNOSIS — I51.81 TAKOTSUBO CARDIOMYOPATHY: ICD-10-CM

## 2025-02-18 DIAGNOSIS — E78.2 MIXED HYPERLIPIDEMIA: ICD-10-CM

## 2025-02-18 DIAGNOSIS — M48.02 CERVICAL SPINAL STENOSIS: ICD-10-CM

## 2025-02-18 DIAGNOSIS — E89.0 POSTSURGICAL HYPOTHYROIDISM: ICD-10-CM

## 2025-02-18 DIAGNOSIS — K21.9 GASTROESOPHAGEAL REFLUX DISEASE WITHOUT ESOPHAGITIS: ICD-10-CM

## 2025-02-18 DIAGNOSIS — F33.41 RECURRENT MAJOR DEPRESSIVE DISORDER, IN PARTIAL REMISSION: ICD-10-CM

## 2025-02-18 DIAGNOSIS — R73.01 IMPAIRED FASTING GLUCOSE: ICD-10-CM

## 2025-02-18 DIAGNOSIS — E55.9 VITAMIN D DEFICIENCY: ICD-10-CM

## 2025-02-18 DIAGNOSIS — I10 HYPERTENSION, ESSENTIAL, BENIGN: Primary | ICD-10-CM

## 2025-02-18 PROCEDURE — 99214 OFFICE O/P EST MOD 30 MIN: CPT | Performed by: PHYSICIAN ASSISTANT

## 2025-02-18 RX ORDER — SEMAGLUTIDE 1.7 MG/.75ML
1.7 INJECTION, SOLUTION SUBCUTANEOUS WEEKLY
Qty: 3 ML | Refills: 11 | Status: SHIPPED | OUTPATIENT
Start: 2025-02-18

## 2025-02-18 RX ORDER — ONDANSETRON 4 MG/1
TABLET, FILM COATED ORAL
Qty: 20 TABLET | Refills: 5 | Status: SHIPPED | OUTPATIENT
Start: 2025-02-18

## 2025-02-18 NOTE — PROGRESS NOTES
"Subjective   Arlene Quesada is a 64 y.o. female.     Hypertension  Associated symptoms include anxiety. Pertinent negatives include no blurred vision.   Hyperlipidemia  Exacerbating diseases include hypothyroidism and obesity.   Obesity  Pertinent negative symptoms include no diaphoresis.   Hypothyroidism  Patient reports no diaphoresis or trouble swallowing. Her past medical history is significant for hyperlipidemia.   Heartburn  She reports no choking.   Anxiety   Patient reports no suicidal ideas. Her past medical history is significant for depression.   DepressionPatient is not experiencing: choking sensation and suicidal ideas.  Her past medical history is significant for depression.         Since the last visit, she has overall felt well.  She has Primary Hypertension and well controlled on current medication, Impaired fasting glucose and will monitor labs to watch for DMII, GERD controlled on PPI Rx, Hyperlipidemia and working on this with diet and exercise, Hypothyroidism and remains under the care of their specialist, and Vitamin D deficiency and labs are at goal >30 ng/mL.  she has been compliant with current medications have reviewed them.  The patient denies medication side effects.  Will refill medications. /80   Pulse 72   Temp 97.3 °F (36.3 °C) (Temporal)   Resp 16   Ht 157.5 cm (62\")   Wt 64.4 kg (142 lb)   LMP  (LMP Unknown)   SpO2 100%   BMI 25.97 kg/m² .      Not much exercise  For remains on metformin also for treatment of impaired fasting glucose and is on semaglutide for this and treatment of obesity  Sees ENT ---right lobe --partial thyroidectomy with Dr. Schmid and he monitors her thyroid labs  Had eye surgery DR Swann---glaucoma---has appt today  Saw GYN well woman DR Glasgow 1-14-25  Saw Dr Rose---had GI work up  ----- Message from Silver Rose MD sent at 12/18/2023  2:34 PM EST -----  Biopsies normal, no abnormalities of the esophagus itself.  If ongoing symptoms " "consider for speech video swallow      She has GERD on Nexium--- she saw Dr. Tanner on 4/6/2023 and has esophagram scheduled--- for breakthrough GERD   Has PRN Macrobid for UTI--works  VAltrex works PRN fever blister  Results for orders placed or performed in visit on 07/30/24   TSH    Collection Time: 07/30/24  4:58 PM    Specimen: Blood   Result Value Ref Range    TSH 0.496 0.270 - 4.200 uIU/mL   T4, Free    Collection Time: 07/30/24  4:58 PM    Specimen: Blood   Result Value Ref Range    Free T4 0.89 (L) 0.92 - 1.68 ng/dL   T3    Collection Time: 07/30/24  4:58 PM    Specimen: Blood   Result Value Ref Range    T3, Total 114.0 80.0 - 200.0 ng/dl     Saw cardio for f/u 10-18-24----saw DAREK and has DR Phillips  Pre-procedural risk assessment: she is at acceptable risk to undergo cervical spine surgery.  NICM: recent NSTEMI, cardiac cath showed normal coronaries, left ventricular wall motion abnormalities consistent with Takotsubo cardiomyopathy. EF 25-30%. She has been started on GDMT with Toprol XL. Repeat echo showed normalization of her LV function. Continue with current medical therapy.  HTN: blood pressure low normal. Continue with Toprol XL  HLD  Diabetes  Hypothyroidism  She is still on Toprol  She was on Lotrel in past and with weight loss----stopped meds.  Arlene Quesada female 64 y.o., /80   Pulse 72   Temp 97.3 °F (36.3 °C) (Temporal)   Resp 16   Ht 157.5 cm (62\")   Wt 64.4 kg (142 lb)   LMP  (LMP Unknown)   SpO2 100%   BMI 25.97 kg/m²   who presents today for follow up of Depression and Anxiety.  She reports medication is working well, patient desires to continue on Rx, and needs refill. Onset of symptoms was approximately several years ago. She denies current suicidal and homicidal ideation. Risk factors are family history of anxiety and or depression and lifestyle of multiple roles.  Previous treatment includes current Rx. She complains of the following medication side effects:none. The " patient has previously been in counseling..      Also seeing DR Mcgraw--- neurosurgery for her cervical DDD  The following portions of the patient's history were reviewed and updated as appropriate: allergies, current medications, past family history, past medical history, past social history, past surgical history, and problem list.    Review of Systems   Constitutional:  Negative for diaphoresis.   HENT:  Negative for nosebleeds and trouble swallowing.    Eyes:  Negative for blurred vision and visual disturbance.   Respiratory:  Negative for choking.    Gastrointestinal:  Negative for blood in stool.   Allergic/Immunologic: Negative for immunocompromised state.   Neurological:  Negative for facial asymmetry and speech difficulty.   Psychiatric/Behavioral:  Negative for self-injury and suicidal ideas.        Objective   Physical Exam  Vitals and nursing note reviewed.   Constitutional:       General: She is not in acute distress.     Appearance: She is well-developed. She is not ill-appearing or toxic-appearing.   HENT:      Head: Normocephalic.      Right Ear: External ear normal.      Left Ear: External ear normal.      Nose: Nose normal.      Mouth/Throat:      Pharynx: Oropharynx is clear.   Eyes:      General: No scleral icterus.     Conjunctiva/sclera: Conjunctivae normal.      Pupils: Pupils are equal, round, and reactive to light.   Neck:      Thyroid: No thyromegaly.   Cardiovascular:      Rate and Rhythm: Normal rate and regular rhythm.      Heart sounds: Normal heart sounds. No murmur heard.  Pulmonary:      Effort: Pulmonary effort is normal. No respiratory distress.      Breath sounds: Normal breath sounds.   Musculoskeletal:         General: No deformity. Normal range of motion.      Cervical back: Normal range of motion and neck supple.   Skin:     General: Skin is warm and dry.      Findings: No rash.   Neurological:      General: No focal deficit present.      Mental Status: She is alert and  oriented to person, place, and time. Mental status is at baseline.   Psychiatric:         Mood and Affect: Mood normal.         Behavior: Behavior normal.         Thought Content: Thought content normal.         Judgment: Judgment normal.           Assessment & Plan   Diagnoses and all orders for this visit:    1. Hypertension, essential, benign (Primary)    2. Mixed hyperlipidemia    3. Postsurgical hypothyroidism    4. Impaired fasting glucose    5. Vitamin D deficiency    6. Generalized anxiety disorder    7. Gastroesophageal reflux disease without esophagitis    8. Takotsubo cardiomyopathy    9. Gastroesophageal reflux disease with esophagitis without hemorrhage    10. Cervical spinal stenosis    11. Recurrent major depressive disorder, in partial remission      Plan, Arlene Quesada, was seen today.  she was seen for HTN and continue medication, Imparied fasting glucose and plan follow up labs, diet, and exercise, GERD and will continue on PPI medication, Hyperlipidemia and will work on this with diet and exercise, Hypothyroidism and under the care of specialist, and Vitamin D deficiency and supplemented.  GYN has her on HRT  DR Phillips--- sees her for history of Takotsubo cardiomyopathy and remains on Toprol----yearly  Has follow-up with ophthalmology recent glaucoma surgery  Followed by Dr. Schmid for postsurgical hypothyroidism due to partial thyroidectomy right, not cancer  Weight is up and more hungry  Zofran works well for nausea PRN  Also seeing DR Mcgraw--- neurosurgery for her cervical DDD  Continue Prozac and Wellbutrin working fairly well for anxiety and depression  Ingrown toenails===see DR Wise

## 2025-05-16 ENCOUNTER — PATIENT MESSAGE (OUTPATIENT)
Dept: FAMILY MEDICINE CLINIC | Facility: CLINIC | Age: 65
End: 2025-05-16
Payer: COMMERCIAL

## 2025-05-16 RX ORDER — TRIAMCINOLONE ACETONIDE 1 MG/G
1 CREAM TOPICAL 2 TIMES DAILY
Qty: 80 G | Refills: 11 | Status: SHIPPED | OUTPATIENT
Start: 2025-05-16

## 2025-06-16 RX ORDER — SEMAGLUTIDE 1.7 MG/.75ML
1.7 INJECTION, SOLUTION SUBCUTANEOUS WEEKLY
Qty: 3 ML | Refills: 11 | Status: SHIPPED | OUTPATIENT
Start: 2025-06-16 | End: 2025-06-17

## 2025-06-17 RX ORDER — SEMAGLUTIDE 1.7 MG/.75ML
1.7 INJECTION, SOLUTION SUBCUTANEOUS WEEKLY
Qty: 3 ML | Refills: 11 | Status: SHIPPED | OUTPATIENT
Start: 2025-06-17

## 2025-06-26 ENCOUNTER — APPOINTMENT (OUTPATIENT)
Dept: GENERAL RADIOLOGY | Facility: HOSPITAL | Age: 65
End: 2025-06-26
Payer: COMMERCIAL

## 2025-06-26 ENCOUNTER — HOSPITAL ENCOUNTER (EMERGENCY)
Facility: HOSPITAL | Age: 65
Discharge: HOME | End: 2025-06-26
Attending: EMERGENCY MEDICINE
Payer: COMMERCIAL

## 2025-06-26 VITALS
OXYGEN SATURATION: 98 % | RESPIRATION RATE: 17 BRPM | DIASTOLIC BLOOD PRESSURE: 68 MMHG | SYSTOLIC BLOOD PRESSURE: 123 MMHG | HEART RATE: 77 BPM | TEMPERATURE: 97.8 F

## 2025-06-26 DIAGNOSIS — L03.113 CELLULITIS OF RIGHT WRIST: Primary | ICD-10-CM

## 2025-06-26 LAB
ALBUMIN SERPL-MCNC: 4 G/DL (ref 3.5–5.2)
ALBUMIN/GLOB SERPL: 1.3 G/DL
ALP SERPL-CCNC: 107 U/L (ref 39–117)
ALT SERPL W P-5'-P-CCNC: 21 U/L (ref 1–33)
ANION GAP SERPL CALCULATED.3IONS-SCNC: 13.9 MMOL/L (ref 5–15)
AST SERPL-CCNC: 17 U/L (ref 1–32)
BASOPHILS # BLD AUTO: 0.03 10*3/MM3 (ref 0–0.2)
BASOPHILS NFR BLD AUTO: 0.3 % (ref 0–1.5)
BILIRUB SERPL-MCNC: <0.2 MG/DL (ref 0–1.2)
BUN SERPL-MCNC: 22.9 MG/DL (ref 8–23)
BUN/CREAT SERPL: 28.3 (ref 7–25)
CALCIUM SPEC-SCNC: 9.7 MG/DL (ref 8.6–10.5)
CHLORIDE SERPL-SCNC: 101 MMOL/L (ref 98–107)
CO2 SERPL-SCNC: 23.1 MMOL/L (ref 22–29)
CREAT SERPL-MCNC: 0.81 MG/DL (ref 0.57–1)
D-LACTATE SERPL-SCNC: 1 MMOL/L (ref 0.5–2)
DEPRECATED RDW RBC AUTO: 45 FL (ref 37–54)
EGFRCR SERPLBLD CKD-EPI 2021: 81.2 ML/MIN/1.73
EOSINOPHIL # BLD AUTO: 0.32 10*3/MM3 (ref 0–0.4)
EOSINOPHIL NFR BLD AUTO: 2.9 % (ref 0.3–6.2)
ERYTHROCYTE [DISTWIDTH] IN BLOOD BY AUTOMATED COUNT: 13 % (ref 12.3–15.4)
GLOBULIN UR ELPH-MCNC: 3.1 GM/DL
GLUCOSE SERPL-MCNC: 94 MG/DL (ref 65–99)
HCT VFR BLD AUTO: 36.2 % (ref 34–46.6)
HGB BLD-MCNC: 11.5 G/DL (ref 12–15.9)
IMM GRANULOCYTES # BLD AUTO: 0.02 10*3/MM3 (ref 0–0.05)
IMM GRANULOCYTES NFR BLD AUTO: 0.2 % (ref 0–0.5)
LYMPHOCYTES # BLD AUTO: 2.9 10*3/MM3 (ref 0.7–3.1)
LYMPHOCYTES NFR BLD AUTO: 26 % (ref 19.6–45.3)
MCH RBC QN AUTO: 29.2 PG (ref 26.6–33)
MCHC RBC AUTO-ENTMCNC: 31.8 G/DL (ref 31.5–35.7)
MCV RBC AUTO: 91.9 FL (ref 79–97)
MONOCYTES # BLD AUTO: 0.94 10*3/MM3 (ref 0.1–0.9)
MONOCYTES NFR BLD AUTO: 8.4 % (ref 5–12)
NEUTROPHILS NFR BLD AUTO: 6.95 10*3/MM3 (ref 1.7–7)
NEUTROPHILS NFR BLD AUTO: 62.2 % (ref 42.7–76)
PLATELET # BLD AUTO: 455 10*3/MM3 (ref 140–450)
PMV BLD AUTO: 9.5 FL (ref 6–12)
POTASSIUM SERPL-SCNC: 3.9 MMOL/L (ref 3.5–5.2)
PROT SERPL-MCNC: 7.1 G/DL (ref 6–8.5)
RBC # BLD AUTO: 3.94 10*6/MM3 (ref 3.77–5.28)
SODIUM SERPL-SCNC: 138 MMOL/L (ref 136–145)
WBC NRBC COR # BLD AUTO: 11.16 10*3/MM3 (ref 3.4–10.8)

## 2025-06-26 PROCEDURE — 73110 X-RAY EXAM OF WRIST: CPT

## 2025-06-26 PROCEDURE — 25810000003 SODIUM CHLORIDE 0.9 % SOLUTION 250 ML FLEX CONT: Performed by: EMERGENCY MEDICINE

## 2025-06-26 PROCEDURE — 96365 THER/PROPH/DIAG IV INF INIT: CPT

## 2025-06-26 PROCEDURE — 36415 COLL VENOUS BLD VENIPUNCTURE: CPT

## 2025-06-26 PROCEDURE — 83605 ASSAY OF LACTIC ACID: CPT | Performed by: EMERGENCY MEDICINE

## 2025-06-26 PROCEDURE — G0463 HOSPITAL OUTPT CLINIC VISIT: HCPCS | Performed by: EMERGENCY MEDICINE

## 2025-06-26 PROCEDURE — 99284 EMERGENCY DEPT VISIT MOD MDM: CPT | Performed by: EMERGENCY MEDICINE

## 2025-06-26 PROCEDURE — 80053 COMPREHEN METABOLIC PANEL: CPT | Performed by: EMERGENCY MEDICINE

## 2025-06-26 PROCEDURE — 25010000002 VANCOMYCIN 1 G RECONSTITUTED SOLUTION 1 EACH VIAL: Performed by: EMERGENCY MEDICINE

## 2025-06-26 PROCEDURE — 87040 BLOOD CULTURE FOR BACTERIA: CPT | Performed by: EMERGENCY MEDICINE

## 2025-06-26 PROCEDURE — 99283 EMERGENCY DEPT VISIT LOW MDM: CPT

## 2025-06-26 PROCEDURE — 85025 COMPLETE CBC W/AUTO DIFF WBC: CPT | Performed by: EMERGENCY MEDICINE

## 2025-06-26 RX ORDER — SULFAMETHOXAZOLE AND TRIMETHOPRIM 800; 160 MG/1; MG/1
1 TABLET ORAL 2 TIMES DAILY
Qty: 20 TABLET | Refills: 0 | Status: SHIPPED | OUTPATIENT
Start: 2025-06-26

## 2025-06-26 RX ORDER — HYDROCODONE BITARTRATE AND ACETAMINOPHEN 5; 325 MG/1; MG/1
1 TABLET ORAL EVERY 6 HOURS PRN
Qty: 10 TABLET | Refills: 0 | Status: CANCELLED | OUTPATIENT
Start: 2025-06-26

## 2025-06-26 RX ORDER — SULFAMETHOXAZOLE AND TRIMETHOPRIM 800; 160 MG/1; MG/1
1 TABLET ORAL 2 TIMES DAILY
Qty: 20 TABLET | Refills: 0 | Status: CANCELLED | OUTPATIENT
Start: 2025-06-26

## 2025-06-26 RX ORDER — SODIUM CHLORIDE 0.9 % (FLUSH) 0.9 %
10 SYRINGE (ML) INJECTION AS NEEDED
Status: DISCONTINUED | OUTPATIENT
Start: 2025-06-26 | End: 2025-06-26 | Stop reason: HOSPADM

## 2025-06-26 RX ORDER — HYDROCODONE BITARTRATE AND ACETAMINOPHEN 5; 325 MG/1; MG/1
1 TABLET ORAL EVERY 6 HOURS PRN
Qty: 10 TABLET | Refills: 0 | Status: SHIPPED | OUTPATIENT
Start: 2025-06-26

## 2025-06-26 RX ADMIN — SODIUM CHLORIDE 1000 MG: 9 INJECTION, SOLUTION INTRAVENOUS at 18:10

## 2025-06-26 NOTE — FSED PROVIDER NOTE
Subjective   History of Present Illness  65-year-old female patient with chief complaint of right wrist pain.  Patient states she has history of osteoarthritis.  Patient gets steroid injections into her right wrist every few months.  Patient states last steroid injection into her right wrist was 4 months ago.  Patient states for the past 3 days she has noticed pain, redness, swelling to her right wrist.  Patient went to [facility] clinic 2 days ago regarding similar complaint.  Patient was started on Keflex.  Patient states past 2 days she has noticed increased redness, pain, swelling to her right wrist.  Denies history of diabetes.  Denies fever at home.  Denies recent trauma or injury to her right wrist.        Review of Systems   Musculoskeletal:  Positive for arthralgias.       Past Medical History:   Diagnosis Date    Anemia, iron deficiency     Arthritis     Depression     Gastroesophageal reflux     Glaucoma     History of complete eye exam 64734    [facility]: early glaucoma    History of EKG 03/2006    borderline QT prolonged, NSR    History of MRI of cervical spine 03/29/2011    Multilevel DDD with multilevel spinal steonsis, most severe at C5-6 and C6-7, less prominent at C3-4 and C4-5, there is neuroforaminimal compromise on the right at multiple levels    History of MRI of lumbar spine 03/29/2011    Multilevel DDD most prominent at L4-5 and L5-S1, moderate facet DDD, protrusion in L4-5 and mild disc bulge at L5-S1    Hyperlipemia     Hypertension, essential, benign     Low back pain     Nausea 01/23/2015    Reflux esophagitis     Thyroid disorder     Thyroid nodule        Allergies   Allergen Reactions    Carrot [Daucus Carota] Anaphylaxis    Banana Itching     Throat itching      Augmentin [Amoxicillin-Pot Clavulanate] Diarrhea and GI Intolerance       Past Surgical History:   Procedure Laterality Date    APPENDECTOMY      BREAST BIOPSY  09/26/2013    left - benign fibroadenoma    CARDIAC CATHETERIZATION  N/A 2024    Procedure: Left Heart Cath;  Surgeon: Marjorie Phillips MD;  Location:  [facility];  Service: Cardiology;  Laterality: N/A;    CARDIAC CATHETERIZATION N/A 2024    Procedure: Coronary angiography;  Surgeon: Marjorie Phillips MD;  Location:  [facility];  Service: Cardiology;  Laterality: N/A;    CARDIAC CATHETERIZATION N/A 2024    Procedure: Left ventriculography;  Surgeon: Marjorie Phillips MD;  Location:  [facility];  Service: Cardiology;  Laterality: N/A;    CHOLECYSTECTOMY      COLONOSCOPY      COLPOSCOPY      ENDOMETRIAL ABLATION      ENDOSCOPY  2006    Dr. Ruiz: clean based gastric ulcer    ENDOSCOPY N/A 2023    Procedure: ESOPHAGOGASTRODUODENOSCOPY with cold biopsies;  Surgeon: Silver Rose MD;  Location:  [facility];  Service: Gastroenterology;  Laterality: N/A;  Pre: dysphagia  Post: normal    HYSTERECTOMY  10/2010    THYROIDECTOMY, PARTIAL Right     TRABECULECTOMY Bilateral     UPPER GASTROINTESTINAL ENDOSCOPY         Family History   Problem Relation Age of Onset    Breast cancer Mother     Kidney cancer Mother     Hypertension Father     Diabetes Brother        Social History     Socioeconomic History    Marital status:    Tobacco Use    Smoking status: Former     Current packs/day: 0.00     Types: Cigarettes     Quit date:      Years since quittin.4     Passive exposure: Never    Smokeless tobacco: Never    Tobacco comments:     Started at age 19/Stopped at age 45   Vaping Use    Vaping status: Never Used   Substance and Sexual Activity    Alcohol use: Yes     Comment: rare    Drug use: No    Sexual activity: Yes     Partners: Female           Objective   Physical Exam  Vitals and nursing note reviewed.   Constitutional:       General: She is not in acute distress.     Appearance: Normal appearance. She is not toxic-appearing.   HENT:      Head: Normocephalic and atraumatic.       Right Ear: Tympanic membrane normal.      Left Ear: Tympanic membrane normal.      Nose: Nose normal.      Mouth/Throat:      Mouth: Mucous membranes are moist.   Eyes:      Extraocular Movements: Extraocular movements intact.      Conjunctiva/sclera: Conjunctivae normal.   Cardiovascular:      Rate and Rhythm: Normal rate and regular rhythm.      Heart sounds: Normal heart sounds.   Pulmonary:      Breath sounds: Normal breath sounds.   Abdominal:      Palpations: Abdomen is soft.      Tenderness: There is no abdominal tenderness. There is no guarding or rebound.   Musculoskeletal:         General: Swelling and tenderness (Near the right snuffbox there is erythema extending to the volar surface of the right wrist.  Tenderness upon palpating the right wrist located radially) present. Normal range of motion.      Cervical back: Normal range of motion. No tenderness.   Skin:     General: Skin is warm.   Neurological:      General: No focal deficit present.      Mental Status: She is alert and oriented to person, place, and time.   Psychiatric:         Mood and Affect: Mood normal.         Behavior: Behavior normal.         Judgment: Judgment normal.         Procedures           ED Course                                           Medical Decision Making  Right radial pulse palpated.  Patient received IV vancomycin.  On reevaluation at 6:50 PM patient is resting company bedside.  I did offer to admit the patient.  Patient does not want to be admitted.  Patient would like to go home.  Patient will be started on Bactrim in addition to Keflex.  Patient aware if there is increased redness, swelling, pain to her right hand she should return immediately for evaluation    Problems Addressed:  Cellulitis of right wrist: complicated acute illness or injury    Amount and/or Complexity of Data Reviewed  Labs: ordered.     Details: Labs Reviewed  COMPREHENSIVE METABOLIC PANEL - Abnormal; Notable for the following components:      BUN/Creatinine Ratio          28.3 (*)            All other components within normal limits         Narrative: GFR Categories in Chronic Kidney Disease (CKD)                                              GFR Category          GFR (mL/min/1.73)    Interpretation                  G1                    90 or greater        Normal or high (1)                  G2                    60-89                Mild decrease (1)                  G3a                   45-59                Mild to moderate decrease                  G3b                   30-44                Moderate to severe decrease                  G4                    15-29                Severe decrease                  G5                    14 or less           Kidney failure                                    (1)In the absence of evidence of kidney disease, neither GFR category G1 or G2 fulfill the criteria for CKD.                                    eGFR calculation 2021 CKD-EPI creatinine equation, which does not include race as a factor  CBC WITH AUTO DIFFERENTIAL - Abnormal; Notable for the following components:     WBC                           11.16 (*)               Hemoglobin                    11.5 (*)               Platelets                     455 (*)                Monocytes, Absolute           0.94 (*)            All other components within normal limits  LACTIC ACID, PLASMA - Normal  BLOOD CULTURE  BLOOD CULTURE  CBC AND DIFFERENTIAL    Radiology: ordered.     Details: XR Wrist 3+ View Right  Result Date: 6/26/2025  Narrative: XR WRIST 3+ VW RIGHT-  INDICATIONS: Pain  TECHNIQUE: 3 VIEWS OF THE RIGHT WRIST  COMPARISON: None available  FINDINGS:  Soft tissue swelling of the wrist is apparent. No acute fracture, erosion, or dislocation is identified. Osteoarthritic degenerative changes are seen. A corticated ossific focus adjacent to the ulnar styloid may be sequela of old injury. Follow-up/further evaluation can be obtained as indications  persist.      Impression:  As described.    This report was finalized on 6/26/2025 6:39 PM by Dr. David Walker M.D on Workstation: [id number]      OCT, Optic Nerve - OU - Both Eyes  Result Date: 6/17/2025  Narrative: Right Eye Images reviewed and comparison made to baseline. To assess optic nerve function and for use in future follow-up. Reliability: good and adequate. 71. 72. 72. 72. Left Eye Images reviewed and comparison made to baseline. To assess optic nerve function and for use in future follow-up. Reliability: good and adequate. 55,63. 63. 64. 65.         Risk  Prescription drug management.        Final diagnoses:   Cellulitis of right wrist       ED Disposition  ED Disposition       ED Disposition   Discharge    Condition   Stable    Comment   --               Dasia Glez, WOO  [address] [facility] [address]  [place] [id number] [phone]    Call in 1 day  Follow-up with your primary care doctor as soon as possible.  Return for any worsening conditions.         Medication List        New Prescriptions      HYDROcodone-acetaminophen 5-325 MG per tablet  Commonly known as: NORCO  Take 1 tablet by mouth Every 6 (Six) Hours As Needed for Severe Pain.     sulfamethoxazole-trimethoprim 800-160 MG per tablet  Commonly known as: BACTRIM DS,SEPTRA DS  Take 1 tablet by mouth 2 (Two) Times a Day.               Where to Get Your Medications        These medications were sent to [id number] - [place], [place] - [address] AT [facility] [place] - [phone]  - [phone] fx [id number], [facility] [address]      Phone: [phone]   HYDROcodone-acetaminophen 5-325 MG per tablet  sulfamethoxazole-trimethoprim 800-160 MG per tablet

## 2025-07-01 LAB
BACTERIA SPEC AEROBE CULT: NORMAL
BACTERIA SPEC AEROBE CULT: NORMAL

## 2025-08-25 RX ORDER — METFORMIN HYDROCHLORIDE 500 MG/1
TABLET, EXTENDED RELEASE ORAL
Qty: 90 TABLET | Refills: 0 | Status: SHIPPED | OUTPATIENT
Start: 2025-08-25

## (undated) DEVICE — CANN O2 ETCO2 FITS ALL CONN CO2 SMPL A/ 7IN DISP LF

## (undated) DEVICE — GLIDESHEATH SLENDER STAINLESS STEEL KIT: Brand: GLIDESHEATH SLENDER

## (undated) DEVICE — CATH DIAG IMPULSE FR4 5F 100CM

## (undated) DEVICE — CATH DIAG IMPULSE FL3.5 5F 100CM

## (undated) DEVICE — KT ORCA ORCAPOD DISP STRL

## (undated) DEVICE — CATH DIAG EXPO .045 FL3  5F 100CM

## (undated) DEVICE — CATH DIAG RADL PERFORMA ULTIMATE ULT1 5F .038IN 100CM

## (undated) DEVICE — TUBING, SUCTION, 1/4" X 10', STRAIGHT: Brand: MEDLINE

## (undated) DEVICE — BLCK/BITE BLOX W/DENTL/RIM W/STRAP 54F

## (undated) DEVICE — Device

## (undated) DEVICE — PINNACLE INTRODUCER SHEATH: Brand: PINNACLE

## (undated) DEVICE — TR BAND RADIAL ARTERY COMPRESSION DEVICE: Brand: TR BAND

## (undated) DEVICE — DGW .035 FC J3MM 260CM TEF: Brand: EMERALD

## (undated) DEVICE — ADAPT CLN BIOGUARD AIR/H2O DISP

## (undated) DEVICE — SENSR O2 OXIMAX FNGR A/ 18IN NONSTR

## (undated) DEVICE — PK CATH CARD 40

## (undated) DEVICE — KT MANIFLD CARDIAC

## (undated) DEVICE — FRCP BX RADJAW4 NDL 2.8 240CM LG OG BX40

## (undated) DEVICE — CATH VENT MIV RADL PIG ST TIP 5F 110CM

## (undated) DEVICE — CATH DIAG CARD PERFORMA JL4.0 BT 4F100CM

## (undated) DEVICE — LN SMPL CO2 SHTRM SD STREAM W/M LUER

## (undated) DEVICE — CATH4F INF PIG 145Â° MOD 110CM: Brand: INFINITI

## (undated) DEVICE — RADIFOCUS OPTITORQUE ANGIOGRAPHIC CATHETER: Brand: OPTITORQUE

## (undated) DEVICE — CATH DIAG IMPULSE AL1 5F 100CM